# Patient Record
Sex: MALE | Race: WHITE | NOT HISPANIC OR LATINO
[De-identification: names, ages, dates, MRNs, and addresses within clinical notes are randomized per-mention and may not be internally consistent; named-entity substitution may affect disease eponyms.]

---

## 2017-04-18 ENCOUNTER — APPOINTMENT (OUTPATIENT)
Dept: VASCULAR SURGERY | Facility: CLINIC | Age: 77
End: 2017-04-18

## 2017-04-18 VITALS — SYSTOLIC BLOOD PRESSURE: 155 MMHG | DIASTOLIC BLOOD PRESSURE: 75 MMHG | OXYGEN SATURATION: 96 % | HEART RATE: 51 BPM

## 2017-07-14 ENCOUNTER — APPOINTMENT (OUTPATIENT)
Dept: VASCULAR SURGERY | Facility: CLINIC | Age: 77
End: 2017-07-14

## 2018-07-20 ENCOUNTER — OUTPATIENT (OUTPATIENT)
Dept: OUTPATIENT SERVICES | Facility: HOSPITAL | Age: 78
LOS: 1 days | End: 2018-07-20
Payer: MEDICARE

## 2018-07-20 DIAGNOSIS — Z86.79 PERSONAL HISTORY OF OTHER DISEASES OF THE CIRCULATORY SYSTEM: Chronic | ICD-10-CM

## 2018-07-20 DIAGNOSIS — Z98.89 OTHER SPECIFIED POSTPROCEDURAL STATES: Chronic | ICD-10-CM

## 2018-07-20 DIAGNOSIS — Z90.89 ACQUIRED ABSENCE OF OTHER ORGANS: Chronic | ICD-10-CM

## 2018-07-20 PROCEDURE — 73630 X-RAY EXAM OF FOOT: CPT | Mod: 26,RT

## 2018-07-20 PROCEDURE — 73630 X-RAY EXAM OF FOOT: CPT

## 2024-04-22 ENCOUNTER — TRANSCRIPTION ENCOUNTER (OUTPATIENT)
Age: 84
End: 2024-04-22

## 2024-04-26 ENCOUNTER — RESULT REVIEW (OUTPATIENT)
Age: 84
End: 2024-04-26

## 2024-04-26 ENCOUNTER — OUTPATIENT (OUTPATIENT)
Dept: OUTPATIENT SERVICES | Facility: HOSPITAL | Age: 84
LOS: 1 days | End: 2024-04-26
Payer: MEDICARE

## 2024-04-26 ENCOUNTER — APPOINTMENT (OUTPATIENT)
Dept: ORTHOPEDIC SURGERY | Facility: CLINIC | Age: 84
End: 2024-04-26
Payer: MEDICARE

## 2024-04-26 VITALS
SYSTOLIC BLOOD PRESSURE: 142 MMHG | OXYGEN SATURATION: 100 % | HEIGHT: 68 IN | BODY MASS INDEX: 21.22 KG/M2 | DIASTOLIC BLOOD PRESSURE: 83 MMHG | HEART RATE: 60 BPM | WEIGHT: 140 LBS

## 2024-04-26 DIAGNOSIS — Z87.39 PERSONAL HISTORY OF OTHER DISEASES OF THE MUSCULOSKELETAL SYSTEM AND CONNECTIVE TISSUE: ICD-10-CM

## 2024-04-26 DIAGNOSIS — Z98.89 OTHER SPECIFIED POSTPROCEDURAL STATES: Chronic | ICD-10-CM

## 2024-04-26 DIAGNOSIS — Z85.46 PERSONAL HISTORY OF MALIGNANT NEOPLASM OF PROSTATE: ICD-10-CM

## 2024-04-26 DIAGNOSIS — Z86.39 PERSONAL HISTORY OF OTHER ENDOCRINE, NUTRITIONAL AND METABOLIC DISEASE: ICD-10-CM

## 2024-04-26 DIAGNOSIS — Z90.89 ACQUIRED ABSENCE OF OTHER ORGANS: Chronic | ICD-10-CM

## 2024-04-26 DIAGNOSIS — M16.0 BILATERAL PRIMARY OSTEOARTHRITIS OF HIP: ICD-10-CM

## 2024-04-26 DIAGNOSIS — Z86.79 PERSONAL HISTORY OF OTHER DISEASES OF THE CIRCULATORY SYSTEM: ICD-10-CM

## 2024-04-26 DIAGNOSIS — Z60.2 PROBLEMS RELATED TO LIVING ALONE: ICD-10-CM

## 2024-04-26 DIAGNOSIS — Z56.0 UNEMPLOYMENT, UNSPECIFIED: ICD-10-CM

## 2024-04-26 DIAGNOSIS — Z78.9 OTHER SPECIFIED HEALTH STATUS: ICD-10-CM

## 2024-04-26 DIAGNOSIS — Z86.79 PERSONAL HISTORY OF OTHER DISEASES OF THE CIRCULATORY SYSTEM: Chronic | ICD-10-CM

## 2024-04-26 PROCEDURE — 73502 X-RAY EXAM HIP UNI 2-3 VIEWS: CPT

## 2024-04-26 PROCEDURE — 99204 OFFICE O/P NEW MOD 45 MIN: CPT

## 2024-04-26 PROCEDURE — 73502 X-RAY EXAM HIP UNI 2-3 VIEWS: CPT | Mod: 26,LT

## 2024-04-26 RX ORDER — DABIGATRAN ETEXILATE 150 MG/1
CAPSULE ORAL
Refills: 0 | Status: ACTIVE | COMMUNITY

## 2024-04-26 RX ORDER — MELOXICAM 15 MG/1
TABLET ORAL
Refills: 0 | Status: ACTIVE | COMMUNITY

## 2024-04-26 RX ORDER — METFORMIN HYDROCHLORIDE 625 MG/1
TABLET ORAL
Refills: 0 | Status: ACTIVE | COMMUNITY

## 2024-04-26 RX ORDER — RELUGOLIX 120 MG/1
TABLET, FILM COATED ORAL
Refills: 0 | Status: ACTIVE | COMMUNITY

## 2024-04-26 RX ORDER — AMLODIPINE BESYLATE 5 MG/1
TABLET ORAL
Refills: 0 | Status: ACTIVE | COMMUNITY

## 2024-04-26 RX ORDER — ASPIRIN 81 MG
81 TABLET,CHEWABLE ORAL
Refills: 0 | Status: ACTIVE | COMMUNITY

## 2024-04-26 SDOH — SOCIAL STABILITY - SOCIAL INSECURITY: PROBLEMS RELATED TO LIVING ALONE: Z60.2

## 2024-04-26 SDOH — ECONOMIC STABILITY - INCOME SECURITY: UNEMPLOYMENT, UNSPECIFIED: Z56.0

## 2024-04-26 NOTE — REVIEW OF SYSTEMS
[Arthralgia] : arthralgia [Joint Pain] : joint pain [Joint Stiffness] : joint stiffness [Fever] : no fever [Chills] : no chills

## 2024-04-26 NOTE — HISTORY OF PRESENT ILLNESS
[de-identified] : 83-year-old male for evaluation of left hip pain. Patient endorses a history of arthritis, and has been having pain in the left hip for over 15 years, denies any injury. Patient mainly endorses lateral hip pain, with occasional buttock pain. He feels the pain has progressed over time. He has pain with walking and standing and transitions, occasional night pain, but is able to sleep in the left side. He also endorses some clicking. He is unable to do shoes and socks without assistance. for the last few years, patient has been in a wheelchair when going outside of the home, but also has a walker, and uses a four-legged cane and his other arm on furniture for support around the home. He has had multiple hip injections in the past, which have become less effective overtime, the last was one year ago.  He also feels that his inability to ambulate have significantly decreased in the last year. He also describes history of significant arthritis in the left knee, and difficulty straightening this knee. Past medical history of a CVA in 2014 with some visual deficits, diabetes, hypertension, hyperlipidemia, prostate cancer, spinal stenosis. Past surgical history of hernia repair. He sees a PCP, cardiologist, urologist, and denies any drug allergies. He does live alone in the family home, has neighbors for support.

## 2024-04-26 NOTE — PHYSICAL EXAM
[Antalgic] : antalgic [Stooped] : stooped [Cane] : ambulates with cane [Wheelchair] : uses a wheelchair [Normal] : Alert and in no acute distress [de-identified] : Patient is alert and oriented x 3.  He had a great deal of difficulty even taking a few steps in the office on gait exam but was able to rise up with the help of arm support from the seated position to stand.    Left hip: flexion to 100, internal rotation 20, external rotation 30, tender palpation over the greater trochanter, painful at extremes of motion, which was reproduced to lateral hip pain, Abduction strength 4/5 Left knee: 10 valgus,  range of motion, Crepitus throughout arc of motion, Anterior and lateral joint line pain [de-identified] : Radiographs today of the left hip to include 4 views. There is severe arthritic changes noted with Bonan bone sclerosis, joint space narrowing, osteophytic changes, and sub. The right hip is also demonstrating significant arthritis, on the left side there is protrusion noted. There is also, significant arthritic changes and sclerosis of the lumbar spine noted.

## 2024-04-26 NOTE — DISCUSSION/SUMMARY
[de-identified] : I was present with the Fellow during the key portions of the history and exam. I discussed the case with the Fellow and agree with the findings and plan as documented in the [facility] note, unless otherwise noted below.    83-year-old male with left hip pain, secondary to trochanteric bursitis and osteoarthritis. Difficulty to ambulate likely secondary to spinal stenosis, possible contribution of hip arthritis, trochanteric bursitis, and knee arthritis with knee flexion contracture.  Given the clinical symptoms, physical exam and imaging findings, we discussed the possibility of hip replacement surgery.  We reviewed the elective quality of life nature of the procedure.  We also discussed the risks, benefits and expected and potential outcomes at length.  The details of those risks are below.  Although I think the hips are certainly contributing to his pain his overall ambulation is affected by many issues.  Is not clear to me that the risk-benefit ratio would favor arthroplasty at least at this time.  We had a long discussion with the patient that he has many causes which may be leading to his ambulating, which seems to have been progressing over a long period of time. The patient has not had evaluation on his spine, and we recommended someone for referral for him to get checked out. We also discussed the nature of this elective procedure and that it does carry risk. In the interim, we will start with physical therapy to help with gait training, ambulation, and balance. The patient said that this would be a great place to start as he is trying to be able to walk short distances across the room. All questions or answered to his satisfaction.

## 2024-05-03 ENCOUNTER — APPOINTMENT (OUTPATIENT)
Dept: ORTHOPEDIC SURGERY | Facility: CLINIC | Age: 84
End: 2024-05-03
Payer: MEDICARE

## 2024-05-03 VITALS
SYSTOLIC BLOOD PRESSURE: 173 MMHG | HEART RATE: 68 BPM | HEIGHT: 68 IN | TEMPERATURE: 97 F | BODY MASS INDEX: 21.22 KG/M2 | WEIGHT: 140 LBS | DIASTOLIC BLOOD PRESSURE: 85 MMHG

## 2024-05-03 DIAGNOSIS — M16.10 UNILATERAL PRIMARY OSTEOARTHRITIS, UNSPECIFIED HIP: ICD-10-CM

## 2024-05-03 DIAGNOSIS — M47.816 SPONDYLOSIS W/OUT MYELOPATHY OR RADICULOPATHY, LUMBAR REGION: ICD-10-CM

## 2024-05-03 DIAGNOSIS — M41.9 SCOLIOSIS, UNSPECIFIED: ICD-10-CM

## 2024-05-03 PROCEDURE — 72110 X-RAY EXAM L-2 SPINE 4/>VWS: CPT

## 2024-05-03 PROCEDURE — 99215 OFFICE O/P EST HI 40 MIN: CPT

## 2024-05-03 PROCEDURE — 99205 OFFICE O/P NEW HI 60 MIN: CPT

## 2024-05-03 NOTE — PHYSICAL EXAM
[UE/LE] : Sensory: Intact in bilateral upper & lower extremities [Normal DTR Reflexes] : DTR reflexes normal [Normal Proprioception] : sensation intact for proprioception [Normal] : No costovertebral angle tenderness and no spinal tenderness [de-identified] : Range of motion left hip aggravates posterior pelvic pain Tenderness left greater trochanter [de-identified] : 4 view lumbar spine x-ray Ortho PACS 5/3/2024: End-stage osteoarthritis bilateral hips.  Degenerative lumbar scoliosis with coronal shift of the thoracolumbar junction to the right.  Asymmetric disc base collapse at L3-4 and L4-5.  No evidence of instability.

## 2024-05-03 NOTE — DISCUSSION/SUMMARY
[de-identified] : Leonardo discussion with the patient regarding my impression of his symptoms and the available imaging.  I do feel that the character of his pain exam today is consistent with a symptomatic left osteoarthritic hip however the posterior nature of the pain is somewhat atypical.  Given the lumbar degenerative change and scoliosis as well as history of injections I recommended an MRI of the lumbar spine to evaluate for any obvious stenosis or significant degenerative change that may be contributing to his pain in this area.  I also feel that he likely has some symptomatic greater trochanteric bursitis and we discussed the potential utility of an injection at this area for symptomatic relief.  In the interim I have encouraged him to continue with the exercise protocol.  Will review the imaging as soon as it is available  I have spent greater than 60 minutes preparing to see the patient, collecting relevant history, performing a thorough history and physical examination, counseling the patient regarding my findings ordering the appropriate therapies and tests, communicating with other relevant healthcare professionals, documenting my encounter and coordinating care.

## 2024-05-03 NOTE — HISTORY OF PRESENT ILLNESS
[de-identified] : 83-year-old male presents as new patient for evaluation of left posterior hip pain.  He states is been present for approximately 10 to 15 years and has gradually limited his ability to ambulate.  The pain is described as sharp and aching radiates from the posterior buttock into the left greater trochanter occasionally to the groin.  Feels the need to lean to the right side and use a cane for ambulation typically's wheelchair when leaving the house.  Denies any radiating pain numbness tingling or weakness fatigue distally in the legs when walking.  This pain is mechanical and reliably improves when sitting down or lying.  He describes a remote history of injections into the back which were helpful however most recently were not several years ago.  He has been evaluated by Dr. Echeverria for the possibility of a left total hip replacement in the setting of bilateral severe osteoarthritis.  Several days ago he initiated an exercise program which she feels has been helping somewhat already

## 2024-05-03 NOTE — ASSESSMENT
[FreeTextEntry1] : 83-year-old male with chronic left-sided posterior pelvic and buttock pain likely secondary to osteoarthritis versus lumbar radiculopathy or facet mediated referred symptoms, with likely component of greater trochanteric bursitis

## 2024-05-07 ENCOUNTER — APPOINTMENT (OUTPATIENT)
Dept: MRI IMAGING | Facility: CLINIC | Age: 84
End: 2024-05-07
Payer: MEDICARE

## 2024-05-07 PROCEDURE — 72148 MRI LUMBAR SPINE W/O DYE: CPT

## 2024-07-12 ENCOUNTER — APPOINTMENT (OUTPATIENT)
Dept: CARDIOLOGY | Facility: CLINIC | Age: 84
End: 2024-07-12
Payer: MEDICARE

## 2024-07-12 ENCOUNTER — NON-APPOINTMENT (OUTPATIENT)
Age: 84
End: 2024-07-12

## 2024-07-12 VITALS — SYSTOLIC BLOOD PRESSURE: 115 MMHG | RESPIRATION RATE: 17 BRPM | DIASTOLIC BLOOD PRESSURE: 79 MMHG | HEIGHT: 68 IN

## 2024-07-12 DIAGNOSIS — I10 ESSENTIAL (PRIMARY) HYPERTENSION: ICD-10-CM

## 2024-07-12 DIAGNOSIS — I48.91 UNSPECIFIED ATRIAL FIBRILLATION: ICD-10-CM

## 2024-07-12 DIAGNOSIS — E78.5 HYPERLIPIDEMIA, UNSPECIFIED: ICD-10-CM

## 2024-07-12 DIAGNOSIS — Z86.73 PERSONAL HISTORY OF TRANSIENT ISCHEMIC ATTACK (TIA), AND CEREBRAL INFARCTION W/OUT RESIDUAL DEFICITS: ICD-10-CM

## 2024-07-12 DIAGNOSIS — E11.9 TYPE 2 DIABETES MELLITUS W/OUT COMPLICATIONS: ICD-10-CM

## 2024-07-12 DIAGNOSIS — R01.1 CARDIAC MURMUR, UNSPECIFIED: ICD-10-CM

## 2024-07-12 DIAGNOSIS — D50.9 IRON DEFICIENCY ANEMIA, UNSPECIFIED: ICD-10-CM

## 2024-07-12 DIAGNOSIS — I35.0 NONRHEUMATIC AORTIC (VALVE) STENOSIS: ICD-10-CM

## 2024-07-12 DIAGNOSIS — Z82.49 FAMILY HISTORY OF ISCHEMIC HEART DISEASE AND OTHER DISEASES OF THE CIRCULATORY SYSTEM: ICD-10-CM

## 2024-07-12 PROCEDURE — 93005 ELECTROCARDIOGRAM TRACING: CPT

## 2024-07-12 PROCEDURE — G2211 COMPLEX E/M VISIT ADD ON: CPT

## 2024-07-12 PROCEDURE — 93010 ELECTROCARDIOGRAM REPORT: CPT

## 2024-07-12 PROCEDURE — 93000 ELECTROCARDIOGRAM COMPLETE: CPT

## 2024-07-12 PROCEDURE — 99205 OFFICE O/P NEW HI 60 MIN: CPT

## 2024-07-12 RX ORDER — FUROSEMIDE 20 MG/1
20 TABLET ORAL
Refills: 0 | Status: ACTIVE | COMMUNITY

## 2024-07-12 RX ORDER — ASPIRIN 81 MG/1
81 TABLET ORAL
Refills: 0 | Status: ACTIVE | COMMUNITY

## 2024-07-12 RX ORDER — METOPROLOL SUCCINATE 25 MG/1
25 TABLET, EXTENDED RELEASE ORAL
Refills: 0 | Status: ACTIVE | COMMUNITY

## 2024-07-12 RX ORDER — LOSARTAN POTASSIUM 25 MG/1
25 TABLET, FILM COATED ORAL
Refills: 0 | Status: ACTIVE | COMMUNITY

## 2024-07-16 PROBLEM — I35.0 AORTIC STENOSIS: Status: ACTIVE | Noted: 2024-07-16

## 2024-07-16 RX ORDER — METFORMIN HYDROCHLORIDE 500 MG/1
500 TABLET, COATED ORAL TWICE DAILY
Refills: 0 | Status: ACTIVE | COMMUNITY

## 2024-07-16 RX ORDER — MONTELUKAST SODIUM 10 MG/1
10 TABLET, FILM COATED ORAL
Refills: 0 | Status: ACTIVE | COMMUNITY

## 2024-07-16 RX ORDER — CHLORHEXIDINE GLUCONATE 4 %
325 (65 FE) LIQUID (ML) TOPICAL
Refills: 0 | Status: ACTIVE | COMMUNITY

## 2024-07-16 RX ORDER — IBUPROFEN 200 MG/1
200 TABLET ORAL
Refills: 0 | Status: ACTIVE | COMMUNITY

## 2024-07-22 ENCOUNTER — APPOINTMENT (OUTPATIENT)
Dept: ORTHOPEDIC SURGERY | Facility: CLINIC | Age: 84
End: 2024-07-22

## 2024-08-09 ENCOUNTER — APPOINTMENT (OUTPATIENT)
Dept: ORTHOPEDIC SURGERY | Facility: CLINIC | Age: 84
End: 2024-08-09

## 2024-08-09 PROCEDURE — 99213 OFFICE O/P EST LOW 20 MIN: CPT

## 2024-08-09 NOTE — HISTORY OF PRESENT ILLNESS
[de-identified] : CHERI GOOD is known to me for bilateral hip osteoarthritis, L>R. Since we last saw them, he was dx with a UTI and was hospitalized for this and ultimately d/c'd to a rehab which he's been in for about a month. He did see Dr. Aldridge for his spine and had an MRI which he hasn't had a chance to review with Dr. Aldridge yet. He also saw a [facility] cardiologist while in the rehab and has been scheduled for a TTE on 9/3/24. He notes he's actually been able to put more weight on the left hip/leg after working with PT in the rehab and is able to ambulate with a walker at times. Denies any fevers and chills or other signs of infection.

## 2024-08-09 NOTE — DISCUSSION/SUMMARY
[de-identified] : I was present with the Fellow during the key portions of the history and exam. I discussed the case with the Fellow and agree with the findings and plan as documented in the [facility] note, unless otherwise noted below.  Bilateral hip arthritis more significant on the left.  He did have a spine evaluation as we suggested and had an MRI.  I asked him to review that and we will as well but I do feel that the hip is probably a significant contributor and likely the major contributor to his symptoms.  Unfortunate he is currently in rehab having had a UTI.  He is working on reconditioning and has started using a walker again.  We had a very long discussion with the patient and the care manager who came to visit with him today regarding this.  He is very anxious to have the hip done but I think it is important to have appropriate expectations.  I think the hip if successful could relieve pain but will not address all of his conditioning and ambulation issues and I think he would likely still need a walker long-term.  We discussed this at length.  In addition I do want him to be conditioned for the surgery in order to participate in needed therapy afterwards.  He also needs a cardiology workup with an echocardiogram.  I think the best plan is for him to continue forward with the cardiac evaluation and continue working at the rehab so that he can be discharged home safely to return when that is accomplished so that we can ensure he is conditioned enough to tolerate a surgery if he is medically cleared to do so.  Patient expressed understanding.  All questions answered.

## 2024-08-09 NOTE — PHYSICAL EXAM
[de-identified] : General: AxO x 3   In wheelchair today not able to ambulate without walker and assistance Neuro: 5/5 strength with foot eversion, foot inversion, dorsiflexion, plantar flexion, sensation is intact over the lower extremity in L2-S1 nerve distributions. 2+ dorsalis pedis and posterior tibial pulses.    Skin: no signs of infection  Hip: ROM: 90  [de-identified] : Previous x-rays of the hips are again reviewed.  He has severe degenerative arthritis of both hips with a significant valgus deformity and superior pelvic bone loss on the left.  There is significant degenerative disease of the spine noted as well.

## 2024-08-19 ENCOUNTER — APPOINTMENT (OUTPATIENT)
Dept: ORTHOPEDIC SURGERY | Facility: CLINIC | Age: 84
End: 2024-08-19
Payer: MEDICARE

## 2024-08-19 VITALS — WEIGHT: 140 LBS | BODY MASS INDEX: 21.22 KG/M2 | TEMPERATURE: 97.5 F | HEIGHT: 68 IN

## 2024-08-19 DIAGNOSIS — M16.10 UNILATERAL PRIMARY OSTEOARTHRITIS, UNSPECIFIED HIP: ICD-10-CM

## 2024-08-19 DIAGNOSIS — M47.816 SPONDYLOSIS W/OUT MYELOPATHY OR RADICULOPATHY, LUMBAR REGION: ICD-10-CM

## 2024-08-19 PROCEDURE — 99215 OFFICE O/P EST HI 40 MIN: CPT

## 2024-08-19 NOTE — ASSESSMENT
[FreeTextEntry1] : 83-year-old male with chronic left-sided posterior pelvic and buttock pain likely secondary to hip osteoarthritis and greater trochanteric bursitis

## 2024-08-19 NOTE — DISCUSSION/SUMMARY
[de-identified] : I reviewed the imaging with the patient today as well as my impression that the preponderance of his symptoms are likely related to the severe osteoarthritis in the left hip.  I have not presently recommended any intervention for the lumbar spine nor have I placed any restriction with regards to his activity level or positioning in the setting of a potential surgery.  He will follow-up with me on as-needed basis   I have spent greater than 45 minutes preparing to see the patient, collecting relevant history, performing a thorough history and physical examination, counseling the patient regarding my findings ordering the appropriate therapies and tests, communicating with other relevant healthcare professionals, documenting my encounter and coordinating care.

## 2024-08-19 NOTE — HISTORY OF PRESENT ILLNESS
[de-identified] : Returns today to review lumbar spine MRI.  Has followed up with Dr. Persaud for consideration of left total hip arthroplasty.  Currently at rehabilitation center with plans to return home and is coming weeks.  Denies any considerable change in symptoms since her last visit.  He is able to stand however walking is extremely difficult citing posterior pelvic and lateral hip pain and stiffness.

## 2024-08-19 NOTE — PHYSICAL EXAM
[Wheelchair] : uses a wheelchair [UE/LE] : Sensory: Intact in bilateral upper & lower extremities [Normal DTR Reflexes] : DTR reflexes normal [Normal Proprioception] : sensation intact for proprioception [Normal] : No costovertebral angle tenderness and no spinal tenderness [de-identified] : Range of motion left hip aggravates posterior pelvic pain Tenderness left greater trochanter [de-identified] : MRI lumbar spine care strength 5/7/2024 Degenerative disc disease at multiple levels most notable at L3-4 and L4-5.  Mild foraminal stenosis on the right at L4-5 and L5-S1 as well as on the left at L5-S1.  Minimal facet arthritis at multiple levels.  Overall alignment maintained

## 2024-08-27 ENCOUNTER — APPOINTMENT (OUTPATIENT)
Dept: CARDIOLOGY | Facility: CLINIC | Age: 84
End: 2024-08-27

## 2024-08-30 ENCOUNTER — APPOINTMENT (OUTPATIENT)
Dept: CARDIOLOGY | Facility: CLINIC | Age: 84
End: 2024-08-30
Payer: MEDICARE

## 2024-08-30 VITALS
DIASTOLIC BLOOD PRESSURE: 80 MMHG | BODY MASS INDEX: 21.22 KG/M2 | HEIGHT: 68 IN | OXYGEN SATURATION: 99 % | HEART RATE: 76 BPM | SYSTOLIC BLOOD PRESSURE: 143 MMHG | WEIGHT: 140 LBS

## 2024-08-30 DIAGNOSIS — I35.0 NONRHEUMATIC AORTIC (VALVE) STENOSIS: ICD-10-CM

## 2024-08-30 DIAGNOSIS — I42.9 CARDIOMYOPATHY, UNSPECIFIED: ICD-10-CM

## 2024-08-30 DIAGNOSIS — Z01.810 ENCOUNTER FOR PREPROCEDURAL CARDIOVASCULAR EXAMINATION: ICD-10-CM

## 2024-08-30 DIAGNOSIS — I48.91 UNSPECIFIED ATRIAL FIBRILLATION: ICD-10-CM

## 2024-08-30 PROCEDURE — 99214 OFFICE O/P EST MOD 30 MIN: CPT

## 2024-08-30 PROCEDURE — 93306 TTE W/DOPPLER COMPLETE: CPT | Mod: TC

## 2024-08-30 PROCEDURE — G2211 COMPLEX E/M VISIT ADD ON: CPT

## 2024-08-30 PROCEDURE — 93306 TTE W/DOPPLER COMPLETE: CPT | Mod: 26,59

## 2024-08-30 RX ORDER — DABIGATRAN ETEXILATE MESYLATE 150 MG/1
150 CAPSULE ORAL
Refills: 0 | Status: ACTIVE | COMMUNITY

## 2024-09-02 NOTE — CARDIOLOGY SUMMARY
[de-identified] : 7/16/2024 Atrial fibrillation. T wave inversions consider anterior ischemia [de-identified] : TTE (outside facility, report only) 6/24/24 CV Echo 2D Complete Result Date: 6/24/2024 Indications: Atrial Fibrillation, unspecified (I48.91). CPT Codes: 2D ECHO (TTE) - 74461 STRAIN - 85865 History: PMH: - Arthritis - Atrial fibrillation (HCC) - Diabetes mellitus (HCC) - Hyperlipidemia - Hypertension - Stroke (HCC) 3/15/14 Study data: Transthoracic echocardiogram. Complete 2D, complete spectral Doppler, and color Doppler techniques were used. Location: Bedside. Patient status: Inpatient. Study status: Routine. ---------------------------------------------------------------------------- Procedure: Transthoracic echocardiography was performed. Image quality for TTE was adequate. ---------------------------------------------------------------------------- Conclusions. Summary: 1. Left ventricle: The cavity size is normal. Diffuse hypocontractility of the left ventricle is present with minor regional variation. Akinesis of the anteroseptal myocardium Akinesis of the apicalanterior myocardium Akinesis of the midinferior and inferoseptal myocardium Hypokinesis of the basalinferior and inferoseptal myocardium The left ventricular ejection fraction is moderately-severely reduced. The LVEF was determined by visual estimate. Left ventricular ejection fraction is 30-35%. The global longitudinal strain is 8.5%. 2. Right ventricle: The cavity size is normal. Right ventricular systolic function is normal. 3. Mitral valve: The leaflets are normal thickness. There is mild (1+) mitral valve regurgitation. 4. Aortic valve: The valve is trileaflet. The leaflets are moderately calcified. There is severe aortic stenosis. There is mild to moderate (1-2+) valvular aortic regurgitation. 5. Tricuspid valve: The tricuspid leaflets are not thickened. There is mild (1+) tricuspid valve regurgitation. 6. Pericardium, extracardiac: There is no pericardial effusion. ---------------------------------------------------------------------------- Findings Left ventricle: The cavity size is normal. The left ventricular ejection fraction is moderately-severely reduced. The LVEF was determined by visual estimate. Left ventricular ejection fraction is 30-35%. Mild proximal septal thickening. Regional wall motion assessment: Diffuse hypocontractility of the left ventricle is present with minor regional variation. Left atrium: The cavity size is normal in size. Right ventricle: The cavity size is normal. Right ventricular systolic function is normal. Can not evaluate right ventricular systolic pressure and pulmonary pressure due to technically difficult/limited images. Right atrium: The cavity size is normal in size. Aortic valve: The valve is trileaflet. The leaflets are moderately calcified. There is severe aortic stenosis. There is mild to moderate (1-2+) valvular aortic regurgitation. Mitral valve: The leaflets are normal thickness. There is mild (1+) mitral valve regurgitation. Tricuspid valve: The tricuspid leaflets are not thickened. There is mild (1+) tricuspid valve regurgitation. Pulmonic valve: There is trace pulmonic regurgitation. Aorta: The aortic root size is normal. The ascending aorta size was not well visualized. Systemic veins: The inferior vena cava was not well visualized. Pericardium: There is no pericardial effusion.

## 2024-09-02 NOTE — RESULTS/DATA
[TextEntry] : 7/1/24 tchol 125 hdl 34 trig 86 ldl 75  iron 15 %sat 5  Na138 K 4.9 cr 0.79 normal lfts  a1c 6.6%  WBC 8.06 hgb 8.4 hct 28.1% plt 450 mcv 76.8  tsh 1.57

## 2024-09-02 NOTE — HISTORY OF PRESENT ILLNESS
[FreeTextEntry1] : CARDIOLOGY INITIAL VISIT  Dear  Does not have PCP currently  I had the pleasure of seeing Mr. CHERI GOOD in cardiology clinic today for cardiomegaly reportedly seen on a CXR and aortic stenosis  The patient is a poor historian.    HPI As you are aware, CHERI GOOD is a 83 year male with a history of osteoarthritis, hypertension, CVA in 2014, CAD(?) s/p PCI(?), prostate cancer(?), atrial fibrillation on chronic anticoagulation, PAD s/p intervention? and diabetes. He had a recent admission from approximately June 21 to 28 2024, where he presented to [facility] with altered mental status on 6/21, poor PO intake and foul smelling urine along with fevers with symptoms suspected to be due to a UTI. During his admission he had a TTE which reported an LVEF of 30-35% reported severe aortic stenosis. He was discharged to a skilled nursing facility.   3-4 months ago prior to this first appointment here on 7/12/24, the patient reports he was ambulating with a cane. 1-2 months later he had to move to a walker. As above, he was placed in a SNF after this admission due to worsening functional status. He reports his worsening functional status is due to hip pain and he is being considered for hip replacement. Denies chest pain. Denies LE edema. Denies palpitations. Denies lightheadedness. Denies bleeding episodes. No reports of orthopnea or PND. His primary complaint is pain around his hip

## 2024-09-02 NOTE — CARDIOLOGY SUMMARY
[de-identified] : 7/16/2024 Atrial fibrillation. T wave inversions consider anterior ischemia [de-identified] : TTE (outside facility, report only) 6/24/24 CV Echo 2D Complete Result Date: 6/24/2024 Indications: Atrial Fibrillation, unspecified (I48.91). CPT Codes: 2D ECHO (TTE) - 85451 STRAIN - 13822 History: PMH: - Arthritis - Atrial fibrillation (HCC) - Diabetes mellitus (HCC) - Hyperlipidemia - Hypertension - Stroke (HCC) 3/15/14 Study data: Transthoracic echocardiogram. Complete 2D, complete spectral Doppler, and color Doppler techniques were used. Location: Bedside. Patient status: Inpatient. Study status: Routine. ---------------------------------------------------------------------------- Procedure: Transthoracic echocardiography was performed. Image quality for TTE was adequate. ---------------------------------------------------------------------------- Conclusions. Summary: 1. Left ventricle: The cavity size is normal. Diffuse hypocontractility of the left ventricle is present with minor regional variation. Akinesis of the anteroseptal myocardium Akinesis of the apicalanterior myocardium Akinesis of the midinferior and inferoseptal myocardium Hypokinesis of the basalinferior and inferoseptal myocardium The left ventricular ejection fraction is moderately-severely reduced. The LVEF was determined by visual estimate. Left ventricular ejection fraction is 30-35%. The global longitudinal strain is 8.5%. 2. Right ventricle: The cavity size is normal. Right ventricular systolic function is normal. 3. Mitral valve: The leaflets are normal thickness. There is mild (1+) mitral valve regurgitation. 4. Aortic valve: The valve is trileaflet. The leaflets are moderately calcified. There is severe aortic stenosis. There is mild to moderate (1-2+) valvular aortic regurgitation. 5. Tricuspid valve: The tricuspid leaflets are not thickened. There is mild (1+) tricuspid valve regurgitation. 6. Pericardium, extracardiac: There is no pericardial effusion. ---------------------------------------------------------------------------- Findings Left ventricle: The cavity size is normal. The left ventricular ejection fraction is moderately-severely reduced. The LVEF was determined by visual estimate. Left ventricular ejection fraction is 30-35%. Mild proximal septal thickening. Regional wall motion assessment: Diffuse hypocontractility of the left ventricle is present with minor regional variation. Left atrium: The cavity size is normal in size. Right ventricle: The cavity size is normal. Right ventricular systolic function is normal. Can not evaluate right ventricular systolic pressure and pulmonary pressure due to technically difficult/limited images. Right atrium: The cavity size is normal in size. Aortic valve: The valve is trileaflet. The leaflets are moderately calcified. There is severe aortic stenosis. There is mild to moderate (1-2+) valvular aortic regurgitation. Mitral valve: The leaflets are normal thickness. There is mild (1+) mitral valve regurgitation. Tricuspid valve: The tricuspid leaflets are not thickened. There is mild (1+) tricuspid valve regurgitation. Pulmonic valve: There is trace pulmonic regurgitation. Aorta: The aortic root size is normal. The ascending aorta size was not well visualized. Systemic veins: The inferior vena cava was not well visualized. Pericardium: There is no pericardial effusion.

## 2024-09-02 NOTE — ASSESSMENT
[FreeTextEntry1] : ####  The patient is a poor historian. He reports previous stenting in the past, possibly coronary and reports taking an ASA at home prior to his hospitalization. Apparently, his PCP passed away and he does not have one. He has a cardiologist named Dr. De Santiago and today was able to provide his full name to us so we were able to look it up. We will request records so we can hopefully piece together his cardiovascular history. His TTE today was technically difficult but showed moderate aortic stenosis and grossly mildly reduced LV systolic dysfunction. This may not be new but the records will be required. He is here today with a friend? Per the admission notes available to me, the patient has a healthcare proxy named Kaleb Ryan ([phone])  As his functional status is poor and given the TTE findings, will obtain vasodilator nuclear stress test to aid in risk stratification for his orthopedic surgery

## 2024-09-02 NOTE — ASSESSMENT
[FreeTextEntry1] : ####  The patient is a poor historian. He reports previous stenting in the past, possibly coronary and reports taking an ASA at home prior to his hospitalization. Apparently, his PCP passed away and he does not have one. He has a cardiologist named Dr. De Santiago and today was able to provide his full name to us so we were able to look it up. We will request records so we can hopefully piece together his cardiovascular history. His TTE today was technically difficult but showed moderate aortic stenosis and grossly mildly reduced LV systolic dysfunction. This may not be new but the records will be required. He is here today with a friend? Per the admission notes available to me, the patient has a healthcare proxy named Kaleb Ryan ([phone])  As his functional status is poor and given the TTE findings, will obtain vasodilator nuclear stress test to aid in risk stratification for his orthopedic surgery

## 2024-09-02 NOTE — HISTORY OF PRESENT ILLNESS
[FreeTextEntry1] : CARDIOLOGY INITIAL VISIT  Dear  Does not have PCP currently  I had the pleasure of seeing Mr. CHERI GOOD in cardiology clinic today for cardiomegaly reportedly seen on a CXR and aortic stenosis  The patient is a poor historian.    HPI As you are aware, CHERI GOOD is a 83 year male with a history of osteoarthritis, hypertension, CVA in 2014, CAD(?) s/p PCI(?), prostate cancer(?), atrial fibrillation on chronic anticoagulation, PAD s/p intervention? and diabetes. He had a recent admission from approximately June 21 to 28 2024, where he presented to [facility] with altered mental status on 6/21, poor PO intake and foul smelling urine along with fevers with symptoms suspected to be due to a UTI. During his admission he had a TTE which reported an LVEF of 30-35% reported severe aortic stenosis. He was discharged to a skilled nursing facility.   3-4 months ago prior to this first appointment here on 7/12/24, the patient reports he was ambulating with a cane. 1-2 months later he had to move to a walker. As above, he was placed in a SNF after this admission due to worsening functional status. He reports his worsening functional status is due to hip pain and he is being considered for hip replacement. Denies chest pain. Denies LE edema. Denies palpitations. Denies lightheadedness. Denies bleeding episodes. No reports of orthopnea or PND. His primary complaint is pain around his hip

## 2024-09-02 NOTE — PHYSICAL EXAM
[TextEntry] : Gen: NAD, breathing comfortably, sitting in wheelchair HEENT: MMM, anicteric sclera Neck: JVP <10 cm. possible radiation of murmur to carotids Cardiac: RRR, 3/6 systolic murmur heard loudest at the RUSB Lungs: CTAB, adequate inspiratory effort. No wheezes or rhonchi Abd: soft, NT/ND. No abd bruit Ext: warm and well perfused. No significant LE edema

## 2024-09-02 NOTE — END OF VISIT
[Time Spent: ___ minutes] : I have spent [unfilled] minutes of time on the encounter which excludes teaching and separately reported services. [TextEntry] : I spent 25 minutes in face-to-face contact with the patient and 35 non face time minutes in support of this visit, including the following: Preparing to see the patient (review of tests/outside records) Obtaining and/or reviewing separately obtained history Counseling and educating the patient/family/caregiver Ordering medications; tests; or procedures, Documenting clinical information in the medical record/chart Independently interpreting results (not separately reported) and communicating results to the patient/family/caregiver Care coordination (not separately reported)

## 2024-09-18 ENCOUNTER — APPOINTMENT (OUTPATIENT)
Dept: OTOLARYNGOLOGY | Facility: CLINIC | Age: 84
End: 2024-09-18
Payer: MEDICARE

## 2024-09-18 VITALS — DIASTOLIC BLOOD PRESSURE: 72 MMHG | SYSTOLIC BLOOD PRESSURE: 108 MMHG | HEART RATE: 64 BPM

## 2024-09-18 DIAGNOSIS — Z56.0 UNEMPLOYMENT, UNSPECIFIED: ICD-10-CM

## 2024-09-18 DIAGNOSIS — H61.23 IMPACTED CERUMEN, BILATERAL: ICD-10-CM

## 2024-09-18 DIAGNOSIS — Z85.46 PERSONAL HISTORY OF MALIGNANT NEOPLASM OF PROSTATE: ICD-10-CM

## 2024-09-18 DIAGNOSIS — Z87.39 PERSONAL HISTORY OF OTHER DISEASES OF THE MUSCULOSKELETAL SYSTEM AND CONNECTIVE TISSUE: ICD-10-CM

## 2024-09-18 DIAGNOSIS — Z86.39 PERSONAL HISTORY OF OTHER ENDOCRINE, NUTRITIONAL AND METABOLIC DISEASE: ICD-10-CM

## 2024-09-18 DIAGNOSIS — Z60.2 PROBLEMS RELATED TO LIVING ALONE: ICD-10-CM

## 2024-09-18 DIAGNOSIS — Z78.9 OTHER SPECIFIED HEALTH STATUS: ICD-10-CM

## 2024-09-18 DIAGNOSIS — Z86.79 PERSONAL HISTORY OF OTHER DISEASES OF THE CIRCULATORY SYSTEM: ICD-10-CM

## 2024-09-18 DIAGNOSIS — H90.3 SENSORINEURAL HEARING LOSS, BILATERAL: ICD-10-CM

## 2024-09-18 DIAGNOSIS — E11.9 TYPE 2 DIABETES MELLITUS W/OUT COMPLICATIONS: ICD-10-CM

## 2024-09-18 PROCEDURE — 92567 TYMPANOMETRY: CPT

## 2024-09-18 PROCEDURE — 69210 REMOVE IMPACTED EAR WAX UNI: CPT

## 2024-09-18 PROCEDURE — 99203 OFFICE O/P NEW LOW 30 MIN: CPT | Mod: 25

## 2024-09-18 PROCEDURE — 92557 COMPREHENSIVE HEARING TEST: CPT

## 2024-09-18 SDOH — SOCIAL STABILITY - SOCIAL INSECURITY: PROBLEMS RELATED TO LIVING ALONE: Z60.2

## 2024-09-18 SDOH — ECONOMIC STABILITY - INCOME SECURITY: UNEMPLOYMENT, UNSPECIFIED: Z56.0

## 2024-09-18 NOTE — ASSESSMENT
[FreeTextEntry1] : SNHL and Cerumen: - Cerumen removed - Audiogram today - b/l SNHL - Cleared for HA b/l - HAE bilateral sensorineural hearing loss-cleared for hearing aids - f/u 6 months

## 2024-09-18 NOTE — PHYSICAL EXAM
[Midline] : trachea located in midline position [Normal] : no rashes [de-identified] : b/l dangelon

## 2024-09-18 NOTE — END OF VISIT
[Time Spent: ___ minutes] : I have spent [unfilled] minutes of time on the encounter which excludes teaching and separately reported services. [FreeTextEntry3] : I personally saw and examined CHERI GOOD in detail.I have made changes in changes in the body of the note where appropriate. I personally reviewed the HPI, PMH, FH, SH, ROS and medications/allergies. I have spoken to RUBY Gonzalez regarding the history and have personally determined the assessment and plan of care and documented this myself.. I performed all procedures and performed the physical exam. I have made changes in the body of the note where appropriate.   Attesting Faculty: See Attending Signature Below

## 2024-09-18 NOTE — HISTORY OF PRESENT ILLNESS
[de-identified] : 82 y/o M here with an aid.  Pt. c/o decreased hearing.  No pain, no tinnitus or Vertigo.  No d/c.  He notes he would like to get hearing aids.  No associated ear symptoms. No nasal congestion or throat c/o. no other modifying factors no other nasal or throat complaints

## 2024-09-18 NOTE — DATA REVIEWED
[de-identified] : Hearing Test performed to evaluate the extent of hearing loss and  to explain pt's symptoms  was personally reviewed and revealed Tymps-wnl Hearing-bilat SNHL w/fair SDS

## 2024-09-18 NOTE — PROCEDURE
[FreeTextEntry3] : Procedure - Cerumen Removal. Indication - Obstructing visualization of TM After informed verbal consent is obtained, cerumen was removed from the b/l ear canal(s) with a curette and suction.  Normal appearing canal(s) following removal.

## 2024-09-18 NOTE — REASON FOR VISIT
[Initial Evaluation] : an initial evaluation for [Formal Caregiver] : formal caregiver [FreeTextEntry2] : hearing loss

## 2024-10-03 ENCOUNTER — APPOINTMENT (OUTPATIENT)
Dept: GERIATRICS | Facility: CLINIC | Age: 84
End: 2024-10-03

## 2024-10-15 ENCOUNTER — APPOINTMENT (OUTPATIENT)
Dept: CARDIOLOGY | Facility: CLINIC | Age: 84
End: 2024-10-15
Payer: MEDICARE

## 2024-10-15 PROCEDURE — A9500: CPT

## 2024-10-15 PROCEDURE — 78452 HT MUSCLE IMAGE SPECT MULT: CPT

## 2024-10-15 PROCEDURE — 93015 CV STRESS TEST SUPVJ I&R: CPT

## 2024-10-29 ENCOUNTER — APPOINTMENT (OUTPATIENT)
Dept: CARDIOLOGY | Facility: CLINIC | Age: 84
End: 2024-10-29
Payer: MEDICARE

## 2024-10-29 ENCOUNTER — NON-APPOINTMENT (OUTPATIENT)
Age: 84
End: 2024-10-29

## 2024-10-29 VITALS
SYSTOLIC BLOOD PRESSURE: 108 MMHG | DIASTOLIC BLOOD PRESSURE: 69 MMHG | WEIGHT: 140 LBS | HEIGHT: 68 IN | HEART RATE: 61 BPM | BODY MASS INDEX: 21.22 KG/M2 | OXYGEN SATURATION: 99 %

## 2024-10-29 DIAGNOSIS — I35.0 NONRHEUMATIC AORTIC (VALVE) STENOSIS: ICD-10-CM

## 2024-10-29 DIAGNOSIS — I73.9 PERIPHERAL VASCULAR DISEASE, UNSPECIFIED: ICD-10-CM

## 2024-10-29 DIAGNOSIS — Z01.810 ENCOUNTER FOR PREPROCEDURAL CARDIOVASCULAR EXAMINATION: ICD-10-CM

## 2024-10-29 DIAGNOSIS — I42.9 CARDIOMYOPATHY, UNSPECIFIED: ICD-10-CM

## 2024-10-29 DIAGNOSIS — I70.1 ATHEROSCLEROSIS OF RENAL ARTERY: ICD-10-CM

## 2024-10-29 DIAGNOSIS — D50.9 IRON DEFICIENCY ANEMIA, UNSPECIFIED: ICD-10-CM

## 2024-10-29 DIAGNOSIS — I48.21 PERMANENT ATRIAL FIBRILLATION: ICD-10-CM

## 2024-10-29 DIAGNOSIS — I25.10 ATHEROSCLEROTIC HEART DISEASE OF NATIVE CORONARY ARTERY W/OUT ANGINA PECTORIS: ICD-10-CM

## 2024-10-29 PROCEDURE — 99215 OFFICE O/P EST HI 40 MIN: CPT

## 2024-10-29 PROCEDURE — G2211 COMPLEX E/M VISIT ADD ON: CPT

## 2024-10-29 PROCEDURE — 93000 ELECTROCARDIOGRAM COMPLETE: CPT

## 2024-10-30 PROBLEM — I42.9 CARDIOMYOPATHY: Status: ACTIVE | Noted: 2024-08-30

## 2024-10-30 PROBLEM — I48.21 PERMANENT ATRIAL FIBRILLATION: Status: ACTIVE | Noted: 2024-07-12

## 2024-10-30 PROBLEM — I25.10 CORONARY ARTERY DISEASE WITHOUT ANGINA PECTORIS: Status: ACTIVE | Noted: 2024-10-30

## 2024-10-30 PROBLEM — I35.0 MODERATE AORTIC STENOSIS: Status: ACTIVE | Noted: 2024-07-16

## 2024-10-30 PROBLEM — I70.1: Status: ACTIVE | Noted: 2024-10-30

## 2024-10-30 PROBLEM — I73.9 PAD (PERIPHERAL ARTERY DISEASE): Status: ACTIVE | Noted: 2024-10-30

## 2024-11-01 ENCOUNTER — APPOINTMENT (OUTPATIENT)
Dept: ORTHOPEDIC SURGERY | Facility: CLINIC | Age: 84
End: 2024-11-01
Payer: MEDICARE

## 2024-11-01 ENCOUNTER — RESULT REVIEW (OUTPATIENT)
Age: 84
End: 2024-11-01

## 2024-11-01 ENCOUNTER — OUTPATIENT (OUTPATIENT)
Dept: OUTPATIENT SERVICES | Facility: HOSPITAL | Age: 84
LOS: 1 days | End: 2024-11-01
Payer: MEDICARE

## 2024-11-01 VITALS
BODY MASS INDEX: 21.22 KG/M2 | OXYGEN SATURATION: 100 % | HEART RATE: 71 BPM | WEIGHT: 140 LBS | SYSTOLIC BLOOD PRESSURE: 104 MMHG | DIASTOLIC BLOOD PRESSURE: 70 MMHG | HEIGHT: 68 IN

## 2024-11-01 DIAGNOSIS — E83.10 DISORDER OF IRON METABOLISM, UNSPECIFIED: ICD-10-CM

## 2024-11-01 DIAGNOSIS — Z90.89 ACQUIRED ABSENCE OF OTHER ORGANS: Chronic | ICD-10-CM

## 2024-11-01 DIAGNOSIS — E55.9 VITAMIN D DEFICIENCY, UNSPECIFIED: ICD-10-CM

## 2024-11-01 DIAGNOSIS — Z01.818 ENCOUNTER FOR OTHER PREPROCEDURAL EXAMINATION: ICD-10-CM

## 2024-11-01 DIAGNOSIS — Z22.322 CARRIER OR SUSPECTED CARRIER OF METHICILLIN RESISTANT STAPHYLOCOCCUS AUREUS: ICD-10-CM

## 2024-11-01 DIAGNOSIS — Z98.89 OTHER SPECIFIED POSTPROCEDURAL STATES: Chronic | ICD-10-CM

## 2024-11-01 DIAGNOSIS — M16.0 BILATERAL PRIMARY OSTEOARTHRITIS OF HIP: ICD-10-CM

## 2024-11-01 DIAGNOSIS — Z86.79 PERSONAL HISTORY OF OTHER DISEASES OF THE CIRCULATORY SYSTEM: Chronic | ICD-10-CM

## 2024-11-01 LAB
MRSA SPEC QL CULT: NEGATIVE
STAPH AUREUS (SA): POSITIVE

## 2024-11-01 PROCEDURE — 73502 X-RAY EXAM HIP UNI 2-3 VIEWS: CPT | Mod: 26,LT

## 2024-11-01 PROCEDURE — 99214 OFFICE O/P EST MOD 30 MIN: CPT

## 2024-11-05 LAB
25(OH)D3 SERPL-MCNC: 45.4 NG/ML
ALBUMIN SERPL ELPH-MCNC: 3.7 G/DL
ALP BLD-CCNC: 154 U/L
ALT SERPL-CCNC: 28 U/L
ANION GAP SERPL CALC-SCNC: 13 MMOL/L
AST SERPL-CCNC: 24 U/L
BASOPHILS # BLD AUTO: 0.06 K/UL
BASOPHILS NFR BLD AUTO: 0.7 %
BILIRUB SERPL-MCNC: 0.2 MG/DL
BUN SERPL-MCNC: 27 MG/DL
CALCIUM SERPL-MCNC: 10 MG/DL
CHLORIDE SERPL-SCNC: 102 MMOL/L
CO2 SERPL-SCNC: 27 MMOL/L
CREAT SERPL-MCNC: 0.84 MG/DL
EGFR: 85 ML/MIN/1.73M2
EOSINOPHIL # BLD AUTO: 0.5 K/UL
EOSINOPHIL NFR BLD AUTO: 5.8 %
ESTIMATED AVERAGE GLUCOSE: 154 MG/DL
FRUCTOSAMINE SERPL-MCNC: 311 UMOL/L
GLUCOSE SERPL-MCNC: 78 MG/DL
HBA1C MFR BLD HPLC: 7 %
HCT VFR BLD CALC: 39.8 %
HGB BLD-MCNC: 12.3 G/DL
IMM GRANULOCYTES NFR BLD AUTO: 0.7 %
LYMPHOCYTES # BLD AUTO: 1.89 K/UL
LYMPHOCYTES NFR BLD AUTO: 22.1 %
MAN DIFF?: NORMAL
MCHC RBC-ENTMCNC: 28.9 PG
MCHC RBC-ENTMCNC: 30.9 G/DL
MCV RBC AUTO: 93.6 FL
MONOCYTES # BLD AUTO: 1 K/UL
MONOCYTES NFR BLD AUTO: 11.7 %
NEUTROPHILS # BLD AUTO: 5.06 K/UL
NEUTROPHILS NFR BLD AUTO: 59 %
PLATELET # BLD AUTO: 497 K/UL
POTASSIUM SERPL-SCNC: 5.5 MMOL/L
PREALB SERPL NEPH-MCNC: 16 MG/DL
PROT SERPL-MCNC: 6.9 G/DL
RBC # BLD: 4.25 M/UL
RBC # FLD: 15.5 %
SODIUM SERPL-SCNC: 141 MMOL/L
TRANSFERRIN SERPL-MCNC: 238 MG/DL
WBC # FLD AUTO: 8.57 K/UL

## 2024-11-07 ENCOUNTER — NON-APPOINTMENT (OUTPATIENT)
Age: 84
End: 2024-11-07

## 2024-11-20 PROCEDURE — 73502 X-RAY EXAM HIP UNI 2-3 VIEWS: CPT

## 2024-12-13 ENCOUNTER — NON-APPOINTMENT (OUTPATIENT)
Age: 84
End: 2024-12-13

## 2024-12-16 VITALS
OXYGEN SATURATION: 100 % | TEMPERATURE: 97 F | DIASTOLIC BLOOD PRESSURE: 87 MMHG | HEIGHT: 68 IN | RESPIRATION RATE: 16 BRPM | HEART RATE: 71 BPM | WEIGHT: 141.1 LBS | SYSTOLIC BLOOD PRESSURE: 135 MMHG

## 2024-12-16 RX ORDER — POVIDONE IODINE USP, 10% W/W 10 MG/ML
1 SWAB TOPICAL ONCE
Refills: 0 | Status: COMPLETED | OUTPATIENT
Start: 2024-12-17 | End: 2024-12-17

## 2024-12-16 NOTE — H&P ADULT - PROBLEM SELECTOR PLAN 1
Admit to Orthopaedic Service.  Presents today for elective left total hip replacement  Pt medically stable and cleared for procedure today by Dr. Ferraro and Dr. Urena and Dr. Novak

## 2024-12-16 NOTE — ASU PATIENT PROFILE, ADULT - FALL HARM RISK - RISK INTERVENTIONS

## 2024-12-16 NOTE — H&P ADULT - NSHPPHYSICALEXAM_GEN_ALL_CORE
MSK: Decreased left hip ROM secondary to pain     Remainder of PE per medical clearance note Gen: Alert and oriented, NAD  MSK: Decreased left hip ROM secondary to pain   No evidence of deformity or open wound  2+ DP pulses palpable bilaterally, skin wwp, cap refill brisk   SILT to bilateral distal lower extremities  EHL/FHL/TA/GS 5/5 bilaterally    Remainder of PE per medical clearance note

## 2024-12-16 NOTE — H&P ADULT - HISTORY OF PRESENT ILLNESS
84yoM with left hip pain x    CAD s/p stent, + hx of CVA on pradaxa and baby aspirin, Last dose pradaxa:   Patient HAS/HAS NOT been using opioid pain medications on a regular basis for more than 90 days prior to the date of surgery.    Presents today for elective left total hip replacement with Dr. Echeverria  84yoM with left hip pain x    CAD s/p stent, + hx of CVA, afib on pradaxa and baby aspirin, Last dose pradaxa:   Patient HAS/HAS NOT been using opioid pain medications on a regular basis for more than 90 days prior to the date of surgery.    Presents today for elective left total hip replacement with Dr. Echeverria  84yoM with left hip pain x 6 months without inciting accident or injury. Patient endorses pain with movement. Denies prior surgeries to the hip or injection. He takes Tylenol at home for pain with some relief of symptoms. Pain persists despite conservative measures.     CAD s/p stent, + hx of CVA, afib on pradaxa and baby aspirin, Last dose pradaxa:   Patient HAS/HAS NOT been using opioid pain medications on a regular basis for more than 90 days prior to the date of surgery.    Presents today for elective left total hip replacement with Dr. Echeverria  84yoM with left hip pain x 6 months without inciting accident or injury. Patient endorses pain with movement. Denies prior surgeries to the hip or injection. He takes Tylenol at home for pain with some relief of symptoms. Pain persists despite conservative measures. Ambulates via wheelchair, occasionally uses a walker.    CAD s/p stent, + hx of CVA, afib on pradaxa and baby aspirin, Last dose pradaxa:   Patient HAS NOT been using opioid pain medications on a regular basis for more than 90 days prior to the date of surgery.    Presents today for elective left total hip replacement with Dr. Echeverria

## 2024-12-16 NOTE — ASU PATIENT PROFILE, ADULT - NSICDXPASTSURGICALHX_GEN_ALL_CORE_FT
PAST SURGICAL HISTORY:  H/O eye surgery b/l cataracts    History of femoral angiogram with stent placement    History of tonsillectomy

## 2024-12-16 NOTE — H&P ADULT - PROBLEM SELECTOR PLAN 7
h/o with admission for hematochezia in 10/23/24. Endoscopy unremarkable, seen outpatient and cleared by GI for surgery.

## 2024-12-16 NOTE — ASU PATIENT PROFILE, ADULT - NSICDXPASTMEDICALHX_GEN_ALL_CORE_FT
PAST MEDICAL HISTORY:  Afib     CVA (cerebral vascular accident) 2014    DM (diabetes mellitus)     Hepatitis     HTN (hypertension)     Intermittent claudication     Osteoarthritis     Renal artery stenosis

## 2024-12-16 NOTE — H&P ADULT - PROBLEM SELECTOR PLAN 2
h/o, on pradaxa, resume post op h/o, on pradaxa, resume post op with lower dvt ppx dose per Cardiology. Per cards clearance, resume full dose 150mg bid as soon as safe and no longer than 3-5 days.

## 2024-12-16 NOTE — H&P ADULT - NSHPLABSRESULTS_GEN_ALL_CORE
Preop CBC, BMP, PT/INR, PTT within normal range and reviewed per medical clearance  H/H: 10.6/33.6  Platelets 612***  PTT 69.3, repeat DOS  Cr: 0.85  Preop EKG with afib and reviewed per medical clearance  TTE 6/24/24: report only LVEF 30-35%  Stress test 10/17/24 normal myocardial perfusion with no evidence of infraction   3M: DOS  T + S: DOS  MRSA negative  MSSA positive Preop CBC, BMP, PT/INR, PTT within normal range and reviewed per medical clearance  H/H: 10.6/33.6  Platelets 612***  PTT 69.3, repeat DOS  A1c 6.9  Cr: 0.85  Preop EKG with afib and reviewed per medical clearance  TTE 6/24/24: report only LVEF 30-35%  Stress test 10/17/24 normal myocardial perfusion with no evidence of infraction   3M: DOS  T + S: DOS  MRSA negative  MSSA positive

## 2024-12-17 ENCOUNTER — RESULT REVIEW (OUTPATIENT)
Age: 84
End: 2024-12-17

## 2024-12-17 ENCOUNTER — TRANSCRIPTION ENCOUNTER (OUTPATIENT)
Age: 84
End: 2024-12-17

## 2024-12-17 ENCOUNTER — INPATIENT (INPATIENT)
Facility: HOSPITAL | Age: 84
LOS: 21 days | Discharge: HOME HEALTH | DRG: 470 | End: 2025-01-08
Attending: SPECIALIST | Admitting: SPECIALIST
Payer: MEDICARE

## 2024-12-17 ENCOUNTER — APPOINTMENT (OUTPATIENT)
Dept: ORTHOPEDIC SURGERY | Facility: HOSPITAL | Age: 84
End: 2024-12-17

## 2024-12-17 DIAGNOSIS — K62.7 RADIATION PROCTITIS: ICD-10-CM

## 2024-12-17 DIAGNOSIS — Z95.5 PRESENCE OF CORONARY ANGIOPLASTY IMPLANT AND GRAFT: ICD-10-CM

## 2024-12-17 DIAGNOSIS — Z92.3 PERSONAL HISTORY OF IRRADIATION: ICD-10-CM

## 2024-12-17 DIAGNOSIS — R05.8 OTHER SPECIFIED COUGH: ICD-10-CM

## 2024-12-17 DIAGNOSIS — I11.0 HYPERTENSIVE HEART DISEASE WITH HEART FAILURE: ICD-10-CM

## 2024-12-17 DIAGNOSIS — I25.10 ATHEROSCLEROTIC HEART DISEASE OF NATIVE CORONARY ARTERY WITHOUT ANGINA PECTORIS: ICD-10-CM

## 2024-12-17 DIAGNOSIS — K75.9 INFLAMMATORY LIVER DISEASE, UNSPECIFIED: ICD-10-CM

## 2024-12-17 DIAGNOSIS — Z86.79 PERSONAL HISTORY OF OTHER DISEASES OF THE CIRCULATORY SYSTEM: Chronic | ICD-10-CM

## 2024-12-17 DIAGNOSIS — I63.9 CEREBRAL INFARCTION, UNSPECIFIED: ICD-10-CM

## 2024-12-17 DIAGNOSIS — I48.91 UNSPECIFIED ATRIAL FIBRILLATION: ICD-10-CM

## 2024-12-17 DIAGNOSIS — Z86.73 PERSONAL HISTORY OF TRANSIENT ISCHEMIC ATTACK (TIA), AND CEREBRAL INFARCTION WITHOUT RESIDUAL DEFICITS: ICD-10-CM

## 2024-12-17 DIAGNOSIS — Z98.89 OTHER SPECIFIED POSTPROCEDURAL STATES: Chronic | ICD-10-CM

## 2024-12-17 DIAGNOSIS — I70.1 ATHEROSCLEROSIS OF RENAL ARTERY: ICD-10-CM

## 2024-12-17 DIAGNOSIS — N39.0 URINARY TRACT INFECTION, SITE NOT SPECIFIED: ICD-10-CM

## 2024-12-17 DIAGNOSIS — M16.12 UNILATERAL PRIMARY OSTEOARTHRITIS, LEFT HIP: ICD-10-CM

## 2024-12-17 DIAGNOSIS — E11.9 TYPE 2 DIABETES MELLITUS WITHOUT COMPLICATIONS: ICD-10-CM

## 2024-12-17 DIAGNOSIS — I50.22 CHRONIC SYSTOLIC (CONGESTIVE) HEART FAILURE: ICD-10-CM

## 2024-12-17 DIAGNOSIS — Z79.01 LONG TERM (CURRENT) USE OF ANTICOAGULANTS: ICD-10-CM

## 2024-12-17 DIAGNOSIS — Z91.09 OTHER ALLERGY STATUS, OTHER THAN TO DRUGS AND BIOLOGICAL SUBSTANCES: ICD-10-CM

## 2024-12-17 DIAGNOSIS — Z85.46 PERSONAL HISTORY OF MALIGNANT NEOPLASM OF PROSTATE: ICD-10-CM

## 2024-12-17 DIAGNOSIS — Z90.89 ACQUIRED ABSENCE OF OTHER ORGANS: Chronic | ICD-10-CM

## 2024-12-17 DIAGNOSIS — N40.1 BENIGN PROSTATIC HYPERPLASIA WITH LOWER URINARY TRACT SYMPTOMS: ICD-10-CM

## 2024-12-17 DIAGNOSIS — Z79.84 LONG TERM (CURRENT) USE OF ORAL HYPOGLYCEMIC DRUGS: ICD-10-CM

## 2024-12-17 DIAGNOSIS — I35.0 NONRHEUMATIC AORTIC (VALVE) STENOSIS: ICD-10-CM

## 2024-12-17 DIAGNOSIS — Z90.79 ACQUIRED ABSENCE OF OTHER GENITAL ORGAN(S): ICD-10-CM

## 2024-12-17 DIAGNOSIS — I10 ESSENTIAL (PRIMARY) HYPERTENSION: ICD-10-CM

## 2024-12-17 DIAGNOSIS — K59.00 CONSTIPATION, UNSPECIFIED: ICD-10-CM

## 2024-12-17 DIAGNOSIS — Z79.82 LONG TERM (CURRENT) USE OF ASPIRIN: ICD-10-CM

## 2024-12-17 DIAGNOSIS — R33.8 OTHER RETENTION OF URINE: ICD-10-CM

## 2024-12-17 LAB
ALBUMIN SERPL ELPH-MCNC: 3.3 G/DL — SIGNIFICANT CHANGE UP (ref 3.3–5)
ALP SERPL-CCNC: 144 U/L — HIGH (ref 40–120)
ALT FLD-CCNC: 17 U/L — SIGNIFICANT CHANGE UP (ref 10–45)
ANION GAP SERPL CALC-SCNC: 11 MMOL/L — SIGNIFICANT CHANGE UP (ref 5–17)
APTT BLD: 39 SEC — HIGH (ref 24.5–35.6)
AST SERPL-CCNC: 17 U/L — SIGNIFICANT CHANGE UP (ref 10–40)
BILIRUB SERPL-MCNC: 0.4 MG/DL — SIGNIFICANT CHANGE UP (ref 0.2–1.2)
BUN SERPL-MCNC: 32 MG/DL — HIGH (ref 7–23)
CALCIUM SERPL-MCNC: 9.2 MG/DL — SIGNIFICANT CHANGE UP (ref 8.4–10.5)
CHLORIDE SERPL-SCNC: 101 MMOL/L — SIGNIFICANT CHANGE UP (ref 96–108)
CO2 SERPL-SCNC: 26 MMOL/L — SIGNIFICANT CHANGE UP (ref 22–31)
CREAT SERPL-MCNC: 0.73 MG/DL — SIGNIFICANT CHANGE UP (ref 0.5–1.3)
EGFR: 89 ML/MIN/1.73M2 — SIGNIFICANT CHANGE UP
GLUCOSE SERPL-MCNC: 132 MG/DL — HIGH (ref 70–99)
HCT VFR BLD CALC: 35.3 % — LOW (ref 39–50)
HGB BLD-MCNC: 10.8 G/DL — LOW (ref 13–17)
INR BLD: 1.28 — HIGH (ref 0.85–1.16)
MCHC RBC-ENTMCNC: 28.5 PG — SIGNIFICANT CHANGE UP (ref 27–34)
MCHC RBC-ENTMCNC: 30.6 G/DL — LOW (ref 32–36)
MCV RBC AUTO: 93.1 FL — SIGNIFICANT CHANGE UP (ref 80–100)
NRBC # BLD: 0 /100 WBCS — SIGNIFICANT CHANGE UP (ref 0–0)
PLATELET # BLD AUTO: 596 K/UL — HIGH (ref 150–400)
POTASSIUM SERPL-MCNC: 4.1 MMOL/L — SIGNIFICANT CHANGE UP (ref 3.5–5.3)
POTASSIUM SERPL-SCNC: 4.1 MMOL/L — SIGNIFICANT CHANGE UP (ref 3.5–5.3)
PROT SERPL-MCNC: 8.2 G/DL — SIGNIFICANT CHANGE UP (ref 6–8.3)
PROTHROM AB SERPL-ACNC: 14.7 SEC — HIGH (ref 9.9–13.4)
RBC # BLD: 3.79 M/UL — LOW (ref 4.2–5.8)
RBC # FLD: 14.6 % — HIGH (ref 10.3–14.5)
SODIUM SERPL-SCNC: 138 MMOL/L — SIGNIFICANT CHANGE UP (ref 135–145)
WBC # BLD: 10.75 K/UL — HIGH (ref 3.8–10.5)
WBC # FLD AUTO: 10.75 K/UL — HIGH (ref 3.8–10.5)

## 2024-12-17 PROCEDURE — 27130 TOTAL HIP ARTHROPLASTY: CPT | Mod: LT

## 2024-12-17 PROCEDURE — 72170 X-RAY EXAM OF PELVIS: CPT | Mod: 26

## 2024-12-17 DEVICE — SCREW CANC SPHER 6.5X25MM: Type: IMPLANTABLE DEVICE | Status: FUNCTIONAL

## 2024-12-17 DEVICE — SCREW CANC SPHER 6.5X30MM: Type: IMPLANTABLE DEVICE | Status: FUNCTIONAL

## 2024-12-17 DEVICE — IMPLANTABLE DEVICE: Type: IMPLANTABLE DEVICE | Status: FUNCTIONAL

## 2024-12-17 DEVICE — INSERT ACET OR3O DM XLPE 28/42: Type: IMPLANTABLE DEVICE | Status: FUNCTIONAL

## 2024-12-17 DEVICE — FEM HD TAPER OXINIUM 28MM: Type: IMPLANTABLE DEVICE | Status: FUNCTIONAL

## 2024-12-17 DEVICE — CABLE C CLAMP ACCORD 2.0MM: Type: IMPLANTABLE DEVICE | Status: FUNCTIONAL

## 2024-12-17 DEVICE — LINER ACET OR3O DUAL MOBILITY 42/54: Type: IMPLANTABLE DEVICE | Status: FUNCTIONAL

## 2024-12-17 RX ORDER — FAMOTIDINE 20 MG/1
20 TABLET, FILM COATED ORAL DAILY
Refills: 0 | Status: DISCONTINUED | OUTPATIENT
Start: 2024-12-17 | End: 2025-01-08

## 2024-12-17 RX ORDER — HYDROMORPHONE HCL 4 MG
0.5 TABLET ORAL ONCE
Refills: 0 | Status: DISCONTINUED | OUTPATIENT
Start: 2024-12-17 | End: 2025-01-08

## 2024-12-17 RX ORDER — CEFAZOLIN SODIUM 1 G
2000 VIAL (EA) INJECTION EVERY 8 HOURS
Refills: 0 | Status: COMPLETED | OUTPATIENT
Start: 2024-12-17 | End: 2024-12-18

## 2024-12-17 RX ORDER — DEXTROSE MONOHYDRATE 25 G/50ML
12.5 INJECTION, SOLUTION INTRAVENOUS ONCE
Refills: 0 | Status: DISCONTINUED | OUTPATIENT
Start: 2024-12-17 | End: 2025-01-08

## 2024-12-17 RX ORDER — DEXTROSE MONOHYDRATE 25 G/50ML
15 INJECTION, SOLUTION INTRAVENOUS ONCE
Refills: 0 | Status: DISCONTINUED | OUTPATIENT
Start: 2024-12-17 | End: 2025-01-08

## 2024-12-17 RX ORDER — TRAMADOL HYDROCHLORIDE 50 MG/1
50 TABLET ORAL EVERY 6 HOURS
Refills: 0 | Status: DISCONTINUED | OUTPATIENT
Start: 2024-12-17 | End: 2024-12-23

## 2024-12-17 RX ORDER — SENNOSIDES 8.6 MG/1
2 TABLET, FILM COATED ORAL AT BEDTIME
Refills: 0 | Status: DISCONTINUED | OUTPATIENT
Start: 2024-12-17 | End: 2025-01-08

## 2024-12-17 RX ORDER — B COMPLEX, C NO.20/FOLIC ACID 1 MG
1 CAPSULE ORAL DAILY
Refills: 0 | Status: DISCONTINUED | OUTPATIENT
Start: 2024-12-17 | End: 2025-01-08

## 2024-12-17 RX ORDER — ACETAMINOPHEN 80 MG/.8ML
1000 SOLUTION/ DROPS ORAL EVERY 8 HOURS
Refills: 0 | Status: DISCONTINUED | OUTPATIENT
Start: 2024-12-17 | End: 2025-01-08

## 2024-12-17 RX ORDER — DEXTROSE MONOHYDRATE 25 G/50ML
25 INJECTION, SOLUTION INTRAVENOUS ONCE
Refills: 0 | Status: DISCONTINUED | OUTPATIENT
Start: 2024-12-17 | End: 2025-01-08

## 2024-12-17 RX ORDER — GLUCAGON INJECTION, SOLUTION 0.5 MG/.1ML
1 INJECTION, SOLUTION SUBCUTANEOUS ONCE
Refills: 0 | Status: DISCONTINUED | OUTPATIENT
Start: 2024-12-17 | End: 2025-01-08

## 2024-12-17 RX ORDER — CHLORHEXIDINE GLUCONATE 1.2 MG/ML
1 RINSE ORAL ONCE
Refills: 0 | Status: COMPLETED | OUTPATIENT
Start: 2024-12-17 | End: 2024-12-17

## 2024-12-17 RX ORDER — ASPIRIN 81 MG
81 TABLET, DELAYED RELEASE (ENTERIC COATED) ORAL DAILY
Refills: 0 | Status: DISCONTINUED | OUTPATIENT
Start: 2024-12-17 | End: 2025-01-08

## 2024-12-17 RX ORDER — SODIUM CHLORIDE 9 MG/ML
1000 INJECTION, SOLUTION INTRAVENOUS
Refills: 0 | Status: DISCONTINUED | OUTPATIENT
Start: 2024-12-17 | End: 2024-12-21

## 2024-12-17 RX ORDER — APREPITANT 40 MG/1
40 CAPSULE ORAL ONCE
Refills: 0 | Status: COMPLETED | OUTPATIENT
Start: 2024-12-17 | End: 2024-12-17

## 2024-12-17 RX ORDER — AMIODARONE HYDROCHLORIDE 200 MG/1
100 TABLET ORAL DAILY
Refills: 0 | Status: DISCONTINUED | OUTPATIENT
Start: 2024-12-17 | End: 2025-01-08

## 2024-12-17 RX ORDER — SODIUM CHLORIDE 9 MG/ML
1000 INJECTION, SOLUTION INTRAVENOUS
Refills: 0 | Status: DISCONTINUED | OUTPATIENT
Start: 2024-12-17 | End: 2025-01-08

## 2024-12-17 RX ORDER — DABIGATRAN ETEXILATE 110 MG/1
150 CAPSULE ORAL EVERY 12 HOURS
Refills: 0 | Status: DISCONTINUED | OUTPATIENT
Start: 2024-12-18 | End: 2025-01-08

## 2024-12-17 RX ORDER — METOPROLOL TARTRATE 50 MG
25 TABLET ORAL DAILY
Refills: 0 | Status: DISCONTINUED | OUTPATIENT
Start: 2024-12-17 | End: 2025-01-08

## 2024-12-17 RX ORDER — POLYETHYLENE GLYCOL 3350 17 G/DOSE
17 POWDER (GRAM) ORAL AT BEDTIME
Refills: 0 | Status: DISCONTINUED | OUTPATIENT
Start: 2024-12-17 | End: 2024-12-23

## 2024-12-17 RX ORDER — ONDANSETRON 4 MG/1
4 TABLET ORAL EVERY 6 HOURS
Refills: 0 | Status: DISCONTINUED | OUTPATIENT
Start: 2024-12-17 | End: 2025-01-08

## 2024-12-17 RX ORDER — INSULIN LISPRO 100/ML
VIAL (ML) SUBCUTANEOUS
Refills: 0 | Status: DISCONTINUED | OUTPATIENT
Start: 2024-12-17 | End: 2025-01-08

## 2024-12-17 RX ORDER — ACETAMINOPHEN 80 MG/.8ML
1000 SOLUTION/ DROPS ORAL ONCE
Refills: 0 | Status: COMPLETED | OUTPATIENT
Start: 2024-12-17 | End: 2024-12-17

## 2024-12-17 RX ORDER — LISINOPRIL 30 MG/1
20 TABLET ORAL DAILY
Refills: 0 | Status: DISCONTINUED | OUTPATIENT
Start: 2024-12-18 | End: 2025-01-08

## 2024-12-17 RX ORDER — MAGNESIUM HYDROXIDE 400 MG/5ML
30 SUSPENSION, ORAL (FINAL DOSE FORM) ORAL DAILY
Refills: 0 | Status: DISCONTINUED | OUTPATIENT
Start: 2024-12-17 | End: 2025-01-08

## 2024-12-17 RX ADMIN — Medication 100 MILLIGRAM(S): at 19:23

## 2024-12-17 RX ADMIN — ACETAMINOPHEN 1000 MILLIGRAM(S): 80 SOLUTION/ DROPS ORAL at 10:47

## 2024-12-17 RX ADMIN — POVIDONE IODINE USP, 10% W/W 1 APPLICATION(S): 10 SWAB TOPICAL at 10:48

## 2024-12-17 RX ADMIN — Medication 25 MILLIGRAM(S): at 21:10

## 2024-12-17 RX ADMIN — APREPITANT 40 MILLIGRAM(S): 40 CAPSULE ORAL at 10:47

## 2024-12-17 RX ADMIN — Medication 5 MILLIGRAM(S): at 21:11

## 2024-12-17 RX ADMIN — CHLORHEXIDINE GLUCONATE 1 APPLICATION(S): 1.2 RINSE ORAL at 10:48

## 2024-12-17 RX ADMIN — AMIODARONE HYDROCHLORIDE 100 MILLIGRAM(S): 200 TABLET ORAL at 21:10

## 2024-12-17 NOTE — PHYSICAL THERAPY INITIAL EVALUATION ADULT - ADDITIONAL COMMENTS
Pt poor historian states he lives alone in one level home no TIFFANIE. pt has 24/7 HHA owns RW and rollator. Pt was able to amb short distances with RW and assistance from A PTA. Pt has W/C for long distances.

## 2024-12-17 NOTE — PHYSICAL THERAPY INITIAL EVALUATION ADULT - GENERAL OBSERVATIONS, REHAB EVAL
Pt received semi supine in bed +hep lock +tele +dressing on L hip CDI +SCD. Pt required mod A for bed mobility max A for STS with RW unable to take steps. p;t was left as received in NAD and RN aware.

## 2024-12-17 NOTE — PHYSICAL THERAPY INITIAL EVALUATION ADULT - PERTINENT HX OF CURRENT PROBLEM, REHAB EVAL
84yoM with left hip pain x 6 months without inciting accident or injury. Patient endorses pain with movement. Denies prior surgeries to the hip or injection. He takes Tylenol at home for pain with some relief of symptoms. Pain persists despite conservative measures. Ambulates via wheelchair, occasionally uses a walker.    CAD s/p stent, + hx of CVA, afib on pradaxa and baby aspirin, Last dose pradaxa:   Patient HAS NOT been using opioid pain medications on a regular basis for more than 90 days prior to the date of surgery.    Presents today for elective left total hip replacement with Dr. Echeverria

## 2024-12-18 PROCEDURE — 99223 1ST HOSP IP/OBS HIGH 75: CPT

## 2024-12-18 RX ORDER — LIDOCAINE HYDROCHLORIDE 10 MG/ML
10 INJECTION INFILTRATION; PERINEURAL ONCE
Refills: 0 | Status: COMPLETED | OUTPATIENT
Start: 2024-12-18 | End: 2024-12-18

## 2024-12-18 RX ADMIN — Medication 5 MILLIGRAM(S): at 05:33

## 2024-12-18 RX ADMIN — Medication 2: at 22:31

## 2024-12-18 RX ADMIN — Medication 17 GRAM(S): at 21:32

## 2024-12-18 RX ADMIN — Medication 25 MILLIGRAM(S): at 05:32

## 2024-12-18 RX ADMIN — DABIGATRAN ETEXILATE 150 MILLIGRAM(S): 110 CAPSULE ORAL at 17:51

## 2024-12-18 RX ADMIN — ACETAMINOPHEN 1000 MILLIGRAM(S): 80 SOLUTION/ DROPS ORAL at 05:32

## 2024-12-18 RX ADMIN — Medication 1 TABLET(S): at 12:05

## 2024-12-18 RX ADMIN — ACETAMINOPHEN 1000 MILLIGRAM(S): 80 SOLUTION/ DROPS ORAL at 21:32

## 2024-12-18 RX ADMIN — LIDOCAINE HYDROCHLORIDE 10 MILLILITER(S): 10 INJECTION INFILTRATION; PERINEURAL at 06:50

## 2024-12-18 RX ADMIN — SENNOSIDES 2 TABLET(S): 8.6 TABLET, FILM COATED ORAL at 21:32

## 2024-12-18 RX ADMIN — FAMOTIDINE 20 MILLIGRAM(S): 20 TABLET, FILM COATED ORAL at 12:05

## 2024-12-18 RX ADMIN — AMIODARONE HYDROCHLORIDE 100 MILLIGRAM(S): 200 TABLET ORAL at 05:33

## 2024-12-18 RX ADMIN — TRAMADOL HYDROCHLORIDE 50 MILLIGRAM(S): 50 TABLET ORAL at 17:51

## 2024-12-18 RX ADMIN — Medication 81 MILLIGRAM(S): at 12:05

## 2024-12-18 RX ADMIN — DABIGATRAN ETEXILATE 150 MILLIGRAM(S): 110 CAPSULE ORAL at 06:30

## 2024-12-18 RX ADMIN — Medication 2: at 09:19

## 2024-12-18 RX ADMIN — LISINOPRIL 20 MILLIGRAM(S): 30 TABLET ORAL at 05:33

## 2024-12-18 RX ADMIN — Medication 100 MILLIGRAM(S): at 05:52

## 2024-12-18 RX ADMIN — ACETAMINOPHEN 1000 MILLIGRAM(S): 80 SOLUTION/ DROPS ORAL at 15:07

## 2024-12-18 NOTE — CONSULT NOTE ADULT - SUBJECTIVE AND OBJECTIVE BOX
CHERI MARK  84y/Male    Patient is a 84y old  Male who presents with a chief complaint of left hip pain (18 Dec 2024 08:47).    Mr. Cheri Mark is an 84/M with CAD, Atrial fibrillation on Pradaxa, CVA (?), DM type 2, HTN and osteoarthritis who presented with chronic left hip pain x 6 months that failed conservative management and admitted for an elective YO by Dr. Echeverria.    Post operatively he denies significant left hip pain. He denies headache, blurring of vision, sore throat, dizziness, vomiting, chest pain, dyspnea, abdominal pain, diarrhea, dysuria, itching, depressed mood.    REVIEW OF SYSTEMS:  The rest of the 12 systems reviewed and found negative except as mentioned above    PAST MEDICAL & SURGICAL HISTORY:  per HPI    PERSONAL SOCIAL HISTORY:  Denies smoking history, recreational drug use, etoh use (16 Dec 2024 12:49)    FAMILY HISTORY:  Father had MI in his 60s    HOME MEDICATIONS:  per EHR  Aspirin Enteric Coated 81 mg oral delayed release tablet: Last Dose Taken:  , 1 tab(s) orally once a day MDD:1 tab  amiodarone 100 mg oral tablet: Last Dose Taken:  ,  orally once a day  metoprolol: Last Dose Taken:  , 20 milligram(s) orally once a day  amLODIPine 5 mg oral tablet: Last Dose Taken:  , 1 tab(s) orally once a day  lisinopril 20 mg oral tablet: Last Dose Taken:  , 1 tab(s) orally once a day  metFORMIN 850 mg oral tablet: Last Dose Taken:  , 1 tab(s) orally once a day (in the morning) Do not restart until Sat 7/30  Pradaxa 150 mg oral capsule: Last Dose Taken: 12-Dec-2024 , 1 cap(s) orally 2 times a day    MEDICATIONS  (STANDING):  acetaminophen     Tablet .. 1000 milliGRAM(s) Oral every 8 hours  aMIOdarone    Tablet 100 milliGRAM(s) Oral daily  amLODIPine   Tablet 5 milliGRAM(s) Oral daily  aspirin enteric coated 81 milliGRAM(s) Oral daily  dabigatran 150 milliGRAM(s) Oral every 12 hours  dextrose 5%. 1000 milliLiter(s) (50 mL/Hr) IV Continuous <Continuous>  dextrose 5%. 1000 milliLiter(s) (100 mL/Hr) IV Continuous <Continuous>  dextrose 50% Injectable 25 Gram(s) IV Push once  dextrose 50% Injectable 12.5 Gram(s) IV Push once  dextrose 50% Injectable 25 Gram(s) IV Push once  famotidine    Tablet 20 milliGRAM(s) Oral daily  glucagon  Injectable 1 milliGRAM(s) IntraMuscular once  HYDROmorphone  Injectable 0.5 milliGRAM(s) IV Push once  insulin lispro (ADMELOG) corrective regimen sliding scale   SubCutaneous Before meals and at bedtime  lactated ringers. 1000 milliLiter(s) (75 mL/Hr) IV Continuous <Continuous>  lisinopril 20 milliGRAM(s) Oral daily  metoprolol succinate ER 25 milliGRAM(s) Oral daily  multivitamin 1 Tablet(s) Oral daily  polyethylene glycol 3350 17 Gram(s) Oral at bedtime  senna 2 Tablet(s) Oral at bedtime    MEDICATIONS  (PRN):  dextrose Oral Gel 15 Gram(s) Oral once PRN Blood Glucose LESS THAN 70 milliGRAM(s)/deciliter  magnesium hydroxide Suspension 30 milliLiter(s) Oral daily PRN Constipation  ondansetron Injectable 4 milliGRAM(s) IV Push every 6 hours PRN Nausea and/or Vomiting  traMADol 50 milliGRAM(s) Oral every 6 hours PRN Mild Pain (1 - 3)    T(C): 36.5 (12-18-24 @ 12:00), Max: 36.6 (12-18-24 @ 08:45)  HR: 61 (12-18-24 @ 12:00) (61 - 85)  BP: 113/56 (12-18-24 @ 12:00) (111/51 - 177/89)  RR: 18 (12-18-24 @ 12:00) (10 - 19)  SpO2: 95% (12-18-24 @ 12:00) (95% - 100%)  Wt(kg): --Vital Signs Last 24 Hrs  T(C): 36.5 (18 Dec 2024 12:00), Max: 36.6 (18 Dec 2024 08:45)  T(F): 97.7 (18 Dec 2024 12:00), Max: 97.8 (18 Dec 2024 08:45)  HR: 61 (18 Dec 2024 12:00) (61 - 85)  BP: 113/56 (18 Dec 2024 12:00) (111/51 - 177/89)  BP(mean): 112 (17 Dec 2024 20:58) (87 - 121)  RR: 18 (18 Dec 2024 12:00) (10 - 19)  SpO2: 95% (18 Dec 2024 12:00) (95% - 100%)    Parameters below as of 18 Dec 2024 12:00  Patient On (Oxygen Delivery Method): room air    Oxygen Saturation Index= Unable to calculate   [Based on FiO2 = Unknown, SpO2 = 95(12/18/2024 12:00), MAP = Unknown]  Daily       PHYSICAL EXAM:  GENERAL: NAD  HEAD:  Atraumatic, Normocephalic  EYES: EOMI, conjunctiva and sclera clear  ENMT: Moist mucous membranes  NECK: Supple, No JVD  NERVOUS SYSTEM:  Alert & Oriented X3, Good concentration  CHEST/LUNG: Clear to auscultation bilaterally  HEART: Irregularly irregular rhythm  ABDOMEN: Soft, Nontender, Nondistended; Bowel sounds present  EXTREMITIES:  left hip dressing c/d/i; 2+ Peripheral Pulses  PSYCH: Normal mood and affect    CAPILLARY BLOOD GLUCOSE  POCT Blood Glucose.: 112 mg/dL (18 Dec 2024 12:46)  POCT Blood Glucose.: 166 mg/dL (18 Dec 2024 08:27)  POCT Blood Glucose.: 148 mg/dL (17 Dec 2024 21:25)    Consultant(s) Notes Reviewed:  [x ] YES  [ ] NO  Care Discussed with Consultants/Other Providers [ x] YES  [ ] NO    LABS:  CBC   12-18-24 @ 10:00  Hematcorit 30.2  Hemoglobin 9.0  Mean Cell Hemoglobin 27.4  Platelet Count-Automated 517  RBC Count 3.29  Red Cell Distrib Width 14.3  Wbc Count 11.39    BMP  12-18-24 @ 10:00  Anion Gap. Serum 12  Blood Urea Nitrogen,Serm 28  Calcium, Total Serum 8.5  Carbon Dioxide, Serum 22  Chloride, Serum 103  Creatinine, Serum 0.78  eGFR in  --  eGFR in Non Afican American --  Gloucose, serum 155  Potassium, Serum 5.2  Sodium, Serum 137    12-17-24 @ 11:26  Anion Gap. Serum 11  Blood Urea Nitrogen,Serm 32  Calcium, Total Serum 9.2  Carbon Dioxide, Serum 26  Chloride, Serum 101  Creatinine, Serum 0.73  eGFR in  --  eGFR in Non Afican American --  Gloucose, serum 132  Potassium, Serum 4.1  Sodium, Serum 138    CMP  12-18-24 @ 10:00  Deepika Aminotransferase(ALT/SGPT)--  Albumin, Serum --  Alkaline Phosphatase, Serum --  Anion Gap, Serum 12  Aspartate Aminotransferase (AST/SGOT)--  Bilirubin Total, Serum --  Blood Urea Nitrogen, Serum 28  Calcium,Total Serum 8.5  Carbon Dioxide, Serum 22  Chloride, Serum 103  Creatinine, Serum 0.78  eGFR if  --  eGFR if Non African American --  Glucose, Serum 155  Potassium, Serum 5.2  Protein Total, Serum --  Sodium, Serum 137    PT/INR  12-17-24 @ 11:26  INR 1.28  Prothrombin Time Comment --  Prothrobin Time, [id number]

## 2024-12-18 NOTE — CONSULT NOTE ADULT - ASSESSMENT
Mr. Zuhair Mark is an 84/M with CAD, Atrial fibrillation on Pradaxa, CVA (?), DM type 2, HTN and osteoarthritis who presented with chronic left hip pain x 6 months that failed conservative management and admitted for an elective YO by Dr. Echeverria.    Recommendations:    #Left hip osteoarthritis   #Post op state  - Provide adequate analgesia, Incentive spirometry, mobilize with fall precautions, bowel regimen, DVT prophylaxis  - PT/OT    #Acute blood loss anemia  - Monitor Hgb  - Transfuse for Hgb < 7  - Ensure Type and Screen available    #Leucocytosis  - likely reactive  - afebrile and denies symptoms of infection  - monitor temp and WBC trend    #CAD  #Atrial fibrillation  - rate controlled on Metoprolol, Amiodarone  - on Pradaxa  - chest pain free    #HTN  - on Metoprolol, Amlodipine and Lisinopril  - ensure BP parameters in place  - monitor K and Cr while on Lisinopril     #DM type 2  - A1C 7  - on correctional insulin; goal glucose 100-180 mg/dL  - can resume Metformin 48 hours post op if renal function stable and no contraindication from orthopedics team  - DM diet    DVT ppx: Dabigatran    Feel free to reach out for any questions. Recommendations discussed with primary team.

## 2024-12-18 NOTE — PROGRESS NOTE ADULT - SUBJECTIVE AND OBJECTIVE BOX
Ortho Note    Pt seen and examined at bedside during AM rounds. Pt reports left hip feels like a miracle. Pt comfortable without complaints, pain controlled. Tolerating PO diet. Patient states he sees outpatient urologist Dr. Charles Daniels and last saw him in 9/2024. Voiding with juarez. Patient and family requesting LUCRECIA post op.   Denies CP, SOB, N/V, new numbness/tingling     Vital Signs Last 24 Hrs  T(C): 36.5 (12-18-24 @ 12:00), Max: 36.5 (12-18-24 @ 12:00)  T(F): 97.7 (12-18-24 @ 12:00), Max: 97.7 (12-18-24 @ 12:00)  HR: 61 (12-18-24 @ 12:00) (61 - 61)  BP: 113/56 (12-18-24 @ 12:00) (113/56 - 113/56)  BP(mean): --  RR: 18 (12-18-24 @ 12:00) (18 - 18)  SpO2: 95% (12-18-24 @ 12:00) (95% - 95%)  I&O's Summary    17 Dec 2024 07:01  -  18 Dec 2024 07:00  --------------------------------------------------------  IN: 0 mL / OUT: 0 mL / NET: 0 mL    18 Dec 2024 07:01  -  18 Dec 2024 17:25  --------------------------------------------------------  IN: 675 mL / OUT: 500 mL / NET: 175 mL        General: Pt Alert and oriented, NAD  DSG C/D/I- Prineo left hip  Pulses: 2+ DP bilaterally  Sensation: SILT distally bilateral lower extremities  Motor:   EHL/FHL/TA/GS: 5/5 bilaterally                          9.0    11.39 )-----------( 517      ( 18 Dec 2024 10:00 )             30.2     12-18    137  |  103  |  28[H]  ----------------------------<  155[H]  5.2   |  22  |  0.78    Ca    8.5      18 Dec 2024 10:00    TPro  8.2  /  Alb  3.3  /  TBili  0.4  /  DBili  x   /  AST  17  /  ALT  17  /  AlkPhos  144[H]  12-17      A/P: 84yMale POD#1 s/p left total hip replacement with Dr. Echeverria  - Afebrile, labs and vitals reviewed  - Appreciate medicine recs  - Pain Control  - OOB with meals/encourage IS  - Continue with bowel regimen  - DVT ppx: pradaxa 150mg oral q 12hrs ,aspirin 81mg oral daily  - PT, WBS: WBAT no active abduction of left hip, no abduction pillow  - Dispo: pending further PT eval- patient refused PT twice today    Ortho Pager [phone]

## 2024-12-18 NOTE — PROGRESS NOTE ADULT - SUBJECTIVE AND OBJECTIVE BOX
Ortho Post Op Check    Procedure: L ALFA  Surgeon: Dr. Echeverria    Pt comfortable without complaints, pain controlled  Denies CP, SOB, N/V, numbness/tingling     Vital Signs Last 24 Hrs  T(C): 36.3 (12-18-24 @ 00:05), Max: 36.4 (12-17-24 @ 21:48)  T(F): 97.4 (12-18-24 @ 00:05), Max: 97.6 (12-17-24 @ 21:48)  HR: 82 (12-18-24 @ 00:05) (79 - 82)  BP: 145/86 (12-18-24 @ 00:05) (140/84 - 145/86)  BP(mean): --  RR: 18 (12-18-24 @ 00:05) (17 - 18)  SpO2: 100% (12-18-24 @ 00:05) (99% - 100%)  I&O's Summary      General: Pt Alert and oriented, NAD  DSG C/D/I prineo  Pulses: 2+ DP  Sensation: SILT  Motor: EHL/FHL/TA/GS firing                          10.8   10.75 )-----------( 596      ( 17 Dec 2024 11:26 )             35.3     12-17    138  |  101  |  32[H]  ----------------------------<  132[H]  4.1   |  26  |  0.73    Ca    9.2      17 Dec 2024 11:26    TPro  8.2  /  Alb  3.3  /  TBili  0.4  /  DBili  x   /  AST  17  /  ALT  17  /  AlkPhos  144[H]  12-17      Post-op X-Ray:    A/P: 84yMale POD#0 s/p L Alfa  - Stable  - Pain Control  - DVT ppx: Medicine- confirm resuming home anti-coags is sufficient for post op DVT prophylaxis  - Post op abx: Ancef  - PT, WBS:  WBAT with walker at all times, no active abduction of left hip, no abduction pillow  Dispo: Pending PT    Ortho Pager [phone]

## 2024-12-18 NOTE — CONSULT NOTE ADULT - SUBJECTIVE AND OBJECTIVE BOX
HPI:  84yoM with left hip pain x 6 months without inciting accident or injury. Patient endorses pain with movement. Denies prior surgeries to the hip or injection. He takes Tylenol at home for pain with some relief of symptoms. Pain persists despite conservative measures. Ambulates via wheelchair, occasionally uses a walker.    CAD s/p stent, + hx of CVA, afib on pradaxa and baby aspirin, Last dose pradaxa:   Patient HAS NOT been using opioid pain medications on a regular basis for more than 90 days prior to the date of surgery.    Presents today for elective left total hip replacement with Dr. Echeverria  (16 Dec 2024)      UROLOGY ADDENDUM  The patient is a 84y year old Male with a PMHx of BPH s/p TURP, and Prostate Cancer diagnosed 2 years ago s/p RT and ADT, who presented for left hip replacement, and is now POD#1. He was unable to void overnight, with bladder scan showing 600 ccs. Straight caths were attempted, with resistance felt.     PMHx - Renal artery stenosis, Afib, HTN, Hepatitis, DM, Intermittent claudication, CVA, Osteoarthritis  PSHx - tonsillectomy, femoral stent, b/l cataracts  Meds - Pradaxa, Aspirin, Amiodarone, Metoprolol, Amlodipine, Metformin, Lisinopril  Allergies: Statin    MEDICATIONS  (STANDING):  acetaminophen     Tablet .. 1000 milliGRAM(s) Oral every 8 hours  aMIOdarone    Tablet 100 milliGRAM(s) Oral daily  amLODIPine   Tablet 5 milliGRAM(s) Oral daily  aspirin enteric coated 81 milliGRAM(s) Oral daily  dabigatran 150 milliGRAM(s) Oral every 12 hours  dextrose 5%. 1000 milliLiter(s) (50 mL/Hr) IV Continuous <Continuous>  dextrose 5%. 1000 milliLiter(s) (100 mL/Hr) IV Continuous <Continuous>  dextrose 50% Injectable 25 Gram(s) IV Push once  dextrose 50% Injectable 12.5 Gram(s) IV Push once  dextrose 50% Injectable 25 Gram(s) IV Push once  famotidine    Tablet 20 milliGRAM(s) Oral daily  glucagon  Injectable 1 milliGRAM(s) IntraMuscular once  HYDROmorphone  Injectable 0.5 milliGRAM(s) IV Push once  insulin lispro (ADMELOG) corrective regimen sliding scale   SubCutaneous Before meals and at bedtime  lactated ringers. 1000 milliLiter(s) (75 mL/Hr) IV Continuous <Continuous>  lisinopril 20 milliGRAM(s) Oral daily  metoprolol succinate ER 25 milliGRAM(s) Oral daily  multivitamin 1 Tablet(s) Oral daily  polyethylene glycol 3350 17 Gram(s) Oral at bedtime  senna 2 Tablet(s) Oral at bedtime    MEDICATIONS  (PRN):  dextrose Oral Gel 15 Gram(s) Oral once PRN Blood Glucose LESS THAN 70 milliGRAM(s)/deciliter  magnesium hydroxide Suspension 30 milliLiter(s) Oral daily PRN Constipation  ondansetron Injectable 4 milliGRAM(s) IV Push every 6 hours PRN Nausea and/or Vomiting  traMADol 50 milliGRAM(s) Oral every 6 hours PRN Mild Pain (1 - 3)    Vital Signs Last 24 Hrs  T(C): 36.4 (18 Dec 2024 05:30), Max: 36.4 (17 Dec 2024 21:48)  T(F): 97.6 (18 Dec 2024 05:30), Max: 97.6 (17 Dec 2024 21:48)  HR: 85 (18 Dec 2024 05:30) (64 - 85)  BP: 165/74 (18 Dec 2024 05:30) (113/70 - 177/89)  BP(mean): 112 (17 Dec 2024 20:58) (87 - 121)  RR: 19 (18 Dec 2024 05:30) (10 - 19)  SpO2: 97% (18 Dec 2024 05:30) (97% - 100%)    Parameters below as of 18 Dec 2024 05:30  Patient On (Oxygen Delivery Method): room air        PHYSICAL EXAM:   General: Patient is doing well and lying in bed comfortably  Constitutional: alert and oriented   Pulm: Nonlabored breathing, no respiratory distress  CV: Regular rate and rhythm, normal sinus rhythm  Abd:  soft, nontender, nondistended. No rebound, no guarding  : circumcised, normal meatus, 14 Fr Coude in place  Extremities: warm, well perfused, no edema    LABS:                        10.8   10.75 )-----------( 596      ( 17 Dec 2024 11:26 )             35.3     12-17    138  |  101  |  32[H]  ----------------------------<  132[H]  4.1   |  26  |  0.73    Ca    9.2      17 Dec 2024 11:26    TPro  8.2  /  Alb  3.3  /  TBili  0.4  /  DBili  x   /  AST  17  /  ALT  17  /  AlkPhos  144[H]  12-17    PT/INR - ( 17 Dec 2024 11:26 )   PT: 14.7 sec;   INR: 1.28          PTT - ( 17 Dec 2024 11:26 )  PTT:39.0 sec  Urinalysis Basic - ( 17 Dec 2024 11:26 )    Color: x / Appearance: x / SG: x / pH: x  Gluc: 132 mg/dL / Ketone: x  / Bili: x / Urobili: x   Blood: x / Protein: x / Nitrite: x   Leuk Esterase: x / RBC: x / WBC x   Sq Epi: x / Non Sq Epi: x / Bacteria: x        A/P  The patient is a 84y year old Male with a PMHx of Pr Cx, BPH, who is now POD#1 s/p L Hip Replacement, who was in urinary retention with 600 ccs on a bladder scan.    Lee catheter  Ancef 1 gr Q8 for 24 hr  f/u outpatient with Urologist Dr. Singh for TOV  rest of care per primary team

## 2024-12-19 LAB
ANION GAP SERPL CALC-SCNC: 15 MMOL/L — SIGNIFICANT CHANGE UP (ref 5–17)
BUN SERPL-MCNC: 28 MG/DL — HIGH (ref 7–23)
CALCIUM SERPL-MCNC: 8.7 MG/DL — SIGNIFICANT CHANGE UP (ref 8.4–10.5)
CHLORIDE SERPL-SCNC: 102 MMOL/L — SIGNIFICANT CHANGE UP (ref 96–108)
CO2 SERPL-SCNC: 20 MMOL/L — LOW (ref 22–31)
CREAT SERPL-MCNC: 0.93 MG/DL — SIGNIFICANT CHANGE UP (ref 0.5–1.3)
EGFR: 80 ML/MIN/1.73M2 — SIGNIFICANT CHANGE UP
GLUCOSE SERPL-MCNC: 114 MG/DL — HIGH (ref 70–99)
HCT VFR BLD CALC: 32.9 % — LOW (ref 39–50)
HGB BLD-MCNC: 9.8 G/DL — LOW (ref 13–17)
MCHC RBC-ENTMCNC: 28.7 PG — SIGNIFICANT CHANGE UP (ref 27–34)
MCHC RBC-ENTMCNC: 29.8 G/DL — LOW (ref 32–36)
MCV RBC AUTO: 96.2 FL — SIGNIFICANT CHANGE UP (ref 80–100)
NRBC # BLD: 0 /100 WBCS — SIGNIFICANT CHANGE UP (ref 0–0)
PLATELET # BLD AUTO: 529 K/UL — HIGH (ref 150–400)
POTASSIUM SERPL-MCNC: 4.9 MMOL/L — SIGNIFICANT CHANGE UP (ref 3.5–5.3)
POTASSIUM SERPL-SCNC: 4.9 MMOL/L — SIGNIFICANT CHANGE UP (ref 3.5–5.3)
RBC # BLD: 3.42 M/UL — LOW (ref 4.2–5.8)
RBC # FLD: 14.9 % — HIGH (ref 10.3–14.5)
SODIUM SERPL-SCNC: 137 MMOL/L — SIGNIFICANT CHANGE UP (ref 135–145)
WBC # BLD: 12.85 K/UL — HIGH (ref 3.8–10.5)
WBC # FLD AUTO: 12.85 K/UL — HIGH (ref 3.8–10.5)

## 2024-12-19 PROCEDURE — 99233 SBSQ HOSP IP/OBS HIGH 50: CPT

## 2024-12-19 RX ADMIN — Medication 1 TABLET(S): at 13:36

## 2024-12-19 RX ADMIN — ACETAMINOPHEN 1000 MILLIGRAM(S): 80 SOLUTION/ DROPS ORAL at 05:47

## 2024-12-19 RX ADMIN — TRAMADOL HYDROCHLORIDE 50 MILLIGRAM(S): 50 TABLET ORAL at 08:18

## 2024-12-19 RX ADMIN — AMIODARONE HYDROCHLORIDE 100 MILLIGRAM(S): 200 TABLET ORAL at 05:46

## 2024-12-19 RX ADMIN — DABIGATRAN ETEXILATE 150 MILLIGRAM(S): 110 CAPSULE ORAL at 05:47

## 2024-12-19 RX ADMIN — Medication 5 MILLIGRAM(S): at 06:55

## 2024-12-19 RX ADMIN — Medication 17 GRAM(S): at 21:17

## 2024-12-19 RX ADMIN — FAMOTIDINE 20 MILLIGRAM(S): 20 TABLET, FILM COATED ORAL at 13:36

## 2024-12-19 RX ADMIN — DABIGATRAN ETEXILATE 150 MILLIGRAM(S): 110 CAPSULE ORAL at 19:49

## 2024-12-19 RX ADMIN — Medication 2: at 11:45

## 2024-12-19 RX ADMIN — Medication 2: at 17:40

## 2024-12-19 RX ADMIN — TRAMADOL HYDROCHLORIDE 50 MILLIGRAM(S): 50 TABLET ORAL at 07:22

## 2024-12-19 RX ADMIN — LISINOPRIL 20 MILLIGRAM(S): 30 TABLET ORAL at 05:47

## 2024-12-19 RX ADMIN — Medication 25 MILLIGRAM(S): at 05:47

## 2024-12-19 RX ADMIN — Medication 81 MILLIGRAM(S): at 13:36

## 2024-12-19 RX ADMIN — SENNOSIDES 2 TABLET(S): 8.6 TABLET, FILM COATED ORAL at 21:16

## 2024-12-19 RX ADMIN — ACETAMINOPHEN 1000 MILLIGRAM(S): 80 SOLUTION/ DROPS ORAL at 13:36

## 2024-12-19 NOTE — OCCUPATIONAL THERAPY INITIAL EVALUATION ADULT - ORIENTATION, REHAB EVAL
Pt required cues for place, month and situation, able to state name/ and year spontaneously/person/time

## 2024-12-19 NOTE — OCCUPATIONAL THERAPY INITIAL EVALUATION ADULT - GENERAL OBSERVATIONS, REHAB EVAL
Pt received semi-supine in bed +heplock +tele +SCDs +juarez +L hip incision c/d/i +HHA at bedside, in NAD and agreeable to OT. PT Brigido present t/o. Cleared by MIMI Laird to see pt.

## 2024-12-19 NOTE — PROGRESS NOTE ADULT - SUBJECTIVE AND OBJECTIVE BOX
Patient is a 84y old  Male who presents with a chief complaint of left hip pain (18 Dec 2024 17:24).    Patient seen and examined today. He denies significant left hip pain. He denies chest pain, dyspnea, dizziness, bleeding symptoms, vomiting, abdominal pain.     Allergies    statins (Hepatotoxicity)    Last Bowel Movement: 17-Dec-2024 (12-18-24 @ 20:45)    MEDICATIONS  (STANDING):  acetaminophen     Tablet .. 1000 milliGRAM(s) Oral every 8 hours  aMIOdarone    Tablet 100 milliGRAM(s) Oral daily  amLODIPine   Tablet 5 milliGRAM(s) Oral daily  aspirin enteric coated 81 milliGRAM(s) Oral daily  dabigatran 150 milliGRAM(s) Oral every 12 hours  dextrose 5%. 1000 milliLiter(s) (50 mL/Hr) IV Continuous <Continuous>  dextrose 5%. 1000 milliLiter(s) (100 mL/Hr) IV Continuous <Continuous>  dextrose 50% Injectable 25 Gram(s) IV Push once  dextrose 50% Injectable 12.5 Gram(s) IV Push once  dextrose 50% Injectable 25 Gram(s) IV Push once  famotidine    Tablet 20 milliGRAM(s) Oral daily  glucagon  Injectable 1 milliGRAM(s) IntraMuscular once  HYDROmorphone  Injectable 0.5 milliGRAM(s) IV Push once  insulin lispro (ADMELOG) corrective regimen sliding scale   SubCutaneous Before meals and at bedtime  lactated ringers. 1000 milliLiter(s) (75 mL/Hr) IV Continuous <Continuous>  lisinopril 20 milliGRAM(s) Oral daily  metoprolol succinate ER 25 milliGRAM(s) Oral daily  multivitamin 1 Tablet(s) Oral daily  polyethylene glycol 3350 17 Gram(s) Oral at bedtime  senna 2 Tablet(s) Oral at bedtime    MEDICATIONS  (PRN):  dextrose Oral Gel 15 Gram(s) Oral once PRN Blood Glucose LESS THAN 70 milliGRAM(s)/deciliter  magnesium hydroxide Suspension 30 milliLiter(s) Oral daily PRN Constipation  ondansetron Injectable 4 milliGRAM(s) IV Push every 6 hours PRN Nausea and/or Vomiting  traMADol 50 milliGRAM(s) Oral every 6 hours PRN Mild Pain (1 - 3)    Vital Signs Last 24 Hrs  T(C): 36.7 (12-19-24 @ 05:40), Max: 37.1 (12-18-24 @ 17:42)  T(F): 98.1 (12-19-24 @ 05:40), Max: 98.7 (12-18-24 @ 17:42)  HR: 72 (12-19-24 @ 05:40) (61 - 72)  BP: 129/67 (12-19-24 @ 05:40) (104/68 - 132/66)  BP(mean): --  RR: 18 (12-19-24 @ 05:40) (18 - 18)  SpO2: 96% (12-19-24 @ 05:40) (94% - 97%)      I&O's Summary    18 Dec 2024 07:01  -  19 Dec 2024 07:00  --------------------------------------------------------  IN: 675 mL / OUT: 1200 mL / NET: -525 mL      Oxygen Saturation Index= Unable to calculate   [Based on FiO2 = Unknown, SpO2 = 96(12/19/2024 05:40), MAP = Unknown]    PHYSICAL EXAM:  GENERAL: NAD  HEAD:  Atraumatic, Normocephalic  EYES: EOMI, conjunctiva and sclera clear  ENMT: Moist mucous membranes  NECK: Supple, No JVD  NERVOUS SYSTEM:  Alert & Oriented X3, Good concentration  CHEST/LUNG: Clear to auscultation bilaterally  HEART: Irregularly irregular rhythm  ABDOMEN: Soft, Nontender, Nondistended; Bowel sounds present  EXTREMITIES:  left hip dressing c/d/i; 2+ Peripheral Pulses  PSYCH: Normal mood and affect    Consultant(s) Notes Reviewed:  [x ] YES  [ ] NO  Care Discussed with Consultants/Other Providers [ x] YES  [ ] NO    Investigations:                        9.8    12.85 )-----------( 529      ( 19 Dec 2024 08:05 )             32.9     12-19    137  |  102  |  28[H]  ----------------------------<  114[H]  4.9   |  20[L]  |  0.93    Ca    8.7      19 Dec 2024 08:05

## 2024-12-19 NOTE — OCCUPATIONAL THERAPY INITIAL EVALUATION ADULT - DIAGNOSIS, OT EVAL
Pt s/p L YO on 12/17. Pt presents with impaired cognition, decreased strength, ROM, coordination, balance, impacting ability to perform ADL and mobility.

## 2024-12-19 NOTE — OCCUPATIONAL THERAPY INITIAL EVALUATION ADULT - PERTINENT HX OF CURRENT PROBLEM, REHAB EVAL
84/M with CAD, Atrial fibrillation on Pradaxa, CVA (?), DM type 2, HTN and osteoarthritis who presented with chronic left hip pain x 6 months that failed conservative management and admitted for an elective YO by Dr. Echeverria. Pt s/p L YO on 12/17.

## 2024-12-19 NOTE — OCCUPATIONAL THERAPY INITIAL EVALUATION ADULT - BED MOBILITY/TRANSFERS, PREVIOUS LEVEL OF FUNCTION, OT EVAL
----- Message from Jerzy Roca MD sent at 6/25/2024  4:21 PM CDT -----  Please call the patient regarding the following results:    A1c is 11.1% - diabetes is severely uncontrolled (blood glucose levels are averaging over 300). This puts her at high risk of a stroke, heart attack or kidney failure.     To combat this, I am starting a low dose insulin (LANTUS) to be taken at night for her at this time to get her sugars back under control. This is to be taken in addition the the OZEMPIC that she is starting once weekly.     She needs to check her blood sugars at least twice a day at this time (goal fasting or premeal blood glucose is less than 150). I sent a prescription for a glucometer and diabetic supplies.     I will also refer her to our Diabetic Educator to discuss insulin and additional diabetic management.    Schedule lab follow-up: Yes - 3 months   Schedule appointment: yes (after labs - virtual or in-person)  
Spoke with patient and she stated that it was a lot of test results for her to digest at one time. Patient asked that I send her the test results through patient portal which was done.   
needed assist/needs device

## 2024-12-19 NOTE — OCCUPATIONAL THERAPY INITIAL EVALUATION ADULT - IMPAIRED TRANSFERS: SIT/STAND, REHAB EVAL
impaired balance/impaired coordination/decreased flexibility/impaired motor control/pain/impaired postural control/decreased strength

## 2024-12-19 NOTE — PROGRESS NOTE ADULT - ASSESSMENT
Mr. Zuhair Mark is an 84/M with CAD, Atrial fibrillation on Pradaxa, CVA (?), DM type 2, HTN and osteoarthritis who presented with chronic left hip pain x 6 months that failed conservative management and admitted for an elective YO by Dr. Echeverria.    CAPILLARY BLOOD GLUCOSE  POCT Blood Glucose.: 112 mg/dL (19 Dec 2024 07:27)  POCT Blood Glucose.: 178 mg/dL (18 Dec 2024 21:48)  POCT Blood Glucose.: 115 mg/dL (18 Dec 2024 17:21)  POCT Blood Glucose.: 112 mg/dL (18 Dec 2024 12:46)    Recommendations:    #Left hip osteoarthritis   #Post op state  - pain controlled  - Provide adequate analgesia, Incentive spirometry, mobilize with fall precautions, bowel regimen, DVT prophylaxis  - PT/OT    #Acute blood loss anemia  - Monitor Hgb  - Transfuse for Hgb < 7  - Ensure Type and Screen available  - can send iron, TIBC, ferritin, B12, retic    #Urinary retention  - Lee catheter in place  - urology following  - ensure adequate bowel regimen in place    #Leucocytosis  - likely reactive  - afebrile and denies symptoms of infection  - monitor temp and WBC trend    #CAD  #Atrial fibrillation  - rate controlled on Metoprolol, Amiodarone  - on Pradaxa  - chest pain free    #HTN  - on Metoprolol, Amlodipine and Lisinopril  - ensure BP parameters in place  - monitor K and Cr while on Lisinopril     #DM type 2  - A1C 7, controlled  - on correctional insulin; goal glucose 100-180 mg/dL  - can resume Metformin if no contraindication from orthopedics team  - DM diet    DVT ppx: Dabigatran    Feel free to reach out for any questions. Recommendations discussed with primary team.

## 2024-12-19 NOTE — OCCUPATIONAL THERAPY INITIAL EVALUATION ADULT - MODIFIED CLINICAL TEST OF SENSORY INTEGRATION IN BALANCE TEST
Pt performed 4 trials of STS with maxAx2 +RW, pt unable to fully extend knees/hips or correct kyphotic posture despite max verbal and physical cues.

## 2024-12-19 NOTE — OCCUPATIONAL THERAPY INITIAL EVALUATION ADULT - ADDITIONAL COMMENTS
Pt lives in an apt alone with no TIFFANIE, pt has a 24/7 HHA. Pt was able to walk short distances with a RW however further distances pt uses WC.

## 2024-12-19 NOTE — PROGRESS NOTE ADULT - SUBJECTIVE AND OBJECTIVE BOX
Ortho Note    Pt seen during morning rounds while laying comfortable without complaints in bed. Pt states pain is controlled and that the is feeling well today. Pt states that he is tolerating his diet well and passing flatus, but denies a BM. Pt aid at the bedside and concerned that the patient is coughing when he is eating.  Denies CP, SOB, HA, dysphagia, N/V, numbness/tingling     Vital Signs Last 24 Hrs  T(C): 36.4 (12-19-24 @ 13:35), Max: 36.4 (12-19-24 @ 13:35)  T(F): 97.5 (12-19-24 @ 13:35), Max: 97.5 (12-19-24 @ 13:35)  HR: 67 (12-19-24 @ 13:35) (67 - 67)  BP: 130/71 (12-19-24 @ 13:35) (130/71 - 130/71)  BP(mean): --  RR: 18 (12-19-24 @ 13:35) (18 - 18)  SpO2: 98% (12-19-24 @ 13:35) (98% - 98%)  I&O's Summary    18 Dec 2024 07:01  -  19 Dec 2024 07:00  --------------------------------------------------------  IN: 675 mL / OUT: 1200 mL / NET: -525 mL        General: Pt Alert and oriented, NAD  DSG C/D/I - Prineo   Pulses: 2+ DP. Skin warm, dry, well perfused b/l  Sensation: SILT L3-S1 b/l  Motor: EHL/FHL/TA/GS b/l                          9.8    12.85 )-----------( 529      ( 19 Dec 2024 08:05 )             32.9     12-19    137  |  102  |  28[H]  ----------------------------<  114[H]  4.9   |  20[L]  |  0.93    Ca    8.7      19 Dec 2024 08:05        A/P: 85 y/o Male POD#2 s/p L YO with Dr. Echeverria  - Ayesha. Continue to appreciate medicine recommendations  - Pain Control  - Continue to monitor swallowing, if continues, consider speech and swallow consult  - DVT ppx: ASA, SCDs  - PT, WBS: WBAT  - EHOB for meals. IS  - Bowel regimen  - Dispo: [facility]      Ortho Pager [phone] Ortho Note    Pt seen during morning rounds while laying comfortable without complaints in bed. Pt states pain is controlled and that the is feeling well today. Pt states that he is tolerating his diet well and passing flatus, but denies a BM. Pt aid at the bedside and concerned that the patient is coughing when he is eating.  Denies CP, SOB, HA, dysphagia, N/V, numbness/tingling     Vital Signs Last 24 Hrs  T(C): 36.4 (12-19-24 @ 13:35), Max: 36.4 (12-19-24 @ 13:35)  T(F): 97.5 (12-19-24 @ 13:35), Max: 97.5 (12-19-24 @ 13:35)  HR: 67 (12-19-24 @ 13:35) (67 - 67)  BP: 130/71 (12-19-24 @ 13:35) (130/71 - 130/71)  BP(mean): --  RR: 18 (12-19-24 @ 13:35) (18 - 18)  SpO2: 98% (12-19-24 @ 13:35) (98% - 98%)  I&O's Summary    18 Dec 2024 07:01  -  19 Dec 2024 07:00  --------------------------------------------------------  IN: 675 mL / OUT: 1200 mL / NET: -525 mL        General: Pt Alert and oriented, NAD  DSG C/D/I - Prineo   Pulses: 2+ DP. Skin warm, dry, well perfused b/l  Sensation: SILT L3-S1 b/l  Motor: EHL/FHL/TA/GS b/l                          9.8    12.85 )-----------( 529      ( 19 Dec 2024 08:05 )             32.9     12-19    137  |  102  |  28[H]  ----------------------------<  114[H]  4.9   |  20[L]  |  0.93    Ca    8.7      19 Dec 2024 08:05        A/P: 85 y/o Male POD#2 s/p L YO with Dr. Echeverria  - Ayesha. Continue to appreciate medicine recommendations  - Pain Control  - Continue to monitor swallowing, if continues, consider speech and swallow consult  - DVT ppx: ASA, Pradaxa, SCDs  - PT, WBS: WBAT  - EHOB for meals. IS  - Bowel regimen  - Dispo: [facility]      Ortho Pager [phone]

## 2024-12-19 NOTE — PROGRESS NOTE ADULT - SUBJECTIVE AND OBJECTIVE BOX
Ortho Note    Pt seen during morning rounds while laying comfortable without complaints in bed. Pt states pain is controlled and that the is feeling well today. Pt states that he is tolerating his diet well and passing flatus, but denies a BM. Pt aid at the bedside and concerned that the patient is coughing when he is eating.  Denies CP, SOB, HA, dysphagia, N/V, numbness/tingling     Vital Signs Last 24 Hrs  T(C): 36.4 (12-19-24 @ 13:35), Max: 36.4 (12-19-24 @ 13:35)  T(F): 97.5 (12-19-24 @ 13:35), Max: 97.5 (12-19-24 @ 13:35)  HR: 67 (12-19-24 @ 13:35) (67 - 67)  BP: 130/71 (12-19-24 @ 13:35) (130/71 - 130/71)  BP(mean): --  RR: 18 (12-19-24 @ 13:35) (18 - 18)  SpO2: 98% (12-19-24 @ 13:35) (98% - 98%)  I&O's Summary    18 Dec 2024 07:01  -  19 Dec 2024 07:00  --------------------------------------------------------  IN: 675 mL / OUT: 1200 mL / NET: -525 mL        General: Pt Alert and oriented, NAD  DSG C/D/I - Prineo   Pulses: 2+ DP. Skin warm, dry, well perfused b/l  Sensation: SILT L3-S1 b/l  Motor: EHL/FHL/TA/GS b/l                          9.8    12.85 )-----------( 529      ( 19 Dec 2024 08:05 )             32.9     12-19    137  |  102  |  28[H]  ----------------------------<  114[H]  4.9   |  20[L]  |  0.93    Ca    8.7      19 Dec 2024 08:05        A/P: 85 y/o Male POD#2 s/p L YO with Dr. Echeverria  - Ayesha. Continue to appreciate medicine recommendations  - Pain Control  - Continue to monitor swallowing, if continues, consider speech and swallow consult  - DVT ppx: ASA, Pradaxa, SCDs  - PT, WBS: WBAT  - EHOB for meals. IS  - Bowel regimen  - Dispo: [facility]      Ortho Pager [phone]

## 2024-12-20 LAB
ANION GAP SERPL CALC-SCNC: 9 MMOL/L — SIGNIFICANT CHANGE UP (ref 5–17)
BUN SERPL-MCNC: 25 MG/DL — HIGH (ref 7–23)
CALCIUM SERPL-MCNC: 8.5 MG/DL — SIGNIFICANT CHANGE UP (ref 8.4–10.5)
CHLORIDE SERPL-SCNC: 105 MMOL/L — SIGNIFICANT CHANGE UP (ref 96–108)
CO2 SERPL-SCNC: 23 MMOL/L — SIGNIFICANT CHANGE UP (ref 22–31)
CREAT SERPL-MCNC: 0.81 MG/DL — SIGNIFICANT CHANGE UP (ref 0.5–1.3)
EGFR: 86 ML/MIN/1.73M2 — SIGNIFICANT CHANGE UP
GLUCOSE SERPL-MCNC: 105 MG/DL — HIGH (ref 70–99)
HCT VFR BLD CALC: 32.2 % — LOW (ref 39–50)
HGB BLD-MCNC: 9.5 G/DL — LOW (ref 13–17)
MCHC RBC-ENTMCNC: 27.7 PG — SIGNIFICANT CHANGE UP (ref 27–34)
MCHC RBC-ENTMCNC: 29.5 G/DL — LOW (ref 32–36)
MCV RBC AUTO: 93.9 FL — SIGNIFICANT CHANGE UP (ref 80–100)
NRBC # BLD: 0 /100 WBCS — SIGNIFICANT CHANGE UP (ref 0–0)
PLATELET # BLD AUTO: 489 K/UL — HIGH (ref 150–400)
POTASSIUM SERPL-MCNC: 4.4 MMOL/L — SIGNIFICANT CHANGE UP (ref 3.5–5.3)
POTASSIUM SERPL-SCNC: 4.4 MMOL/L — SIGNIFICANT CHANGE UP (ref 3.5–5.3)
RBC # BLD: 3.43 M/UL — LOW (ref 4.2–5.8)
RBC # FLD: 14.8 % — HIGH (ref 10.3–14.5)
SODIUM SERPL-SCNC: 137 MMOL/L — SIGNIFICANT CHANGE UP (ref 135–145)
WBC # BLD: 10.57 K/UL — HIGH (ref 3.8–10.5)
WBC # FLD AUTO: 10.57 K/UL — HIGH (ref 3.8–10.5)

## 2024-12-20 PROCEDURE — 99233 SBSQ HOSP IP/OBS HIGH 50: CPT

## 2024-12-20 RX ADMIN — ACETAMINOPHEN 1000 MILLIGRAM(S): 80 SOLUTION/ DROPS ORAL at 06:48

## 2024-12-20 RX ADMIN — DABIGATRAN ETEXILATE 150 MILLIGRAM(S): 110 CAPSULE ORAL at 06:49

## 2024-12-20 RX ADMIN — Medication 1 TABLET(S): at 12:42

## 2024-12-20 RX ADMIN — Medication 2: at 12:48

## 2024-12-20 RX ADMIN — FAMOTIDINE 20 MILLIGRAM(S): 20 TABLET, FILM COATED ORAL at 12:42

## 2024-12-20 RX ADMIN — Medication 25 MILLIGRAM(S): at 06:49

## 2024-12-20 RX ADMIN — AMIODARONE HYDROCHLORIDE 100 MILLIGRAM(S): 200 TABLET ORAL at 06:49

## 2024-12-20 RX ADMIN — Medication 81 MILLIGRAM(S): at 12:41

## 2024-12-20 RX ADMIN — ACETAMINOPHEN 1000 MILLIGRAM(S): 80 SOLUTION/ DROPS ORAL at 01:51

## 2024-12-20 RX ADMIN — ACETAMINOPHEN 1000 MILLIGRAM(S): 80 SOLUTION/ DROPS ORAL at 13:34

## 2024-12-20 RX ADMIN — LISINOPRIL 20 MILLIGRAM(S): 30 TABLET ORAL at 06:49

## 2024-12-20 RX ADMIN — Medication 5 MILLIGRAM(S): at 06:48

## 2024-12-20 RX ADMIN — DABIGATRAN ETEXILATE 150 MILLIGRAM(S): 110 CAPSULE ORAL at 17:42

## 2024-12-20 RX ADMIN — ACETAMINOPHEN 1000 MILLIGRAM(S): 80 SOLUTION/ DROPS ORAL at 23:32

## 2024-12-20 NOTE — DIETITIAN INITIAL EVALUATION ADULT - PROBLEM SELECTOR PLAN 2
h/o, on pradaxa, resume post op with lower dvt ppx dose per Cardiology. Per cards clearance, resume full dose 150mg bid as soon as safe and no longer than 3-5 days.

## 2024-12-20 NOTE — DIETITIAN INITIAL EVALUATION ADULT - PERTINENT MEDS FT
MEDICATIONS  (STANDING):  acetaminophen     Tablet .. 1000 milliGRAM(s) Oral every 8 hours  aMIOdarone    Tablet 100 milliGRAM(s) Oral daily  amLODIPine   Tablet 5 milliGRAM(s) Oral daily  aspirin enteric coated 81 milliGRAM(s) Oral daily  dabigatran 150 milliGRAM(s) Oral every 12 hours  dextrose 5%. 1000 milliLiter(s) (50 mL/Hr) IV Continuous <Continuous>  dextrose 5%. 1000 milliLiter(s) (100 mL/Hr) IV Continuous <Continuous>  dextrose 50% Injectable 25 Gram(s) IV Push once  dextrose 50% Injectable 12.5 Gram(s) IV Push once  dextrose 50% Injectable 25 Gram(s) IV Push once  famotidine    Tablet 20 milliGRAM(s) Oral daily  glucagon  Injectable 1 milliGRAM(s) IntraMuscular once  HYDROmorphone  Injectable 0.5 milliGRAM(s) IV Push once  insulin lispro (ADMELOG) corrective regimen sliding scale   SubCutaneous Before meals and at bedtime  lactated ringers. 1000 milliLiter(s) (75 mL/Hr) IV Continuous <Continuous>  lisinopril 20 milliGRAM(s) Oral daily  metoprolol succinate ER 25 milliGRAM(s) Oral daily  multivitamin 1 Tablet(s) Oral daily  polyethylene glycol 3350 17 Gram(s) Oral at bedtime  senna 2 Tablet(s) Oral at bedtime    MEDICATIONS  (PRN):  dextrose Oral Gel 15 Gram(s) Oral once PRN Blood Glucose LESS THAN 70 milliGRAM(s)/deciliter  magnesium hydroxide Suspension 30 milliLiter(s) Oral daily PRN Constipation  ondansetron Injectable 4 milliGRAM(s) IV Push every 6 hours PRN Nausea and/or Vomiting  traMADol 50 milliGRAM(s) Oral every 6 hours PRN Mild Pain (1 - 3)

## 2024-12-20 NOTE — PROGRESS NOTE ADULT - SUBJECTIVE AND OBJECTIVE BOX
HPI:  84yoM with left hip pain x 6 months without inciting accident or injury. Patient endorses pain with movement. Denies prior surgeries to the hip or injection. He takes Tylenol at home for pain with some relief of symptoms. Pain persists despite conservative measures. Ambulates via wheelchair, occasionally uses a walker.    CAD s/p stent, + hx of CVA, afib on pradaxa and baby aspirin, Last dose pradaxa:   Patient HAS NOT been using opioid pain medications on a regular basis for more than 90 days prior to the date of surgery.    Presents today for elective left total hip replacement with Dr. Echeverria  (16 Dec 2024 12:49)    Ethics:  Ethics consult initiated and case discussed with social work.    In the event patient lacks capacity, given the HCP form was invalid, the [facility] Family Health Care Decision Act (CDA) should be followed. As such, patient's niece, Jennifer, is the appropriate surrogate decision maker.    Ethics spoke with patient's niece and she is available and willing to assist in decision making. Of note, while patient has a sister who is in higher standing on the [facility] FHCDA, the sister is currently in a rehab facility and unavailable to assist.    Full consult to follow.    Shruti Abarca MS, [facility]  Ethics Attending  [phone]

## 2024-12-20 NOTE — DIETITIAN INITIAL EVALUATION ADULT - NSFNSGIIOFT_GEN_A_CORE
I&O's Detail    19 Dec 2024 07:01  -  20 Dec 2024 07:00  --------------------------------------------------------  IN:  Total IN: 0 mL    OUT:    Indwelling Catheter - Urethral (mL): 1025 mL  Total OUT: 1025 mL    Total NET: -1025 mL      20 Dec 2024 07:01  -  20 Dec 2024 14:52  --------------------------------------------------------  IN:  Total IN: 0 mL    OUT:    Indwelling Catheter - Urethral (mL): 100 mL  Total OUT: 100 mL    Total NET: -100 mL

## 2024-12-20 NOTE — DISCHARGE NOTE PROVIDER - CARE PROVIDERS DIRECT ADDRESSES
[email or web address]

## 2024-12-20 NOTE — DIETITIAN INITIAL EVALUATION ADULT - PERTINENT LABORATORY DATA
12-20    137  |  105  |  25[H]  ----------------------------<  105[H]  4.4   |  23  |  0.81    Ca    8.5      20 Dec 2024 05:30    POCT Blood Glucose.: 161 mg/dL (12-20-24 @ 12:31)  A1C with Estimated Average Glucose Result: 7.0 % (12-18-24 @ 10:00)

## 2024-12-20 NOTE — DISCHARGE NOTE PROVIDER - PROVIDER TOKENS
PROVIDER:[TOKEN:[[id number]],FOLLOWUP:[2 weeks]] PROVIDER:[TOKEN:[[id number]],FOLLOWUP:[2 weeks]],PROVIDER:[TOKEN:[[id number]],FOLLOWUP:[1 week]]

## 2024-12-20 NOTE — DISCHARGE NOTE PROVIDER - CARE PROVIDER_API CALL
Oscar Echeverria  Joint Reconstruction  [address]  [place] [address]  Phone: [phone]  Fax: [phone]  Follow Up Time: 2 weeks   Oscar Echeverria  Joint Reconstruction  [address]  [place] [address]  Phone: [phone]  Fax: [phone]  Follow Up Time: 2 weeks    Geovani Singh  Urology  [address] [address]  Phone: [phone]  Fax: [phone]  Follow Up Time: 1 week

## 2024-12-20 NOTE — DIETITIAN INITIAL EVALUATION ADULT - ETIOLOGY
related to decreased ability to meet increased nutrient needs due to decreased PO intake associated with coughing after eating status post surgery

## 2024-12-20 NOTE — DIETITIAN INITIAL EVALUATION ADULT - OTHER CALCULATIONS
Based on dosing wt 141 pounds as pt within % ideal body weight (92%). Needs adjusted for post-op healing, advanced age.

## 2024-12-20 NOTE — DISCHARGE NOTE PROVIDER - HOSPITAL COURSE
Admit to Orthopaedics  Surgery left total hip replacement on 12/17  Perioperative Antibiotics  DVT prophylaxis  Optimized by Physical Therapy/ Occupational Therapy  Pain Regimen given  Medicine Comanagement Admit to Orthopaedics  Surgery left total hip replacement on 12/17  Perioperative Antibiotics  DVT prophylaxis  Optimized by Physical Therapy/ Occupational Therapy  Pain Regimen given  Medicine Comanagement    Urology: You were seen by urology for urinary retention. a juarez catheter was placed and will remain in place after discharge. You will need to follow up with Dr. Singh outpatient to have juarez removed and to have a trial of void. Please call to make an appointment. Contact info is included. 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 a juarez catheter was placed and will remain in place after discharge. You will need to follow up with Dr. Singh outpatient to have juarez removed and to have a trial of void. Please call to make an appointment. Contact info is included.    12/30: TTE done  12/31: TTE reviewed by medicine. Severe aortic stenosis. Structural heart consulted recommending: _______. Speech and swallow consulted, recommending FEES tomorrow. 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 a juarez catheter was placed and will remain in place after discharge. You will need to follow up with Dr. Singh outpatient to have juarez removed and to have a trial of void. Please call to make an appointment. Contact info is included.    Cardiology / Structural Cardiology. You were seen by cardiology and structural heart for aortic stenosis. Follow up with your cardiologist and structural cardiology upon discharge. Call to schedule an appointment, contact information is included.    12/30: TTE done  12/31: TTE reviewed by medicine. Severe aortic stenosis. Structural heart consulted recommending: _______. Speech and swallow consulted, recommending FEES tomorrow. 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 a juarez catheter was placed and will remain in place after discharge. You will need to follow up with Dr. Singh outpatient to have juarez removed and to have a trial of void. Please call to make an appointment. Contact info is included.    Cardiology / Structural Cardiology. You were seen by cardiology and structural heart for aortic stenosis. Follow up with your cardiologist and structural cardiology upon discharge. Call to schedule an appointment, contact information is included.    12/30: TTE done  12/31: TTE reviewed by medicine. Severe aortic stenosis. Structural heart consulted recommending: _______. Speech and swallow consulted, recommending FEES tomorrow.   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 You will need to follow up with Dr. Singh outpatient as needed, for any follow up urinary issues. Please call to make an appointment. Contact info is included.    Cardiology / Structural Cardiology. You were seen by cardiology and structural heart for aortic stenosis. Follow up with your cardiologist and structural cardiology upon discharge. Call to schedule an appointment, contact information is included.    12/30: TTE done  12/31: TTE reviewed by medicine. Severe aortic stenosis. 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 Structural heart consulted for possible intervention on aortic stenosis. Will likely need rehabilitation prior to any TAVR intervention. Patient should follow up as an outpatient. Please give patient information at discharge to the outpatient office.    Speech and swallow consulted, recommending FEES tomorrow.   Patient seen by speech and swallow recommending the following:  Continue regular solids and thin liquids  allow for swallow between intakes; alternate food with liquid; check mouth frequently for oral residue/pocketing; maintain upright posture during/after eating for 30 mins; oral hygiene; position upright (90 degrees); small sips/bites  set up only required

## 2024-12-20 NOTE — CONSULT NOTE ADULT - SUBJECTIVE AND OBJECTIVE BOX
Consult requested by: Hannah Barkley 		Role: ROSA     			Service: Medicine  Attending: Oscar Echeverria MD  Consultants: Shruti Abarca, MS, [facility]- (Medical Ethicist) and Helga Garcia, MSN, RN, [facility], [facility] (Ethics Fellow)  Contact #s: [phone] (c)  [phone] (o)  CONSULT PURPOSE:  To assist the healthcare team in an ethical dilemma of an 84-year-old male admitted for hip replacement whose capacity is unclear.    CLINICAL SUMMARY: This is an 84-year-old male with a past medical history of coronary artery disease (status post stent), cerebral vascular accident, atrial fibrillation (on Pradaza and baby aspirin), benign prostatic hyperplasia (status post transurethral resection of the prostate), and prostate cancer (status post radiation and androgen deprivation therapy). Patient was admitted for elective left total hip replacement given left hip pain x6 months without incident or injury. Orthopedics following. On 12/18, patient was unable to void overnight, with bladder scan showing 600 ccs. Urology on consult and juarez catheter placed. Currently, patient is post-op day three. Ethics consult initiated to facilitate care planning for a patient whose capacity is unclear.        Prognosis Estimate (survival in days, wks, mos, yrs):	 Guarded  Patient Decision-Making Capacity: Has capacity (see Discussions)	   	  Patient Aware of:  Diagnosis:  Yes    Prognosis:  Yes       Name of medical decision-maker should patient lack capacity: Jennifer (see Discussions)        Relationship: Niece  Role:  Legal Surrogate   Contact #(s): [phone]  OTHER STAKEHOLDERS: Froylan Gomez  Evidence of Patient’s Preference of Life-Sustaining Treatment (Written or Oral): None				                 Resuscitation status:  DNR:  No      DNI:  No        Discussions:   Discussion with ROSA Calixto on 12/19/24 (16:00 – 16:15): Discussed patient’s case. On 12/19, patient appeared to lack capacity for medical decision making. Patient’s private aide presented a health care proxy (HCP) form, but it was completed incorrectly. Ethics advised that as the HCP form was completed incorrectly, it is invalid. In addition, a patient cannot appoint their health aide as a proxy as it is a conflict of interest. Given patient does not currently have capacity to fill out a new HCP form, [facility] Patient has a niece, Jenniefr. Ethics to contact Jennifer to inquire if patient has other family. Ethics consult initiated to determine appropriate surrogate decision maker for patient.  Discussion with Froylan Patricia, Patient’s Caregiver on 12/20/24 (10:00 – 10:20): Ethics introduced self and spoke with Ms. Gomez. Ms. Gomez describes herself as patient’s . Ethics explain the invalidation of the HCP form, which Ms. Gomez understood. She stated that patient has a niece, Jennifer, who she speaks to about decisions for Mr. Mark. Ms. Gomez provided contact number for Jennifer and Ethics informed that we will contact Jennifer and be in touch regarding surrogacy.  Discussion with Jennifer, Patient’s Niece on 12/20/24 (11:00 – 11:15): Ethics introduced self and role. Jennifer discussed that patient has a sister, but she is currently in rehab and so not available to act as surrogate decision maker. Jennifer stated that she is available and willing to act as surrogate decision maker for patient should he lack capacity. She will likely be visiting patient in hospital today (12/20).  Discussion with Zuhair Santiagoy (Patient), Froylan Gomez (Caregiver), Cristina (Patient’s Friend), and YOSEF Garcia, MSN, RN (Ethics Fellow) on 12/20/24 (11:40 – 11:50): Introduced self and role of Ethics. Mr. Mark is alert and oriented x 4. When explored, Mr. Mark expressed understanding of his current situation (“Here for a hip repair) and what his care plan entails (“Hoping to go to [place] for rehab”). Cristina shared that Mr. Mark appears well. Froylan added that Mr. Mark is more alert today than yesterday.     BIOETHICS ANALYSIS:  The central ethical issue that exists in this case is (A) the patient’s autonomy versus (B) the providers’ desire for beneficence and non-maleficence. Autonomy centers on letting patients with capacity decide what is best for their health after being fully informed of the options available, as well as the risks and benefits of those options. Non-maleficence focuses on the clinician’s desire to do no harm. Similarly, beneficence focuses on the clinicians’ desire to do what is best for their patient, requiring that health care providers consider what is best for the patient given the individual patient’s unique circumstances.      Essential to the notion of autonomy is that of capacity, the ability to comprehend, understand, appreciate and reason (C-U-A-R). Crucial to capacity is the ability to communicate an understanding of the various care options and an appreciation of the risks and benefits of those options. In this case, upon discussion with Mr. Mark, he is able to communicate, understand, appreciate, and reason the various discharge options as well as his reasons for being in the hospital. As such, Mr. Mark presently demonstrates capacity to participate in care planning. Of note, capacity is decision specific and can wax and wane over time. In that regard, capacity is an ongoing process, not a single event or assessment, and it involves a willingness on the part of the practitioners to reassess capacity over time.     Mr. Mark has innate moral status – that is his distinct personhood, personal experiences, gayle tradition, and interpretation of suffering, life-story, etc.—which must be respected. While there may be external factors that could influence Mr. Mark’s capacity for autonomous choice (including legal, economic, or personal elements), they do not seem to have a coercive nature that might impair decision making at this time. If these elements were to acquire a coercive nature that might impair decision making, an additional capacity assessment should follow (as capacity is not a single event, but a continuing appraisal of the ability for autonomous choice).     Beneficence extends to respecting the patient’s autonomy while non-maleficence ensures that harm is imposed on the patient from care decisions. Here, the plan for discharge to rehab provides the patient with a safe discharge option and is in alignment with patient’s autonomous decision to go to rehab.  Consult requested by: Hannah Barkley 		Role: ROSA     			Service: Medicine  Attending: Oscar Echeverria MD  Consultants: Shruti Abarca, MS, [facility]- (Medical Ethicist) and Helga Garcia, MSN, RN, [facility], [facility] (Ethics Fellow)  Contact #s: [phone] (c)  [phone] (o)    CONSULT PURPOSE:  To assist the healthcare team in an ethical dilemma of an 84-year-old male admitted for hip replacement whose capacity is unclear.    CLINICAL SUMMARY: This is an 84-year-old male with a past medical history of coronary artery disease (status post stent), cerebral vascular accident, atrial fibrillation (on Pradaza and baby aspirin), benign prostatic hyperplasia (status post transurethral resection of the prostate), and prostate cancer (status post radiation and androgen deprivation therapy). Patient was admitted for elective left total hip replacement given left hip pain x6 months without incident or injury. Orthopedics following. On 12/18, patient was unable to void overnight, with bladder scan showing 600 ccs. Urology on consult and juarez catheter placed. Currently, patient is post-op day three. Ethics consult initiated to facilitate care planning for a patient whose capacity is unclear.        Prognosis Estimate (survival in days, wks, mos, yrs):	 Guarded  Patient Decision-Making Capacity: Has capacity (see Discussions)	   	  Patient Aware of:  Diagnosis:  Yes    Prognosis:  Yes       Name of medical decision-maker should patient lack capacity: Jennifer (see Discussions)        Relationship: Niece  Role:  Legal Surrogate   Contact #(s): [phone]  OTHER STAKEHOLDERS: Froylan Gomez  Evidence of Patient’s Preference of Life-Sustaining Treatment (Written or Oral): None				                 Resuscitation status:  DNR:  No      DNI:  No        Discussions:     Discussion with ROSA Calixto on 12/19/24 (16:00 – 16:15): Discussed patient’s case. On 12/19, patient appeared to lack capacity for medical decision making. Patient’s private aide presented a health care proxy (HCP) form, but it was completed incorrectly. Ethics advised that as the HCP form was completed incorrectly, it is invalid. In addition, a patient cannot appoint their health aide as a proxy as it is a conflict of interest. Given patient does not currently have capacity to fill out a new HCP form, [facility] Patient has a niece, Jennifer. Ethics to contact Jennifer to inquire if patient has other family. Ethics consult initiated to determine appropriate surrogate decision maker for patient.    Discussion with Froylan Patricia, Patient’s Caregiver on 12/20/24 (10:00 – 10:20): Ethics introduced self and spoke with Ms. Gomez. Ms. Gomez describes herself as patient’s . Ethics explain the invalidation of the HCP form, which Ms. Gomez understood. She stated that patient has a niece, Jennifer, who she speaks to about decisions for Mr. Mark. Ms. Gomez provided contact number for Jennifer and Ethics informed that we will contact Jennifer and be in touch regarding surrogacy.    Discussion with Jennifer, Patient’s Niece on 12/20/24 (11:00 – 11:15): Ethics introduced self and role. Jennifer discussed that patient has a sister, but she is currently in rehab and so not available to act as surrogate decision maker. Jennifer stated that she is available and willing to act as surrogate decision maker for patient should he lack capacity. She will likely be visiting patient in hospital today (12/20).    Discussion with Zuhair Santiagoy (Patient), Froylan Gomez (Caregiver), Cristina (Patient’s Friend), and YOSEF Garcia, MSN, RN (Ethics Fellow) on 12/20/24 (11:40 – 11:50): Introduced self and role of Ethics. Mr. Mark is alert and oriented x 4. When explored, Mr. Mark expressed understanding of his current situation (“Here for a hip repair) and what his care plan entails (“Hoping to go to [place] for rehab”). Cristina shared that Mr. Mark appears well. Froylan added that Mr. Mark is more alert today than yesterday.     BIOETHICS ANALYSIS:  The central ethical issue that exists in this case is (A) the patient’s autonomy versus (B) the providers’ desire for beneficence and non-maleficence. Autonomy centers on letting patients with capacity decide what is best for their health after being fully informed of the options available, as well as the risks and benefits of those options. Non-maleficence focuses on the clinician’s desire to do no harm. Similarly, beneficence focuses on the clinicians’ desire to do what is best for their patient, requiring that health care providers consider what is best for the patient given the individual patient’s unique circumstances.      Essential to the notion of autonomy is that of capacity, the ability to comprehend, understand, appreciate and reason (C-U-A-R). Crucial to capacity is the ability to communicate an understanding of the various care options and an appreciation of the risks and benefits of those options. In this case, upon discussion with Mr. Mark, he is able to communicate, understand, appreciate, and reason the various discharge options as well as his reasons for being in the hospital. As such, Mr. Mark presently demonstrates capacity to participate in care planning. Of note, capacity is decision specific and can wax and wane over time. In that regard, capacity is an ongoing process, not a single event or assessment, and it involves a willingness on the part of the practitioners to reassess capacity over time.     Mr. Mark has innate moral status – that is his distinct personhood, personal experiences, gayle tradition, and interpretation of suffering, life-story, etc.—which must be respected. While there may be external factors that could influence Mr. Mark’s capacity for autonomous choice (including legal, economic, or personal elements), they do not seem to have a coercive nature that might impair decision making at this time. If these elements were to acquire a coercive nature that might impair decision making, an additional capacity assessment should follow (as capacity is not a single event, but a continuing appraisal of the ability for autonomous choice).     Beneficence extends to respecting the patient’s autonomy while non-maleficence ensures that harm is imposed on the patient from care decisions. Here, the plan for discharge to rehab provides the patient with a safe discharge option and is in alignment with patient’s autonomous decision to go to rehab.  Consult requested by: Hannah Barkley 		Role: ROSA     			Service: Medicine  Attending: Oscar Echeverria MD  Consultants: Shruti Abarca, MS, [facility]- (Medical Ethicist) and Helga Garcia, MSN, RN, [facility], [facility] (Ethics Fellow)  Contact #s: [phone] (c)  [phone] (o)    CONSULT PURPOSE:  To assist the healthcare team in an ethical dilemma of an 84-year-old male admitted for hip replacement whose capacity is unclear.    CLINICAL SUMMARY: This is an 84-year-old male with a past medical history of coronary artery disease (status post stent), cerebral vascular accident, atrial fibrillation (on Pradaza and baby aspirin), benign prostatic hyperplasia (status post transurethral resection of the prostate), and prostate cancer (status post radiation and androgen deprivation therapy). Patient was admitted for elective left total hip replacement given left hip pain x6 months without incident or injury. Orthopedics following. On 12/18, patient was unable to void overnight, with bladder scan showing 600 ccs. Urology on consult and juarez catheter placed. Currently, patient is post-op day three. Ethics consult initiated to facilitate care planning for a patient whose capacity is unclear.        Prognosis Estimate (survival in days, wks, mos, yrs):	 Guarded  Patient Decision-Making Capacity: Has capacity (see Discussions)	   	  Patient Aware of:  Diagnosis:  Yes    Prognosis:  Yes       Name of medical decision-maker should patient lack capacity: Jennifer (see Discussions)        Relationship: Niece  Role:  Legal Surrogate   Contact #(s): [phone]  OTHER STAKEHOLDERS: Froylan Gomez  Evidence of Patient’s Preference of Life-Sustaining Treatment (Written or Oral): None				                 Resuscitation status:  DNR:  No      DNI:  No        Discussions:     Discussion with ROSA Calixto on 12/19/24 (16:00 – 16:15): Discussed patient’s case. On 12/19, patient appeared to lack capacity for medical decision making. Patient’s private aide presented a health care proxy (HCP) form, but it was completed incorrectly. Ethics advised that as the HCP form was completed incorrectly, it is invalid. In addition, a patient cannot appoint their health aide as a proxy as it is a conflict of interest. Given patient does not currently have capacity to fill out a new HCP form, [facility]. Patient has a niece, Jennifer. Ethics to contact Jennifer to inquire if patient has other family. Ethics consult initiated to determine appropriate surrogate decision maker for patient.    Discussion with Froylan Patricia, Patient’s Caregiver on 12/20/24 (10:00 – 10:20): Ethics introduced self and spoke with Ms. Gomez. Ms. Gomez describes herself as patient’s . Ethics explained the invalidation of the HCP form, which Ms. Gomez understood. She stated that patient has a niece, Jennifer, who she speaks to about decisions for Mr. Mark. Ms. Gomez provided contact number for Jennifer and Ethics informed that we will contact Jennifer and be in touch regarding surrogacy.    Discussion with Jennifer, Patient’s Niece on 12/20/24 (11:00 – 11:15): Ethics introduced self and role. Jennifer discussed that patient has a sister, but she is currently in rehab and so not available to act as surrogate decision maker. Jennifer stated that she is available and willing to act as surrogate decision maker for patient should he lack capacity. She will likely be visiting patient in hospital today (12/20).    Discussion with Zuhair Santiagoy (Patient), Froylan Gomez (Caregiver), Cristina (Patient’s Friend), and YOSEF Garcia, MSN, RN (Ethics Fellow) on 12/20/24 (11:40 – 11:50): Introduced self and role of Ethics. Mr. Mark is alert and oriented x 4. When explored, Mr. Mark expressed understanding of his current situation (“Here for a hip repair) and what his care plan entails (“Hoping to go to [place] for rehab”). Cristina shared that Mr. Mark appears well. Froylan added that Mr. Mark is more alert today than yesterday.     BIOETHICS ANALYSIS:  The central ethical issue that exists in this case is (A) the patient’s autonomy versus (B) the providers’ desire for beneficence and non-maleficence. Autonomy centers on letting patients with capacity decide what is best for their health after being fully informed of the options available, as well as the risks and benefits of those options. Non-maleficence focuses on the clinician’s desire to do no harm. Similarly, beneficence focuses on the clinicians’ desire to do what is best for their patient, requiring that health care providers consider what is best for the patient given the individual patient’s unique circumstances.      Essential to the notion of autonomy is that of capacity, the ability to comprehend, understand, appreciate and reason (C-U-A-R). Crucial to capacity is the ability to communicate an understanding of the various care options and an appreciation of the risks and benefits of those options. In this case, upon discussion with Mr. Mark, he is able to communicate, understand, appreciate, and reason the various discharge options as well as his reasons for being in the hospital. As such, Mr. Mark presently demonstrates capacity to participate in care planning. Of note, capacity is decision specific and can wax and wane over time. In that regard, capacity is an ongoing process, not a single event or assessment, and it involves a willingness on the part of the practitioners to reassess capacity over time.     Mr. Mark has innate moral status – that is his distinct personhood, personal experiences, gayle tradition, and interpretation of suffering, life-story, etc.—which must be respected. While there may be external factors that could influence Mr. Mark’s capacity for autonomous choice (including legal, economic, or personal elements), they do not seem to have a coercive nature that might impair decision making at this time. If these elements were to acquire a coercive nature that might impair decision making, an additional capacity assessment should follow (as capacity is not a single event, but a continuing appraisal of the ability for autonomous choice).     Beneficence extends to respecting the patient’s autonomy while non-maleficence ensures that harm is imposed on the patient from care decisions. Here, the plan for discharge to rehab provides the patient with a safe discharge option and is in alignment with patient’s autonomous decision to go to rehab.

## 2024-12-20 NOTE — PROGRESS NOTE ADULT - SUBJECTIVE AND OBJECTIVE BOX
Ortho Note    Pt seen and examined during AM rounds.   Pt comfortable without complaints, pain controlled with regimen.   He reports having some more energy today.  He is tolerating PO intake.   Denies CP, SOB, N/V, numbness/tingling     Vital Signs Last 24 Hrs  T(C): 36.3 (12-20-24 @ 12:36), Max: 36.6 (12-20-24 @ 09:15)  T(F): 97.3 (12-20-24 @ 12:36), Max: 97.8 (12-20-24 @ 09:15)  HR: 75 (12-20-24 @ 12:36) (71 - 75)  BP: 111/66 (12-20-24 @ 12:36) (111/66 - 125/73)  BP(mean): --  RR: 18 (12-20-24 @ 12:36) (18 - 18)  SpO2: 97% (12-20-24 @ 12:36) (96% - 97%)  I&O's Summary    19 Dec 2024 07:01  -  20 Dec 2024 07:00  --------------------------------------------------------  IN: 0 mL / OUT: 1025 mL / NET: -1025 mL    PE  General: Pt Alert and oriented, NAD  DSG prineo C/D/I  Lee in place   Pulses: +2 DP  Sensation: intact to light touch distal extremities   Motor: EHL/FHL/TA/GS                          9.5    10.57 )-----------( 489      ( 20 Dec 2024 05:30 )             32.2     12-20    137  |  105  |  25[H]  ----------------------------<  105[H]  4.4   |  23  |  0.81    Ca    8.5      20 Dec 2024 05:30        A/P: 84yMale POD#3 s/p Left total hip replacement    - Stable  - Pain Control  - Appreciate medicine recommendations   - DVT ppx: aspirin 81mg QD, pradaxa, scds  - PT, WBS: wbat with walker at all times, no active abduction of left hip, no abduction pillow  - Bowel regimen  DISPO; LUCRECIA    Ortho Pager [phone]

## 2024-12-20 NOTE — DISCHARGE NOTE PROVIDER - NSDCFUSCHEDAPPT_GEN_ALL_CORE_FT
Yuliya Ferraro  [facility]  CARDIOLOGY [address] [facility]   Scheduled Appointment: 01/28/2025     [facility]  ORTHOSURG [address]  Scheduled Appointment: 01/13/2025    Yuliya Ferraro  [facility]  CARDIOLOGY [address] [facility] [address] [facility]   Scheduled Appointment: 01/16/2025    Yuliya Ferraro  [facility]  CARDIOLOGY [address] [facility]   Scheduled Appointment: 01/28/2025

## 2024-12-20 NOTE — PROGRESS NOTE ADULT - SUBJECTIVE AND OBJECTIVE BOX
Ortho Note    Pt seen during morning rounds while laying comfortable without complaints in bed. Pt states pain is controlled and that the is feeling well today.   Denies CP, SOB, HA, dysphagia, N/V, numbness/tingling     Vital Signs Last 24 Hrs  T(C): 36.4 (12-19-24 @ 13:35), Max: 36.4 (12-19-24 @ 13:35)  T(F): 97.5 (12-19-24 @ 13:35), Max: 97.5 (12-19-24 @ 13:35)  HR: 67 (12-19-24 @ 13:35) (67 - 67)  BP: 130/71 (12-19-24 @ 13:35) (130/71 - 130/71)  BP(mean): --  RR: 18 (12-19-24 @ 13:35) (18 - 18)  SpO2: 98% (12-19-24 @ 13:35) (98% - 98%)  I&O's Summary    18 Dec 2024 07:01  -  19 Dec 2024 07:00  --------------------------------------------------------  IN: 675 mL / OUT: 1200 mL / NET: -525 mL        General: Pt Alert and oriented, NAD  DSG C/D/I - Prineo   Pulses: 2+ DP. Skin warm, dry, well perfused b/l  Sensation: SILT L3-S1 b/l  Motor: EHL/FHL/TA/GS b/l                          9.8    12.85 )-----------( 529      ( 19 Dec 2024 08:05 )             32.9     12-19    137  |  102  |  28[H]  ----------------------------<  114[H]  4.9   |  20[L]  |  0.93    Ca    8.7      19 Dec 2024 08:05        A/P: 85 y/o Male POD#3 s/p L YO with Dr. Jacki Hodge. Continue to appreciate medicine recommendations  - Pain Control  - Continue to monitor swallowing, if continues, consider speech and swallow consult  - DVT ppx: ASA, Pradaxa, SCDs  - PT, WBS: WBAT  - EHOB for meals. IS  - Bowel regimen  - Dispo: LUCRECIA      Ortho Pager [phone]

## 2024-12-20 NOTE — PROGRESS NOTE ADULT - ASSESSMENT
Mr. Zuhair Mark is an 84/M with CAD, Atrial fibrillation on Pradaxa, CVA (?), DM type 2, HTN and osteoarthritis who presented with chronic left hip pain x 6 months that failed conservative management and admitted for an elective YO by Dr. Echeverria.    CAPILLARY BLOOD GLUCOSE  POCT Blood Glucose.: 105 mg/dL (20 Dec 2024 07:29)  POCT Blood Glucose.: 136 mg/dL (19 Dec 2024 21:39)  POCT Blood Glucose.: 169 mg/dL (19 Dec 2024 17:15)    Recommendations:    #Left hip osteoarthritis   #Post op state  - pain controlled  - Provide adequate analgesia, Incentive spirometry, mobilize with fall precautions, bowel regimen, DVT prophylaxis  - PT/OT    #Acute blood loss anemia  - Monitor Hgb; no reported bleeding symptoms   - Transfuse for Hgb < 7  - Ensure Type and Screen available  - can send iron, TIBC, ferritin, B12, retic    #Urinary retention  - Lee catheter in place  - urology following  - ensure adequate bowel regimen in place    #Leucocytosis, improving  - likely reactive  - afebrile and denies symptoms of infection  - monitor temp and WBC trend    #CAD  #Atrial fibrillation  - rate controlled on Metoprolol, Amiodarone  - on Pradaxa  - chest pain free    #HTN  - on Metoprolol, Amlodipine and Lisinopril  - ensure BP parameters in place  - monitor K and Cr while on Lisinopril     #DM type 2  - A1C 7, controlled  - on correctional insulin; goal glucose 100-180 mg/dL  - can resume Metformin if no contraindication from orthopedics team  - DM diet    DVT ppx: Dabigatran    Feel free to reach out for any questions. Recommendations discussed with primary team.

## 2024-12-20 NOTE — PROGRESS NOTE ADULT - SUBJECTIVE AND OBJECTIVE BOX
Patient is a 84y old  Male who presents with a chief complaint of left hip pain (20 Dec 2024 11:17).    Patient seen and examined today. He feels well and reports mild left hip pain with movement. He is hoping to work with PT this morning,    Allergies    statins (Hepatotoxicity)    Last Bowel Movement: 17-Dec-2024 (12-20-24 @ 09:15)    MEDICATIONS  (STANDING):  acetaminophen     Tablet .. 1000 milliGRAM(s) Oral every 8 hours  aMIOdarone    Tablet 100 milliGRAM(s) Oral daily  amLODIPine   Tablet 5 milliGRAM(s) Oral daily  aspirin enteric coated 81 milliGRAM(s) Oral daily  dabigatran 150 milliGRAM(s) Oral every 12 hours  dextrose 5%. 1000 milliLiter(s) (50 mL/Hr) IV Continuous <Continuous>  dextrose 5%. 1000 milliLiter(s) (100 mL/Hr) IV Continuous <Continuous>  dextrose 50% Injectable 25 Gram(s) IV Push once  dextrose 50% Injectable 12.5 Gram(s) IV Push once  dextrose 50% Injectable 25 Gram(s) IV Push once  famotidine    Tablet 20 milliGRAM(s) Oral daily  glucagon  Injectable 1 milliGRAM(s) IntraMuscular once  HYDROmorphone  Injectable 0.5 milliGRAM(s) IV Push once  insulin lispro (ADMELOG) corrective regimen sliding scale   SubCutaneous Before meals and at bedtime  lactated ringers. 1000 milliLiter(s) (75 mL/Hr) IV Continuous <Continuous>  lisinopril 20 milliGRAM(s) Oral daily  metoprolol succinate ER 25 milliGRAM(s) Oral daily  multivitamin 1 Tablet(s) Oral daily  polyethylene glycol 3350 17 Gram(s) Oral at bedtime  senna 2 Tablet(s) Oral at bedtime    MEDICATIONS  (PRN):  dextrose Oral Gel 15 Gram(s) Oral once PRN Blood Glucose LESS THAN 70 milliGRAM(s)/deciliter  magnesium hydroxide Suspension 30 milliLiter(s) Oral daily PRN Constipation  ondansetron Injectable 4 milliGRAM(s) IV Push every 6 hours PRN Nausea and/or Vomiting  traMADol 50 milliGRAM(s) Oral every 6 hours PRN Mild Pain (1 - 3)      Vital Signs Last 24 Hrs  T(C): 36.6 (12-20-24 @ 09:15), Max: 36.8 (12-19-24 @ 20:30)  T(F): 97.8 (12-20-24 @ 09:15), Max: 98.3 (12-20-24 @ 00:17)  HR: 71 (12-20-24 @ 09:15) (63 - 90)  BP: 125/73 (12-20-24 @ 09:15) (118/58 - 155/81)  BP(mean): --  RR: 18 (12-20-24 @ 09:15) (18 - 18)  SpO2: 96% (12-20-24 @ 09:15) (95% - 98%)      I&O's Summary    19 Dec 2024 07:01  -  20 Dec 2024 07:00  --------------------------------------------------------  IN: 0 mL / OUT: 1025 mL / NET: -1025 mL      Oxygen Saturation Index= Unable to calculate   [Based on FiO2 = Unknown, SpO2 = 96(12/20/2024 09:15), MAP = Unknown]    PHYSICAL EXAM:  GENERAL: NAD  HEAD:  Atraumatic, Normocephalic  EYES: EOMI, conjunctiva and sclera clear  ENMT: Moist mucous membranes  NECK: Supple, No JVD  NERVOUS SYSTEM:  Alert & Oriented X3, Moves extremities  CHEST/LUNG: Clear to auscultation bilaterally  HEART: Irregularly irregular rhythm  ABDOMEN: Soft, Nontender, Nondistended; Bowel sounds present  EXTREMITIES:  left hip dressing c/d/i; 2+ Peripheral Pulses  PSYCH: Normal mood and affect    Consultant(s) Notes Reviewed:  [x ] YES  [ ] NO  Care Discussed with Consultants/Other Providers [ x] YES  [ ] NO    Investigations:                        9.5    10.57 )-----------( 489      ( 20 Dec 2024 05:30 )             32.2     12-20    137  |  105  |  25[H]  ----------------------------<  105[H]  4.4   |  23  |  0.81    Ca    8.5      20 Dec 2024 05:30

## 2024-12-20 NOTE — DIETITIAN INITIAL EVALUATION ADULT - NS FNS DIET ORDER
Diet, Consistent Carbohydrate/No Snacks:   Supplement Feeding Modality:  Oral  Ensure Max Cans or Servings Per Day:  1       Frequency:  Three Times a day (12-20-24 @ 13:26)

## 2024-12-20 NOTE — DISCHARGE NOTE PROVIDER - NSDCMRMEDTOKEN_GEN_ALL_CORE_FT
amiodarone 100 mg oral tablet:  orally once a day  amLODIPine 5 mg oral tablet: 1 tab(s) orally once a day  Aspirin Enteric Coated 81 mg oral delayed release tablet: 1 tab(s) orally once a day MDD:1 tab  lisinopril 20 mg oral tablet: 1 tab(s) orally once a day  metFORMIN 850 mg oral tablet: 1 tab(s) orally once a day (in the morning) Do not restart until Sat 7/30  metoprolol: 20 milligram(s) orally once a day  Pradaxa 150 mg oral capsule: 1 cap(s) orally 2 times a day   acetaminophen 500 mg oral tablet: 2 tab(s) orally every 8 hours  amiodarone 100 mg oral tablet:  orally once a day  amLODIPine 5 mg oral tablet: 1 tab(s) orally once a day  Aspirin Enteric Coated 81 mg oral delayed release tablet: 1 tab(s) orally once a day MDD:1 tab  lisinopril 20 mg oral tablet: 1 tab(s) orally once a day  metFORMIN 850 mg oral tablet: 1 tab(s) orally once a day (in the morning) Do not restart until Sat 7/30  metoprolol: 20 milligram(s) orally once a day  Pradaxa 150 mg oral capsule: 1 cap(s) orally 2 times a day  traMADol 50 mg oral tablet: 1 tab(s) orally every 6 hours As needed Mild Pain (1 - 3)   acetaminophen 500 mg oral tablet: 2 tab(s) orally every 8 hours  amiodarone 100 mg oral tablet:  orally once a day  amLODIPine 5 mg oral tablet: 1 tab(s) orally once a day  Aspirin Enteric Coated 81 mg oral delayed release tablet: 1 tab(s) orally once a day MDD:1 tab  lisinopril 20 mg oral tablet: 1 tab(s) orally once a day  metFORMIN 850 mg oral tablet: 1 tab(s) orally once a day (in the morning) Do not restart until Sat 7/30  metoprolol: 20 milligram(s) orally once a day  Pradaxa 150 mg oral capsule: 1 cap(s) orally 2 times a day  traMADol 25 mg oral tablet: 1 tab(s) orally every 4 hours as needed for  moderate pain MDD: 6   acetaminophen 500 mg oral tablet: 2 tab(s) orally every 8 hours  amiodarone 100 mg oral tablet:  orally once a day  amLODIPine 5 mg oral tablet: 1 tab(s) orally once a day  Aspirin Enteric Coated 81 mg oral delayed release tablet: 1 tab(s) orally once a day MDD:1 tab  lisinopril 20 mg oral tablet: 1 tab(s) orally once a day  metFORMIN 850 mg oral tablet: 1 tab(s) orally once a day (in the morning) Do not restart until Sat 7/30  metoprolol: 20 milligram(s) orally once a day  Pradaxa 150 mg oral capsule: 1 cap(s) orally 2 times a day  traMADol 50 mg oral tablet: 1 tab(s) orally every 6 hours as needed for  severe pain MDD: 4   acetaminophen 500 mg oral tablet: 2 tab(s) orally every 8 hours  amiodarone 100 mg oral tablet:  orally once a day  amLODIPine 5 mg oral tablet: 1 tab(s) orally once a day  Aspirin Enteric Coated 81 mg oral delayed release tablet: 1 tab(s) orally once a day MDD:1 tab  lisinopril 20 mg oral tablet: 1 tab(s) orally once a day  metFORMIN 850 mg oral tablet: 1 tab(s) orally once a day (in the morning) Do not restart until Sat 7/30  metoprolol: 20 milligram(s) orally once a day  Nebulizer Kit: Nebulizer Kit- MDD: 1  Pradaxa 150 mg oral capsule: 1 cap(s) orally 2 times a day  traMADol 50 mg oral tablet: 1 tab(s) orally every 6 hours as needed for  severe pain MDD: 4   acetaminophen 500 mg oral tablet: 2 tab(s) orally every 8 hours as needed for  mild pain  amiodarone 100 mg oral tablet:  orally once a day  amLODIPine 5 mg oral tablet: 1 tab(s) orally once a day  Aspirin Enteric Coated 81 mg oral delayed release tablet: 1 tab(s) orally once a day MDD:1 tab  lisinopril 20 mg oral tablet: 1 tab(s) orally once a day  metFORMIN 850 mg oral tablet: 1 tab(s) orally once a day (in the morning) Do not restart until Sat 7/30  metoprolol: 20 milligram(s) orally once a day  Nebulizer Kit: Nebulizer Kit- MDD: 1  polyethylene glycol 3350 oral powder for reconstitution: 17 gram(s) orally 2 times a day  Pradaxa 150 mg oral capsule: 1 cap(s) orally 2 times a day  senna leaf extract oral tablet: 2 tab(s) orally once a day (at bedtime)  tamsulosin 0.4 mg oral capsule: 1 cap(s) orally once a day (at bedtime)  traMADol 50 mg oral tablet: 1 tab(s) orally every 6 hours as needed for  severe pain MDD: 4   acetaminophen 500 mg oral tablet: 2 tab(s) orally every 8 hours as needed for  mild pain  amiodarone 100 mg oral tablet:  orally once a day  amLODIPine 5 mg oral tablet: 1 tab(s) orally once a day  Aspirin Enteric Coated 81 mg oral delayed release tablet: 1 tab(s) orally once a day MDD:1 tab  lisinopril 20 mg oral tablet: 1 tab(s) orally once a day  metFORMIN 850 mg oral tablet: 1 tab(s) orally once a day (in the morning)  metoprolol: 20 milligram(s) orally once a day  polyethylene glycol 3350 oral powder for reconstitution: 17 gram(s) orally 2 times a day  Pradaxa 150 mg oral capsule: 1 cap(s) orally 2 times a day  senna leaf extract oral tablet: 2 tab(s) orally once a day (at bedtime)  tamsulosin 0.4 mg oral capsule: 1 cap(s) orally once a day (at bedtime)  tamsulosin 0.4 mg oral capsule: 1 cap(s) orally once a day (at bedtime)  traMADol 50 mg oral tablet: 1 tab(s) orally every 6 hours as needed for  severe pain MDD: 4   acetaminophen 500 mg oral tablet: 2 tab(s) orally every 8 hours as needed for  mild pain  amiodarone 100 mg oral tablet:  orally once a day  amLODIPine 5 mg oral tablet: 1 tab(s) orally once a day  Aspirin Enteric Coated 81 mg oral delayed release tablet: 1 tab(s) orally once a day MDD:1 tab  lisinopril 20 mg oral tablet: 1 tab(s) orally once a day  metFORMIN 850 mg oral tablet: 1 tab(s) orally once a day (in the morning)  metoprolol succinate 25 mg oral tablet, extended release: 1 tab(s) orally once a day  polyethylene glycol 3350 oral powder for reconstitution: 17 gram(s) orally 2 times a day  Pradaxa 150 mg oral capsule: 1 cap(s) orally 2 times a day  senna leaf extract oral tablet: 2 tab(s) orally once a day (at bedtime)  tamsulosin 0.4 mg oral capsule: 1 cap(s) orally once a day (at bedtime)  traMADol 50 mg oral tablet: 1 tab(s) orally every 6 hours as needed for  severe pain MDD: 4

## 2024-12-20 NOTE — DIETITIAN INITIAL EVALUATION ADULT - OTHER INFO
"84yoM with left hip pain x 6 months without inciting accident or injury. Patient endorses pain with movement. Denies prior surgeries to the hip or injection. He takes Tylenol at home for pain with some relief of symptoms. Pain persists despite conservative measures. Ambulates via wheelchair, occasionally uses a walker."     Patient seen at bedside on 9WO for nutrition assessment. Obtained information from both pt and HHA at bedside. Reports good appetite prior to admission, endorses typically consuming ~5 smaller meals/day and states he follows the Mediterranean diet at home consisting of a diet high in lean protein and fresh fruits/ vegetables, likely meeting >/= 75% estimated nutrient needs. Current diet order: consistent carb. Reports decreased PO intake due to persistent cough after PO intake, observed pt consuming breakfast of scrambled eggs (states he could eat without cough but only ~25% consumed), fruit (~75% consumed), and oatmeal (~25% consumed) upon visit this morning, likely meeting </= 50% estimated nutrient needs at this time. No known food allergies. Reports no difficulty chewing/swallowing prior to admission and states the coughing after eating may be related to throat soreness, ? SLP consult. RD requested to add Ensure Max 3x/day to optimize nutritional needs (provides 150 kcal, 30 g protein/ shake), pt receptive. Dosing weight: 141 pounds, BMI 21.4, reports UBW of 140 pounds, stable x months. No pressure injuries documented, dario 18. No edema documented. Denies nausea/vomiting/diarrhea/constipation, last bowel movement documented 12/17. Labs: POCTs (105, 136, 169, 158 mg/dl...), serum glucose 105 mg/dL( WNL), BUN elevated. Meds: admelog, LR, MVI, miralax, senna, zofran PRN. NFPE revealed moderate clavicle/ shoulder wasting - does not meet criteria for protein-calorie malnutrition as per ASPEN guidelines at this time. No cultural, ethnic, Muslim food preferences reported. See nutrition recommendations below.

## 2024-12-20 NOTE — DISCHARGE NOTE PROVIDER - NSDCFUADDINST_GEN_ALL_CORE_FT
Follow Dr. Echeverria's paper discharge instructions     WEIGHT BEARING:  You may bear full weight as tolerated using a walker, crutches or cane as needed. You may use the walking aid which you were discharged with and switch to a cane whenever you feel comfortable doing so. You should use an assistive device until you can walk comfortably without it. Keep in mind that every patient moves at their own speed of recovery so take your time.  DRESSING:  Please do not shower for the first 4 days after surgery. The ace wrap and cotton padded dressing can come off 4 days after surgery. You can do this yourself. You will see a mesh strip over the incision. Leave that mesh strip on until it peels off very easily or until your first post-op visit. Spots of blood may be visible through the dressing and should not alarm you but call the office if the dressing becomes saturated with blood. If you observe drainage from the incision after removing the ace wrap, please call the office. If the incision is dry and there is no drainage, you may shower once you take the ace wrap off. Do not scrub the incision. Pat dry. There is no need to cover the wound with a new dressing if there is no drainage. You should not immerse the wound in water (bath, swimming, whirlpool) for 2-3 weeks after surgery, or until the wound is completely healed with no scabs or crusts. Do not apply any creams or ointments to your incision.  There are no stitches or staples to be removed.    CARE OF SURGICAL SITE  Apply ice packs to the surgical site and elevate the leg above the level of your heart when you’re at rest to reduce pain and swelling. For icing, twenty-minute sessions followed by an hour off is recommended. You should ice as frequently as possible. Ice should NEVER be placed directly on the skin.    MEDICATIONS  Nonsteroidal anti-inflammatory (NSAID) medication is prescribed to reduce pain and inflammation, unless there is a contraindication. You've been prescribed Celebrex 100mg or Celebrex 200mg twice a day OR Meloxicam 7.5mg or 15mg daily. Take as prescribed, even if your pain is mild. Do not combine Meloxicam or Celebrex with over-the-counter NSAIDs (such as Advil or Aleve) or any other prescribed NSAIDs. If you experience stomach pain or discoloration of your stool, stop the medication, and call your doctor.    Acetaminophen (Tylenol) is prescribed to treat mild to moderate pain and to minimize the amount of narcotic medication required to manage severe pain. You’ve been prescribed Acetaminophen 1,000mg every 6-8 hours for the first few weeks, even when pain is mild, as this will reduce how often you feel the need to take narcotics. This medication is very safe at prescribed doses. Do not exceed 4,000mg of Acetaminophen in a 24-hour period.    Narcotic (opiate) pain medication(s) are prescribed to treat severe pain. You've been prescribed Tramadol 50mg to take every 6 hours as needed. Take these medications only if you are experiencing pain and wean off them rapidly if possible as they can be habit-forming. Call the office if your pain is not well controlled. If you have severe pain and are running low on medication, you may request a refill. Refills can only be handled during regular business hours. Remember to contact the office by 4:00 on Friday if you believe you will need medications over the weekend. Side effects of opioids include nausea, respiratory depression, sedation, and constipation. Take a stool softener (Miralax) if you experience constipation and anti-nausea medication (Zofran/Ondansetron) if you experience nausea. Driving, operating heavy machinery and drinking alcohol are prohibited while taking these medications.    Blood thinners are prescribed to reduce the risk of blood clots forming after surgery. You've been prescribed Aspirin (Ecotrin) 81mg twice a day. Please take the full course as prescribed. If you’ve been prescribed another blood thinner (Eliquis or Xarelto) due to your medical history, please take as directed.    Antacids protect your stomach from acid irritation. You’ve been prescribed Pepcid (Famotidine) 20mg once daily to help your stomach tolerate the combination of aspirin with an NSAID. Please take the full course as prescribed.    You should restart all of your prescription medications once home unless specifically instructed otherwise.  Follow Dr. Echeverria's paper discharge instructions     WEIGHT BEARING:  You may bear full weight as tolerated using a walker, crutches or cane as needed. You may use the walking aid which you were discharged with and switch to a cane whenever you feel comfortable doing so. You should use an assistive device until you can walk comfortably without it. Keep in mind that every patient moves at their own speed of recovery so take your time.  DRESSING:  Please do not shower for the first 4 days after surgery. The ace wrap and cotton padded dressing can come off 4 days after surgery. You can do this yourself. You will see a mesh strip over the incision. Leave that mesh strip on until it peels off very easily or until your first post-op visit. Spots of blood may be visible through the dressing and should not alarm you but call the office if the dressing becomes saturated with blood. If you observe drainage from the incision after removing the ace wrap, please call the office. If the incision is dry and there is no drainage, you may shower once you take the ace wrap off. Do not scrub the incision. Pat dry. There is no need to cover the wound with a new dressing if there is no drainage. You should not immerse the wound in water (bath, swimming, whirlpool) for 2-3 weeks after surgery, or until the wound is completely healed with no scabs or crusts. Do not apply any creams or ointments to your incision.  There are no stitches or staples to be removed.    CARE OF SURGICAL SITE  Apply ice packs to the surgical site and elevate the leg above the level of your heart when you’re at rest to reduce pain and swelling. For icing, twenty-minute sessions followed by an hour off is recommended. You should ice as frequently as possible. Ice should NEVER be placed directly on the skin.    MEDICATIONS  Nonsteroidal anti-inflammatory (NSAID) medication is prescribed to reduce pain and inflammation, unless there is a contraindication. You've been prescribed Celebrex 100mg or Celebrex 200mg twice a day OR Meloxicam 7.5mg or 15mg daily. Take as prescribed, even if your pain is mild. Do not combine Meloxicam or Celebrex with over-the-counter NSAIDs (such as Advil or Aleve) or any other prescribed NSAIDs. If you experience stomach pain or discoloration of your stool, stop the medication, and call your doctor.    Acetaminophen (Tylenol) is prescribed to treat mild to moderate pain and to minimize the amount of narcotic medication required to manage severe pain. You’ve been prescribed Acetaminophen 1,000mg every 6-8 hours for the first few weeks, even when pain is mild, as this will reduce how often you feel the need to take narcotics. This medication is very safe at prescribed doses. Do not exceed 4,000mg of Acetaminophen in a 24-hour period.    Narcotic (opiate) pain medication(s) are prescribed to treat severe pain. You've been prescribed Tramadol 50mg to take every 6 hours as needed. Take these medications only if you are experiencing pain and wean off them rapidly if possible as they can be habit-forming. Call the office if your pain is not well controlled. If you have severe pain and are running low on medication, you may request a refill. Refills can only be handled during regular business hours. Remember to contact the office by 4:00 on Friday if you believe you will need medications over the weekend. Side effects of opioids include nausea, respiratory depression, sedation, and constipation. Take a stool softener (Miralax) if you experience constipation and anti-nausea medication (Zofran/Ondansetron) if you experience nausea. Driving, operating heavy machinery and drinking alcohol are prohibited while taking these medications.    Blood thinners are prescribed to reduce the risk of blood clots forming after surgery. You've been prescribed Aspirin (Ecotrin) 81mg twice a day. Please take the full course as prescribed. If you’ve been prescribed another blood thinner (Eliquis or Xarelto) due to your medical history, please take as directed.    Antacids protect your stomach from acid irritation. You’ve been prescribed Pepcid (Famotidine) 20mg once daily to help your stomach tolerate the combination of aspirin with an NSAID. Please take the full course as prescribed.    You should restart all of your prescription medications once home unless specifically instructed otherwise.     Follow up with your cardiologist Dr. Ferraro outpatient at your scheduled appointment on 1/28/25.   Follow up with structural cardiology outpatient. Call #[phone] to schedule an appointment.

## 2024-12-20 NOTE — CONSULT NOTE ADULT - ASSESSMENT
RECOMMENDATION: At present, Mr. Mark has demonstrated that he has the capacity to participate in care planning. Of note, capacity is decision specific and can wax and wane over time. In that regard, capacity is an ongoing process, not a single event or assessment, and it involves a willingness on the part of the practitioners to reassess capacity over time.     Should Mr. Mark lack capacity, the [facility] Decision Act should be followed and Jennifer, the patient’s niece, would be the appropriate surrogate decision maker for patient.      Ethics will continue to follow.    Thank you for this challenging case. Please do not hesitate to call us if there are any questions.   	  					  CASE DISCUSSED WITH MEDICAL ETHICS ATTENDING: JANNET WILLIAMSON MD,  OF MEDICAL ETHICS  MORE THAN 50% OF THE TIME OF THIS CONSULTATION WAS SPENT IN COORDINATION OF CARE OF PATIENT

## 2024-12-20 NOTE — DIETITIAN INITIAL EVALUATION ADULT - ADD RECOMMEND
1. Continue current diet order, however, if cough persists after consuming food would strongly suggest SLP consult to determine least restricted/ most tolerated diet consistency ****  2. Encourage and monitor PO intake, honor preferences as able   >> Consistently meet >75% of estimated needs during admission   >> Continue with Ensure Max 3x/day to optimize nutritional needs (provides 150 kcal, 30 g protein/ shake)    3. Monitor wt trends, GI function, skin integrity  4. Monitor lytes, renal indices, blood glucose, LFTs    5. Pain and bowel regimen per team  6. Continue with MVI daily to meet 100% RDA needs   RD will continue to monitor PO intake, labs, hydration, and wt prn.

## 2024-12-20 NOTE — DIETITIAN INITIAL EVALUATION ADULT - PERSON TAUGHT/METHOD
RD provided verbal nutrition education on PO encouragement and emphasized softer/ smooth foods for now, however, if coughing persists status post PO intake, would STRONGLY recommend SLP consult./verbal instruction/patient instructed

## 2024-12-21 PROCEDURE — 99233 SBSQ HOSP IP/OBS HIGH 50: CPT

## 2024-12-21 RX ORDER — ACETAMINOPHEN 80 MG/.8ML
2 SOLUTION/ DROPS ORAL
Qty: 0 | Refills: 0 | DISCHARGE
Start: 2024-12-21

## 2024-12-21 RX ORDER — SODIUM CHLORIDE 9 MG/ML
1000 INJECTION, SOLUTION INTRAVENOUS
Refills: 0 | Status: DISCONTINUED | OUTPATIENT
Start: 2024-12-21 | End: 2024-12-30

## 2024-12-21 RX ORDER — TRAMADOL HYDROCHLORIDE 50 MG/1
1 TABLET ORAL
Qty: 42 | Refills: 0
Start: 2024-12-21 | End: 2024-12-27

## 2024-12-21 RX ORDER — TRAMADOL HYDROCHLORIDE 50 MG/1
1 TABLET ORAL
Qty: 0 | Refills: 0 | DISCHARGE
Start: 2024-12-21

## 2024-12-21 RX ADMIN — FAMOTIDINE 20 MILLIGRAM(S): 20 TABLET, FILM COATED ORAL at 13:01

## 2024-12-21 RX ADMIN — DABIGATRAN ETEXILATE 150 MILLIGRAM(S): 110 CAPSULE ORAL at 08:05

## 2024-12-21 RX ADMIN — ACETAMINOPHEN 1000 MILLIGRAM(S): 80 SOLUTION/ DROPS ORAL at 08:01

## 2024-12-21 RX ADMIN — SENNOSIDES 2 TABLET(S): 8.6 TABLET, FILM COATED ORAL at 21:36

## 2024-12-21 RX ADMIN — Medication 2: at 22:27

## 2024-12-21 RX ADMIN — Medication 17 GRAM(S): at 21:36

## 2024-12-21 RX ADMIN — ONDANSETRON 4 MILLIGRAM(S): 4 TABLET ORAL at 03:19

## 2024-12-21 RX ADMIN — Medication 1 TABLET(S): at 13:01

## 2024-12-21 RX ADMIN — SENNOSIDES 2 TABLET(S): 8.6 TABLET, FILM COATED ORAL at 03:21

## 2024-12-21 RX ADMIN — ACETAMINOPHEN 1000 MILLIGRAM(S): 80 SOLUTION/ DROPS ORAL at 13:01

## 2024-12-21 RX ADMIN — ACETAMINOPHEN 1000 MILLIGRAM(S): 80 SOLUTION/ DROPS ORAL at 09:00

## 2024-12-21 RX ADMIN — TRAMADOL HYDROCHLORIDE 50 MILLIGRAM(S): 50 TABLET ORAL at 08:25

## 2024-12-21 RX ADMIN — DABIGATRAN ETEXILATE 150 MILLIGRAM(S): 110 CAPSULE ORAL at 18:05

## 2024-12-21 RX ADMIN — AMIODARONE HYDROCHLORIDE 100 MILLIGRAM(S): 200 TABLET ORAL at 08:06

## 2024-12-21 RX ADMIN — ACETAMINOPHEN 1000 MILLIGRAM(S): 80 SOLUTION/ DROPS ORAL at 21:35

## 2024-12-21 RX ADMIN — Medication 2: at 13:00

## 2024-12-21 RX ADMIN — Medication 5 MILLIGRAM(S): at 08:02

## 2024-12-21 RX ADMIN — Medication 81 MILLIGRAM(S): at 13:01

## 2024-12-21 RX ADMIN — Medication 17 GRAM(S): at 03:20

## 2024-12-21 RX ADMIN — LISINOPRIL 20 MILLIGRAM(S): 30 TABLET ORAL at 08:05

## 2024-12-21 RX ADMIN — TRAMADOL HYDROCHLORIDE 50 MILLIGRAM(S): 50 TABLET ORAL at 09:00

## 2024-12-21 RX ADMIN — Medication 25 MILLIGRAM(S): at 08:08

## 2024-12-21 RX ADMIN — Medication 2: at 18:10

## 2024-12-21 NOTE — PROGRESS NOTE ADULT - SUBJECTIVE AND OBJECTIVE BOX
Patient is a 84y old  Male who presents with a chief complaint of left hip pain (20 Dec 2024 15:52).    Patient seen and examined today. He reports mild left hip pain with movement. He has not moved his bowels in a couple of days but passing gas. He denies vomiting, abdominal pain, fever, dyspnea, chest pain.     Allergies    statins (Hepatotoxicity)      MEDICATIONS  (STANDING):  acetaminophen     Tablet .. 1000 milliGRAM(s) Oral every 8 hours  aMIOdarone    Tablet 100 milliGRAM(s) Oral daily  amLODIPine   Tablet 5 milliGRAM(s) Oral daily  aspirin enteric coated 81 milliGRAM(s) Oral daily  dabigatran 150 milliGRAM(s) Oral every 12 hours  dextrose 5%. 1000 milliLiter(s) (50 mL/Hr) IV Continuous <Continuous>  dextrose 5%. 1000 milliLiter(s) (100 mL/Hr) IV Continuous <Continuous>  dextrose 50% Injectable 25 Gram(s) IV Push once  dextrose 50% Injectable 12.5 Gram(s) IV Push once  dextrose 50% Injectable 25 Gram(s) IV Push once  famotidine    Tablet 20 milliGRAM(s) Oral daily  glucagon  Injectable 1 milliGRAM(s) IntraMuscular once  HYDROmorphone  Injectable 0.5 milliGRAM(s) IV Push once  insulin lispro (ADMELOG) corrective regimen sliding scale   SubCutaneous Before meals and at bedtime  lactated ringers. 1000 milliLiter(s) (75 mL/Hr) IV Continuous <Continuous>  lisinopril 20 milliGRAM(s) Oral daily  metoprolol succinate ER 25 milliGRAM(s) Oral daily  multivitamin 1 Tablet(s) Oral daily  polyethylene glycol 3350 17 Gram(s) Oral at bedtime  senna 2 Tablet(s) Oral at bedtime    MEDICATIONS  (PRN):  dextrose Oral Gel 15 Gram(s) Oral once PRN Blood Glucose LESS THAN 70 milliGRAM(s)/deciliter  magnesium hydroxide Suspension 30 milliLiter(s) Oral daily PRN Constipation  ondansetron Injectable 4 milliGRAM(s) IV Push every 6 hours PRN Nausea and/or Vomiting  traMADol 50 milliGRAM(s) Oral every 6 hours PRN Mild Pain (1 - 3)      Vital Signs Last 24 Hrs  T(C): 36.7 (12-21-24 @ 09:15), Max: 36.7 (12-21-24 @ 01:04)  T(F): 98.1 (12-21-24 @ 09:15), Max: 98.1 (12-21-24 @ 09:15)  HR: 68 (12-21-24 @ 09:15) (64 - 82)  BP: 138/84 (12-21-24 @ 09:15) (108/61 - 159/74)  BP(mean): --  RR: 18 (12-21-24 @ 09:15) (15 - 18)  SpO2: 97% (12-21-24 @ 09:15) (95% - 100%)      I&O's Summary    20 Dec 2024 07:01  -  21 Dec 2024 07:00  --------------------------------------------------------  IN: 0 mL / OUT: 100 mL / NET: -100 mL      Oxygen Saturation Index= Unable to calculate   [Based on FiO2 = Unknown, SpO2 = 97(12/21/2024 09:15), MAP = Unknown]    PHYSICAL EXAM:  GENERAL: NAD  HEAD:  Atraumatic, Normocephalic  EYES: EOMI, conjunctiva and sclera clear  ENMT: Moist mucous membranes  NECK: Supple, No JVD  NERVOUS SYSTEM:  Alert & Oriented X3, Moves extremities  CHEST/LUNG: Clear to auscultation bilaterally  HEART: regular rhythm  ABDOMEN: Soft, Nontender, Bowel sounds present  EXTREMITIES:  left hip dressing c/d/i; 2+ Peripheral Pulses  PSYCH: Normal mood and affect    Consultant(s) Notes Reviewed:  [x ] YES  [ ] NO  Care Discussed with Consultants/Other Providers [ x] YES  [ ] NO      Investigations:                        9.5    10.57 )-----------( 489      ( 20 Dec 2024 05:30 )             32.2     12-20    137  |  105  |  25[H]  ----------------------------<  105[H]  4.4   |  23  |  0.81    Ca    8.5      20 Dec 2024 05:30

## 2024-12-21 NOTE — PROGRESS NOTE ADULT - SUBJECTIVE AND OBJECTIVE BOX
Ortho Note    Pt seen during morning rounds while laying comfortable without complaints in bed. Pt states pain is controlled and that the is feeling well today.   Denies CP, SOB, HA, dysphagia, N/V, numbness/tingling     Vital Signs Last 24 Hrs  T(C): 36.4 (12-19-24 @ 13:35), Max: 36.4 (12-19-24 @ 13:35)  T(F): 97.5 (12-19-24 @ 13:35), Max: 97.5 (12-19-24 @ 13:35)  HR: 67 (12-19-24 @ 13:35) (67 - 67)  BP: 130/71 (12-19-24 @ 13:35) (130/71 - 130/71)  BP(mean): --  RR: 18 (12-19-24 @ 13:35) (18 - 18)  SpO2: 98% (12-19-24 @ 13:35) (98% - 98%)  I&O's Summary    18 Dec 2024 07:01  -  19 Dec 2024 07:00  --------------------------------------------------------  IN: 675 mL / OUT: 1200 mL / NET: -525 mL        General: Pt Alert and oriented, NAD  DSG C/D/I - Prineo   Pulses: 2+ DP. Skin warm, dry, well perfused b/l  Sensation: SILT L3-S1 b/l  Motor: EHL/FHL/TA/GS b/l      A/P: 85 y/o Male POD#4 s/p L YO with Dr. Echeverria  - Ayesha. Continue to appreciate medicine recommendations  - Pain Control  - Continue to monitor swallowing, if continues, consider speech and swallow consult  - DVT ppx: ASA, Pradaxa, SCDs  - PT, WBS: WBAT  - EHOB for meals. IS  - Bowel regimen  - Dispo: [facility]      Ortho Pager [phone]

## 2024-12-21 NOTE — PROGRESS NOTE ADULT - ASSESSMENT
Mr. Zuhair Mark is an 84/M with CAD, Atrial fibrillation on Pradaxa, CVA (?), DM type 2, HTN and osteoarthritis who presented with chronic left hip pain x 6 months that failed conservative management and admitted for an elective YO by Dr. Echeverria.    CAPILLARY BLOOD GLUCOSE  POCT Blood Glucose.: 107 mg/dL (21 Dec 2024 07:29)  POCT Blood Glucose.: 113 mg/dL (20 Dec 2024 22:02)  POCT Blood Glucose.: 143 mg/dL (20 Dec 2024 17:09)  POCT Blood Glucose.: 161 mg/dL (20 Dec 2024 12:31)    Recommendations:    #Left hip osteoarthritis   #Post op state  - pain controlled  - Provide adequate analgesia, Incentive spirometry, mobilize with fall precautions, bowel regimen, DVT prophylaxis  - PT/OT    #Constipation  - obtain abdominal Xray if still no BM today before advancing bowel regimen  - mobilize  - bowel regimen    #Acute blood loss anemia  - Monitor Hgb; no reported bleeding symptoms   - Transfuse for Hgb < 7  - Ensure Type and Screen available  - can send iron, TIBC, ferritin, B12, retic    #Urinary retention  - Lee catheter in place  - urology following  - ensure adequate bowel regimen in place    #Leucocytosis, improving  - likely reactive  - afebrile and denies symptoms of infection  - monitor temp and WBC trend    #CAD  #Atrial fibrillation  - rate controlled on Metoprolol, Amiodarone  - on Pradaxa  - chest pain free    #HTN  - on Metoprolol, Amlodipine and Lisinopril  - ensure BP parameters in place  - monitor K and Cr while on Lisinopril     #DM type 2  - A1C 7, controlled  - on correctional insulin; goal glucose 100-180 mg/dL  - can resume Metformin if no contraindication from orthopedics team  - DM diet    DVT ppx: Dabigatran    Feel free to reach out for any questions. Recommendations discussed with primary team.

## 2024-12-22 LAB
ANION GAP SERPL CALC-SCNC: 9 MMOL/L — SIGNIFICANT CHANGE UP (ref 5–17)
BUN SERPL-MCNC: 21 MG/DL — SIGNIFICANT CHANGE UP (ref 7–23)
CALCIUM SERPL-MCNC: 8.3 MG/DL — LOW (ref 8.4–10.5)
CHLORIDE SERPL-SCNC: 104 MMOL/L — SIGNIFICANT CHANGE UP (ref 96–108)
CO2 SERPL-SCNC: 22 MMOL/L — SIGNIFICANT CHANGE UP (ref 22–31)
CREAT SERPL-MCNC: 0.57 MG/DL — SIGNIFICANT CHANGE UP (ref 0.5–1.3)
EGFR: 95 ML/MIN/1.73M2 — SIGNIFICANT CHANGE UP
GLUCOSE SERPL-MCNC: 137 MG/DL — HIGH (ref 70–99)
HCT VFR BLD CALC: 31.9 % — LOW (ref 39–50)
HGB BLD-MCNC: 9.7 G/DL — LOW (ref 13–17)
MCHC RBC-ENTMCNC: 28.1 PG — SIGNIFICANT CHANGE UP (ref 27–34)
MCHC RBC-ENTMCNC: 30.4 G/DL — LOW (ref 32–36)
MCV RBC AUTO: 92.5 FL — SIGNIFICANT CHANGE UP (ref 80–100)
NRBC # BLD: 0 /100 WBCS — SIGNIFICANT CHANGE UP (ref 0–0)
PLATELET # BLD AUTO: 483 K/UL — HIGH (ref 150–400)
POTASSIUM SERPL-MCNC: 4 MMOL/L — SIGNIFICANT CHANGE UP (ref 3.5–5.3)
POTASSIUM SERPL-SCNC: 4 MMOL/L — SIGNIFICANT CHANGE UP (ref 3.5–5.3)
RBC # BLD: 3.45 M/UL — LOW (ref 4.2–5.8)
RBC # FLD: 15.1 % — HIGH (ref 10.3–14.5)
SODIUM SERPL-SCNC: 135 MMOL/L — SIGNIFICANT CHANGE UP (ref 135–145)
WBC # BLD: 10.59 K/UL — HIGH (ref 3.8–10.5)
WBC # FLD AUTO: 10.59 K/UL — HIGH (ref 3.8–10.5)

## 2024-12-22 PROCEDURE — 99233 SBSQ HOSP IP/OBS HIGH 50: CPT

## 2024-12-22 RX ORDER — BENZOCAINE AND MENTHOL 15; 3.6 MG/1; MG/1
1 LOZENGE ORAL EVERY 6 HOURS
Refills: 0 | Status: DISCONTINUED | OUTPATIENT
Start: 2024-12-22 | End: 2025-01-08

## 2024-12-22 RX ADMIN — Medication 81 MILLIGRAM(S): at 11:30

## 2024-12-22 RX ADMIN — SENNOSIDES 2 TABLET(S): 8.6 TABLET, FILM COATED ORAL at 21:14

## 2024-12-22 RX ADMIN — Medication 30 MILLILITER(S): at 11:30

## 2024-12-22 RX ADMIN — DABIGATRAN ETEXILATE 150 MILLIGRAM(S): 110 CAPSULE ORAL at 17:34

## 2024-12-22 RX ADMIN — SODIUM CHLORIDE 100 MILLILITER(S): 9 INJECTION, SOLUTION INTRAVENOUS at 00:41

## 2024-12-22 RX ADMIN — FAMOTIDINE 20 MILLIGRAM(S): 20 TABLET, FILM COATED ORAL at 11:30

## 2024-12-22 RX ADMIN — ACETAMINOPHEN 1000 MILLIGRAM(S): 80 SOLUTION/ DROPS ORAL at 05:32

## 2024-12-22 RX ADMIN — DABIGATRAN ETEXILATE 150 MILLIGRAM(S): 110 CAPSULE ORAL at 05:33

## 2024-12-22 RX ADMIN — ACETAMINOPHEN 1000 MILLIGRAM(S): 80 SOLUTION/ DROPS ORAL at 21:13

## 2024-12-22 RX ADMIN — TRAMADOL HYDROCHLORIDE 50 MILLIGRAM(S): 50 TABLET ORAL at 12:37

## 2024-12-22 RX ADMIN — Medication 5 MILLIGRAM(S): at 05:33

## 2024-12-22 RX ADMIN — Medication 1 TABLET(S): at 11:29

## 2024-12-22 RX ADMIN — Medication 25 MILLIGRAM(S): at 05:32

## 2024-12-22 RX ADMIN — TRAMADOL HYDROCHLORIDE 50 MILLIGRAM(S): 50 TABLET ORAL at 13:37

## 2024-12-22 RX ADMIN — LISINOPRIL 20 MILLIGRAM(S): 30 TABLET ORAL at 05:33

## 2024-12-22 RX ADMIN — Medication 2: at 22:29

## 2024-12-22 RX ADMIN — AMIODARONE HYDROCHLORIDE 100 MILLIGRAM(S): 200 TABLET ORAL at 05:32

## 2024-12-22 RX ADMIN — Medication 4: at 12:37

## 2024-12-22 RX ADMIN — Medication 17 GRAM(S): at 21:14

## 2024-12-22 RX ADMIN — ACETAMINOPHEN 1000 MILLIGRAM(S): 80 SOLUTION/ DROPS ORAL at 12:37

## 2024-12-22 NOTE — PROGRESS NOTE ADULT - SUBJECTIVE AND OBJECTIVE BOX
24HR EVENTS:     SUBJECTIVE: Pt seen and examined on morning rounds. Pain well controlled. Patient denies CP/SOB/N/V. Urinating without issue.      Vital Signs Last 24 Hrs  T(C): 36.3 (22 Dec 2024 08:40), Max: 36.8 (21 Dec 2024 13:01)  T(F): 97.3 (22 Dec 2024 08:40), Max: 98.3 (21 Dec 2024 13:01)  HR: 75 (22 Dec 2024 08:40) (62 - 76)  BP: 138/76 (22 Dec 2024 08:40) (129/72 - 154/89)  BP(mean): --  RR: 18 (22 Dec 2024 08:40) (16 - 18)  SpO2: 95% (22 Dec 2024 08:40) (95% - 100%)    Parameters below as of 22 Dec 2024 08:40  Patient On (Oxygen Delivery Method): room air        Physical Exam:  General: NAD, resting comfortably in bed    Assessment/Plan:  84yM s/p Left YO by Dr. SATISH Echeverria on 12-17  - Weight Bearing Status: Protected Weight Bearing, no active abduction of left hip, no abduction pillow  - Pain control  - DVT PPx: Pradaxa, ASA  - PT rec: HPT  - F/u AM labs  - Dispo: HPT  - f/u outpatient with Urologist Dr. Singh for JORGE A Adame, PGY-1  Orthopedic Surgery

## 2024-12-22 NOTE — PROGRESS NOTE ADULT - SUBJECTIVE AND OBJECTIVE BOX
Patient is a 84y old  Male who presents with a chief complaint of left hip pain (22 Dec 2024 09:01).    Patient seen and examined today. He has mild hip pain with movement. He has not had a bowel movement today but passes gas and denies vomiting, abdominal pain, fever, dyspnea.    Allergies    statins (Hepatotoxicity)    MEDICATIONS  (STANDING):  acetaminophen     Tablet .. 1000 milliGRAM(s) Oral every 8 hours  aMIOdarone    Tablet 100 milliGRAM(s) Oral daily  amLODIPine   Tablet 5 milliGRAM(s) Oral daily  aspirin enteric coated 81 milliGRAM(s) Oral daily  dabigatran 150 milliGRAM(s) Oral every 12 hours  dextrose 5%. 1000 milliLiter(s) (50 mL/Hr) IV Continuous <Continuous>  dextrose 5%. 1000 milliLiter(s) (100 mL/Hr) IV Continuous <Continuous>  dextrose 50% Injectable 25 Gram(s) IV Push once  dextrose 50% Injectable 12.5 Gram(s) IV Push once  dextrose 50% Injectable 25 Gram(s) IV Push once  famotidine    Tablet 20 milliGRAM(s) Oral daily  glucagon  Injectable 1 milliGRAM(s) IntraMuscular once  HYDROmorphone  Injectable 0.5 milliGRAM(s) IV Push once  insulin lispro (ADMELOG) corrective regimen sliding scale   SubCutaneous Before meals and at bedtime  lactated ringers. 1000 milliLiter(s) (100 mL/Hr) IV Continuous <Continuous>  lisinopril 20 milliGRAM(s) Oral daily  metoprolol succinate ER 25 milliGRAM(s) Oral daily  multivitamin 1 Tablet(s) Oral daily  polyethylene glycol 3350 17 Gram(s) Oral at bedtime  senna 2 Tablet(s) Oral at bedtime    MEDICATIONS  (PRN):  dextrose Oral Gel 15 Gram(s) Oral once PRN Blood Glucose LESS THAN 70 milliGRAM(s)/deciliter  magnesium hydroxide Suspension 30 milliLiter(s) Oral daily PRN Constipation  ondansetron Injectable 4 milliGRAM(s) IV Push every 6 hours PRN Nausea and/or Vomiting  traMADol 50 milliGRAM(s) Oral every 6 hours PRN Mild Pain (1 - 3)      Vital Signs Last 24 Hrs  T(C): 36.3 (12-22-24 @ 08:40), Max: 36.8 (12-21-24 @ 13:01)  T(F): 97.3 (12-22-24 @ 08:40), Max: 98.3 (12-21-24 @ 13:01)  HR: 75 (12-22-24 @ 08:40) (62 - 76)  BP: 138/76 (12-22-24 @ 08:40) (129/72 - 154/89)  BP(mean): --  RR: 18 (12-22-24 @ 08:40) (16 - 18)  SpO2: 95% (12-22-24 @ 08:40) (95% - 100%)      I&O's Summary    21 Dec 2024 07:01  -  22 Dec 2024 07:00  --------------------------------------------------------  IN: 700 mL / OUT: 725 mL / NET: -25 mL    22 Dec 2024 07:01  -  22 Dec 2024 12:11  --------------------------------------------------------  IN: 0 mL / OUT: 300 mL / NET: -300 mL      Oxygen Saturation Index= Unable to calculate   [Based on FiO2 = Unknown, SpO2 = 95(12/22/2024 08:40), MAP = Unknown]    PHYSICAL EXAM:  GENERAL: NAD  HEAD:  Atraumatic, Normocephalic  EYES: EOMI, conjunctiva and sclera clear  ENMT: Moist mucous membranes  NECK: Supple, No JVD  NERVOUS SYSTEM:  Alert & Oriented X3, Moves extremities  CHEST/LUNG: Clear to auscultation bilaterally  HEART: regular rhythm  ABDOMEN: Soft, Nontender, Bowel sounds present  EXTREMITIES:  left hip dressing c/d/i; 2+ Peripheral Pulses  PSYCH: Normal mood and affect    Consultant(s) Notes Reviewed:  [x ] YES  [ ] NO  Care Discussed with Consultants/Other Providers [ x] YES  [ ] NO      Investigations:                        9.7    10.59 )-----------( 483      ( 22 Dec 2024 05:30 )             31.9     12-22    135  |  104  |  21  ----------------------------<  137[H]  4.0   |  22  |  0.57    Ca    8.3[L]      22 Dec 2024 05:30

## 2024-12-22 NOTE — PROGRESS NOTE ADULT - ASSESSMENT
Mr. Zuhair Mark is an 84/M with CAD, Atrial fibrillation on Pradaxa, CVA (?), DM type 2, HTN and osteoarthritis who presented with chronic left hip pain x 6 months that failed conservative management and admitted for an elective YO by Dr. Echeverria.    CAPILLARY BLOOD GLUCOSE  POCT Blood Glucose.: 136 mg/dL (22 Dec 2024 07:27)  POCT Blood Glucose.: 160 mg/dL (21 Dec 2024 21:53)  POCT Blood Glucose.: 154 mg/dL (21 Dec 2024 17:51)      Recommendations:    #Left hip osteoarthritis   #Post op state  - pain controlled  - Provide adequate analgesia, Incentive spirometry, mobilize with fall precautions, bowel regimen, DVT prophylaxis  - PT/OT    #Constipation  -  no BM today  - obtain abdominal Xray to r/o obstruction before advancing bowel regimen  - mobilize    #Acute blood loss anemia  - Monitor Hgb; no reported bleeding symptoms   - Transfuse for Hgb < 7  - Ensure Type and Screen available  - can send iron, TIBC, ferritin, B12, retic    #Urinary retention  - Lee catheter in place  - urology following  - ensure adequate bowel regimen in place    #Leucocytosis  - likely reactive  - afebrile and denies symptoms of infection  - monitor temp and WBC trend  - if worsening then consider sending UA    #CAD  #Atrial fibrillation  - rate controlled on Metoprolol, Amiodarone  - on Pradaxa  - chest pain free    #HTN  - on Metoprolol, Amlodipine and Lisinopril  - ensure BP parameters in place  - monitor K and Cr while on Lisinopril     #DM type 2  - A1C 7, controlled  - on correctional insulin; goal glucose 100-180 mg/dL  - can resume Metformin if no contraindication from orthopedics team  - DM diet    DVT ppx: Dabigatran    Feel free to reach out for any questions. Recommendations discussed with primary team.

## 2024-12-23 LAB
ANION GAP SERPL CALC-SCNC: 9 MMOL/L — SIGNIFICANT CHANGE UP (ref 5–17)
BUN SERPL-MCNC: 27 MG/DL — HIGH (ref 7–23)
CALCIUM SERPL-MCNC: 8.3 MG/DL — LOW (ref 8.4–10.5)
CHLORIDE SERPL-SCNC: 107 MMOL/L — SIGNIFICANT CHANGE UP (ref 96–108)
CO2 SERPL-SCNC: 23 MMOL/L — SIGNIFICANT CHANGE UP (ref 22–31)
CREAT SERPL-MCNC: 0.61 MG/DL — SIGNIFICANT CHANGE UP (ref 0.5–1.3)
EGFR: 94 ML/MIN/1.73M2 — SIGNIFICANT CHANGE UP
GLUCOSE SERPL-MCNC: 118 MG/DL — HIGH (ref 70–99)
HCT VFR BLD CALC: 33.4 % — LOW (ref 39–50)
HGB BLD-MCNC: 9.6 G/DL — LOW (ref 13–17)
MCHC RBC-ENTMCNC: 27.2 PG — SIGNIFICANT CHANGE UP (ref 27–34)
MCHC RBC-ENTMCNC: 28.7 G/DL — LOW (ref 32–36)
MCV RBC AUTO: 94.6 FL — SIGNIFICANT CHANGE UP (ref 80–100)
NRBC # BLD: 0 /100 WBCS — SIGNIFICANT CHANGE UP (ref 0–0)
PLATELET # BLD AUTO: 588 K/UL — HIGH (ref 150–400)
POTASSIUM SERPL-MCNC: 4.9 MMOL/L — SIGNIFICANT CHANGE UP (ref 3.5–5.3)
POTASSIUM SERPL-SCNC: 4.9 MMOL/L — SIGNIFICANT CHANGE UP (ref 3.5–5.3)
RBC # BLD: 3.53 M/UL — LOW (ref 4.2–5.8)
RBC # FLD: 15.2 % — HIGH (ref 10.3–14.5)
SODIUM SERPL-SCNC: 139 MMOL/L — SIGNIFICANT CHANGE UP (ref 135–145)
WBC # BLD: 10.61 K/UL — HIGH (ref 3.8–10.5)
WBC # FLD AUTO: 10.61 K/UL — HIGH (ref 3.8–10.5)

## 2024-12-23 PROCEDURE — 74018 RADEX ABDOMEN 1 VIEW: CPT | Mod: 26

## 2024-12-23 PROCEDURE — 99233 SBSQ HOSP IP/OBS HIGH 50: CPT

## 2024-12-23 RX ORDER — POLYETHYLENE GLYCOL 3350 17 G/DOSE
17 POWDER (GRAM) ORAL
Refills: 0 | Status: DISCONTINUED | OUTPATIENT
Start: 2024-12-23 | End: 2025-01-08

## 2024-12-23 RX ORDER — TRAMADOL HYDROCHLORIDE 50 MG/1
50 TABLET ORAL EVERY 6 HOURS
Refills: 0 | Status: DISCONTINUED | OUTPATIENT
Start: 2024-12-23 | End: 2024-12-23

## 2024-12-23 RX ADMIN — LISINOPRIL 20 MILLIGRAM(S): 30 TABLET ORAL at 06:03

## 2024-12-23 RX ADMIN — ACETAMINOPHEN 1000 MILLIGRAM(S): 80 SOLUTION/ DROPS ORAL at 13:21

## 2024-12-23 RX ADMIN — Medication 2: at 07:41

## 2024-12-23 RX ADMIN — Medication 5 MILLIGRAM(S): at 06:03

## 2024-12-23 RX ADMIN — Medication 25 MILLIGRAM(S): at 06:02

## 2024-12-23 RX ADMIN — DABIGATRAN ETEXILATE 150 MILLIGRAM(S): 110 CAPSULE ORAL at 06:02

## 2024-12-23 RX ADMIN — Medication 1 TABLET(S): at 12:05

## 2024-12-23 RX ADMIN — ACETAMINOPHEN 1000 MILLIGRAM(S): 80 SOLUTION/ DROPS ORAL at 06:03

## 2024-12-23 RX ADMIN — SENNOSIDES 2 TABLET(S): 8.6 TABLET, FILM COATED ORAL at 21:05

## 2024-12-23 RX ADMIN — FAMOTIDINE 20 MILLIGRAM(S): 20 TABLET, FILM COATED ORAL at 12:05

## 2024-12-23 RX ADMIN — Medication 17 GRAM(S): at 18:16

## 2024-12-23 RX ADMIN — ACETAMINOPHEN 1000 MILLIGRAM(S): 80 SOLUTION/ DROPS ORAL at 21:05

## 2024-12-23 RX ADMIN — Medication 4: at 12:06

## 2024-12-23 RX ADMIN — Medication 81 MILLIGRAM(S): at 12:05

## 2024-12-23 RX ADMIN — DABIGATRAN ETEXILATE 150 MILLIGRAM(S): 110 CAPSULE ORAL at 17:11

## 2024-12-23 RX ADMIN — AMIODARONE HYDROCHLORIDE 100 MILLIGRAM(S): 200 TABLET ORAL at 06:02

## 2024-12-23 NOTE — PATIENT PROFILE ADULT - FUNCTIONAL SCREEN CURRENT LEVEL: SWALLOWING (IF SCORE 2 OR MORE FOR ANY ITEM, CONSULT REHAB SERVICES), MLM)
0 = swallows foods/liquids without difficulty
decreased alfred/decreased velocity of limb motion/decreased stride length/decreased weight-shifting ability/decreased step length

## 2024-12-23 NOTE — PROGRESS NOTE ADULT - SUBJECTIVE AND OBJECTIVE BOX
Patient is a 84y old  Male who presents with a chief complaint of left hip pain (23 Dec 2024 07:26).    Patient seen and examined today. He denies hip pain at rest but has some pain with movement. He has not had a BM today but reports no vomiting, abdominal pain and is passing gas.     Allergies    statins (Hepatotoxicity)    MEDICATIONS  (STANDING):  acetaminophen     Tablet .. 1000 milliGRAM(s) Oral every 8 hours  aMIOdarone    Tablet 100 milliGRAM(s) Oral daily  amLODIPine   Tablet 5 milliGRAM(s) Oral daily  aspirin enteric coated 81 milliGRAM(s) Oral daily  dabigatran 150 milliGRAM(s) Oral every 12 hours  dextrose 5%. 1000 milliLiter(s) (100 mL/Hr) IV Continuous <Continuous>  dextrose 5%. 1000 milliLiter(s) (50 mL/Hr) IV Continuous <Continuous>  dextrose 50% Injectable 25 Gram(s) IV Push once  dextrose 50% Injectable 12.5 Gram(s) IV Push once  dextrose 50% Injectable 25 Gram(s) IV Push once  famotidine    Tablet 20 milliGRAM(s) Oral daily  glucagon  Injectable 1 milliGRAM(s) IntraMuscular once  HYDROmorphone  Injectable 0.5 milliGRAM(s) IV Push once  insulin lispro (ADMELOG) corrective regimen sliding scale   SubCutaneous Before meals and at bedtime  lactated ringers. 1000 milliLiter(s) (100 mL/Hr) IV Continuous <Continuous>  lisinopril 20 milliGRAM(s) Oral daily  metoprolol succinate ER 25 milliGRAM(s) Oral daily  multivitamin 1 Tablet(s) Oral daily  polyethylene glycol 3350 17 Gram(s) Oral at bedtime  senna 2 Tablet(s) Oral at bedtime    MEDICATIONS  (PRN):  benzocaine/menthol Lozenge 1 Lozenge Oral every 6 hours PRN Sore Throat  dextrose Oral Gel 15 Gram(s) Oral once PRN Blood Glucose LESS THAN 70 milliGRAM(s)/deciliter  magnesium hydroxide Suspension 30 milliLiter(s) Oral daily PRN Constipation  ondansetron Injectable 4 milliGRAM(s) IV Push every 6 hours PRN Nausea and/or Vomiting  traMADol 50 milliGRAM(s) Oral every 6 hours PRN Mild Pain (1 - 3)    Vital Signs Last 24 Hrs  T(C): 36.7 (12-23-24 @ 08:15), Max: 36.7 (12-22-24 @ 17:30)  T(F): 98 (12-23-24 @ 08:15), Max: 98 (12-22-24 @ 17:30)  HR: 70 (12-23-24 @ 08:15) (65 - 84)  BP: 137/87 (12-23-24 @ 08:15) (107/73 - 146/75)  BP(mean): --  RR: 16 (12-23-24 @ 08:15) (16 - 19)  SpO2: 95% (12-23-24 @ 08:15) (93% - 99%)      I&O's Summary    22 Dec 2024 07:01  -  23 Dec 2024 07:00  --------------------------------------------------------  IN: 200 mL / OUT: 700 mL / NET: -500 mL      Oxygen Saturation Index= Unable to calculate   [Based on FiO2 = Unknown, SpO2 = 95(12/23/2024 08:15), MAP = Unknown]    PHYSICAL EXAM:  GENERAL: NAD  HEAD:  Atraumatic, Normocephalic  EYES: EOMI, conjunctiva and sclera clear  ENMT: Moist mucous membranes  NECK: Supple, No JVD  NERVOUS SYSTEM:  Alert & Oriented X3, Moves extremities  CHEST/LUNG: Clear to auscultation bilaterally  HEART: regular rhythm  ABDOMEN: Soft, Nontender, Bowel sounds present  EXTREMITIES:  left hip dressing c/d/i; 2+ Peripheral Pulses  PSYCH: Normal mood and affect    Consultant(s) Notes Reviewed:  [x ] YES  [ ] NO  Care Discussed with Consultants/Other Providers [ x] YES  [ ] NO      Investigations:                        9.6    10.61 )-----------( 588      ( 23 Dec 2024 10:00 )             33.4     12-23    139  |  107  |  27[H]  ----------------------------<  118[H]  4.9   |  23  |  0.61    Ca    8.3[L]      23 Dec 2024 10:00

## 2024-12-23 NOTE — PROGRESS NOTE ADULT - ASSESSMENT
Mr. Zuhair Mark is an 84/M with CAD, Atrial fibrillation on Pradaxa, CVA (?), DM type 2, HTN and osteoarthritis who presented with chronic left hip pain x 6 months that failed conservative management and admitted for an elective YO by Dr. Echeverria.    CAPILLARY BLOOD GLUCOSE  POCT Blood Glucose.: 151 mg/dL (23 Dec 2024 07:37)  POCT Blood Glucose.: 154 mg/dL (22 Dec 2024 22:16)  POCT Blood Glucose.: 119 mg/dL (22 Dec 2024 17:39)  POCT Blood Glucose.: 219 mg/dL (22 Dec 2024 12:34)      Recommendations:    #Left hip osteoarthritis   #Post op state  - pain controlled  - Provide adequate analgesia, Incentive spirometry, mobilize with fall precautions, bowel regimen, DVT prophylaxis  - PT/OT    #Constipation  - obtain abdominal Xray to r/o obstruction before advancing bowel regimen  - mobilize    #Acute blood loss anemia  - Monitor Hgb; no reported bleeding symptoms   - Transfuse for Hgb < 7  - Ensure Type and Screen available  - can send iron, TIBC, ferritin, B12, retic    #Urinary retention  - Lee catheter in place  - urology following  - ensure adequate bowel regimen in place    #Leucocytosis  - likely reactive  - afebrile and denies symptoms of infection  - monitor temp and WBC trend  - if worsening then consider sending UA    #CAD  #Atrial fibrillation  - rate controlled on Metoprolol, Amiodarone  - on Pradaxa  - chest pain free    #HTN  - on Metoprolol, Amlodipine and Lisinopril  - ensure BP parameters in place  - monitor K and Cr while on Lisinopril     #DM type 2  - A1C 7, controlled  - on correctional insulin; goal glucose 100-180 mg/dL  - can resume Metformin if no contraindication from orthopedics team  - DM diet    DVT ppx: Dabigatran    Feel free to reach out for any questions. Recommendations discussed with primary team.

## 2024-12-23 NOTE — PROGRESS NOTE ADULT - SUBJECTIVE AND OBJECTIVE BOX
24HR EVENTS:     SUBJECTIVE: Pt seen and examined on morning rounds. Pain well controlled. Patient denies CP/SOB/N/V. Urinating without issue.      Vital Signs Last 24 Hrs  T(C): 36.3 (22 Dec 2024 08:40), Max: 36.8 (21 Dec 2024 13:01)  T(F): 97.3 (22 Dec 2024 08:40), Max: 98.3 (21 Dec 2024 13:01)  HR: 75 (22 Dec 2024 08:40) (62 - 76)  BP: 138/76 (22 Dec 2024 08:40) (129/72 - 154/89)  BP(mean): --  RR: 18 (22 Dec 2024 08:40) (16 - 18)  SpO2: 95% (22 Dec 2024 08:40) (95% - 100%)    Parameters below as of 22 Dec 2024 08:40  Patient On (Oxygen Delivery Method): room air        Physical Exam:  General: NAD, resting comfortably in bed    Assessment/Plan:  84yM s/p Left YO by Dr. SATISH Echeverria on 12-17  - Weight Bearing Status: Protected Weight Bearing, no active abduction of left hip, no abduction pillow  - Pain control  - DVT PPx: Pradaxa, ASA  - PT rec: HPT  - F/u AM labs  - Dispo: HPT due to lack of Medicare [facility] days remaining   - f/u outpatient with Urologist Dr. Singh for TOV, maintain juarez in house intil outpt FU

## 2024-12-24 LAB
ANION GAP SERPL CALC-SCNC: 7 MMOL/L — SIGNIFICANT CHANGE UP (ref 5–17)
BUN SERPL-MCNC: 29 MG/DL — HIGH (ref 7–23)
CALCIUM SERPL-MCNC: 8.4 MG/DL — SIGNIFICANT CHANGE UP (ref 8.4–10.5)
CHLORIDE SERPL-SCNC: 106 MMOL/L — SIGNIFICANT CHANGE UP (ref 96–108)
CO2 SERPL-SCNC: 22 MMOL/L — SIGNIFICANT CHANGE UP (ref 22–31)
CREAT SERPL-MCNC: 0.57 MG/DL — SIGNIFICANT CHANGE UP (ref 0.5–1.3)
EGFR: 95 ML/MIN/1.73M2 — SIGNIFICANT CHANGE UP
GLUCOSE SERPL-MCNC: 128 MG/DL — HIGH (ref 70–99)
HCT VFR BLD CALC: 31.7 % — LOW (ref 39–50)
HGB BLD-MCNC: 9.8 G/DL — LOW (ref 13–17)
MCHC RBC-ENTMCNC: 28.2 PG — SIGNIFICANT CHANGE UP (ref 27–34)
MCHC RBC-ENTMCNC: 30.9 G/DL — LOW (ref 32–36)
MCV RBC AUTO: 91.4 FL — SIGNIFICANT CHANGE UP (ref 80–100)
NRBC # BLD: 0 /100 WBCS — SIGNIFICANT CHANGE UP (ref 0–0)
PLATELET # BLD AUTO: 522 K/UL — HIGH (ref 150–400)
POTASSIUM SERPL-MCNC: 4.2 MMOL/L — SIGNIFICANT CHANGE UP (ref 3.5–5.3)
POTASSIUM SERPL-SCNC: 4.2 MMOL/L — SIGNIFICANT CHANGE UP (ref 3.5–5.3)
RBC # BLD: 3.47 M/UL — LOW (ref 4.2–5.8)
RBC # FLD: 15.3 % — HIGH (ref 10.3–14.5)
SODIUM SERPL-SCNC: 135 MMOL/L — SIGNIFICANT CHANGE UP (ref 135–145)
WBC # BLD: 9.86 K/UL — SIGNIFICANT CHANGE UP (ref 3.8–10.5)
WBC # FLD AUTO: 9.86 K/UL — SIGNIFICANT CHANGE UP (ref 3.8–10.5)

## 2024-12-24 PROCEDURE — 99233 SBSQ HOSP IP/OBS HIGH 50: CPT

## 2024-12-24 RX ORDER — TRAMADOL HYDROCHLORIDE 50 MG/1
1 TABLET ORAL
Qty: 28 | Refills: 0
Start: 2024-12-24 | End: 2024-12-30

## 2024-12-24 RX ADMIN — FAMOTIDINE 20 MILLIGRAM(S): 20 TABLET, FILM COATED ORAL at 11:13

## 2024-12-24 RX ADMIN — Medication 81 MILLIGRAM(S): at 11:13

## 2024-12-24 RX ADMIN — AMIODARONE HYDROCHLORIDE 100 MILLIGRAM(S): 200 TABLET ORAL at 05:46

## 2024-12-24 RX ADMIN — DABIGATRAN ETEXILATE 150 MILLIGRAM(S): 110 CAPSULE ORAL at 05:46

## 2024-12-24 RX ADMIN — Medication 5 MILLIGRAM(S): at 05:46

## 2024-12-24 RX ADMIN — Medication 17 GRAM(S): at 05:47

## 2024-12-24 RX ADMIN — ACETAMINOPHEN 1000 MILLIGRAM(S): 80 SOLUTION/ DROPS ORAL at 13:14

## 2024-12-24 RX ADMIN — DABIGATRAN ETEXILATE 150 MILLIGRAM(S): 110 CAPSULE ORAL at 17:23

## 2024-12-24 RX ADMIN — SODIUM CHLORIDE 100 MILLILITER(S): 9 INJECTION, SOLUTION INTRAVENOUS at 05:46

## 2024-12-24 RX ADMIN — ACETAMINOPHEN 1000 MILLIGRAM(S): 80 SOLUTION/ DROPS ORAL at 22:09

## 2024-12-24 RX ADMIN — Medication 25 MILLIGRAM(S): at 05:46

## 2024-12-24 RX ADMIN — Medication 17 GRAM(S): at 17:23

## 2024-12-24 RX ADMIN — Medication 1 TABLET(S): at 11:13

## 2024-12-24 RX ADMIN — Medication 2: at 12:28

## 2024-12-24 RX ADMIN — ACETAMINOPHEN 1000 MILLIGRAM(S): 80 SOLUTION/ DROPS ORAL at 05:46

## 2024-12-24 RX ADMIN — LISINOPRIL 20 MILLIGRAM(S): 30 TABLET ORAL at 05:47

## 2024-12-24 NOTE — PROGRESS NOTE ADULT - ASSESSMENT
Mr. Zuhair Mark is an 84/M with CAD, Atrial fibrillation on Pradaxa, CVA (?), DM type 2, HTN and osteoarthritis who presented with chronic left hip pain x 6 months that failed conservative management and admitted for an elective YO by Dr. Echeverria.    CAPILLARY BLOOD GLUCOSE  POCT Blood Glucose.: 169 mg/dL (24 Dec 2024 11:38)  POCT Blood Glucose.: 138 mg/dL (24 Dec 2024 08:20)  POCT Blood Glucose.: 138 mg/dL (23 Dec 2024 21:58)  POCT Blood Glucose.: 148 mg/dL (23 Dec 2024 16:51)    Recommendations:    #Left hip osteoarthritis   #Post op state  - pain controlled on Tylenol 1g q8h and PRN Tramadol  - Provide adequate analgesia, Incentive spirometry, mobilize with fall precautions, bowel regimen, DVT prophylaxis  - PT/OT    #Constipation  - abdomen Xray no obstruction nor free air  - BM reported by the patient   - mobilize  - bowel regimen with Miralax BID and Senna once at night    #Acute blood loss anemia  - Monitor Hgb; no reported bleeding symptoms   - Transfuse for Hgb < 7  - Ensure Type and Screen available  - can send iron, TIBC, ferritin, B12, retic    #Urinary retention  - Lee catheter in place  - urology following  - ensure adequate bowel regimen in place    #Leucocytosis, resolved  - likely reactive  - afebrile and denies symptoms of infection  - monitor temp and WBC trend    #CAD  #Atrial fibrillation  - rate controlled on Metoprolol, Amiodarone  - on Pradaxa  - chest pain free    #HTN  - on Metoprolol, Amlodipine and Lisinopril  - ensure BP parameters in place    #DM type 2  - A1C 7, controlled  - on correctional insulin; goal glucose 100-180 mg/dL  - can resume Metformin  - DM diet    DVT ppx: Dabigatran    Feel free to reach out for any questions. Recommendations discussed with primary team.

## 2024-12-24 NOTE — PROGRESS NOTE ADULT - SUBJECTIVE AND OBJECTIVE BOX
Ortho Note    Pt seen during  morning rounds. Pt pain is well controlled and states he is doing well this morning. Pt endorses passing a large BM this morning. Pt is concerned that he will not be able to do well at home and think that he needs to stay in the hospital longer for therapy. Pt endorses tolerating his diet well.  Denies CP, SOB, N/V, numbness/tingling     Vital Signs Last 24 Hrs  T(C): 36.3 (12-24-24 @ 08:20), Max: 36.3 (12-24-24 @ 08:20)  T(F): 97.4 (12-24-24 @ 08:20), Max: 97.4 (12-24-24 @ 08:20)  HR: 75 (12-24-24 @ 08:20) (75 - 75)  BP: 137/75 (12-24-24 @ 08:20) (137/75 - 137/75)  BP(mean): --  RR: 18 (12-24-24 @ 08:20) (18 - 18)  SpO2: 99% (12-24-24 @ 08:20) (99% - 99%)  I&O's Summary    23 Dec 2024 07:01  -  24 Dec 2024 07:00  --------------------------------------------------------  IN: 1100 mL / OUT: 1250 mL / NET: -150 mL    24 Dec 2024 07:01  -  24 Dec 2024 12:35  --------------------------------------------------------  IN: 500 mL / OUT: 650 mL / NET: -150 mL        General: Pt Alert and oriented, NAD  DSG C/D/I -  Prineo  Pulses: 2+ DP. skin warm, dry, brisk cap refil b/l  Sensation: SILT L3-S1s b/l  Motor: EHL/FHL/TA/GS 5/5 b/l                          9.8    9.86  )-----------( 522      ( 24 Dec 2024 05:30 )             31.7     12-24    135  |  106  |  29[H]  ----------------------------<  128[H]  4.2   |  22  |  0.57    Ca    8.4      24 Dec 2024 05:30        A/P: 83 y/o Male POD#7 s/p left total hip replacement with Dr. Echeverria   - Stable  - Continue to appreciate medicine recommendations  - Lee to stay in place and pt will follow up outpatient for TOV with Urology  - Discussion was had at the bedside with Dr. Echeverria, , patient, patient's aid (on phone), and myself to discuss the best option for patient discharge. It was determined in the conversation that the patient will return to his rehab and pay out of pocket for a couple weeks of therapy or pt will D/c Home with home PT services by the end of the week. Patient understood this and agreed to have a conversation with his aid to make a final decision by Thursday.   - Pain Control  - DVT ppx: ASA, SCDs  - PT, WBS: WBAT with walker at all times, no abduction to left hip, no abduction pillow  - Bowel regimen, IS  - EHOB for meals  - Dispo: HPT vs rehab pending pt final decision by end of week. Dr. Echeverria,  are aware and agreed with plan    Ortho Pager [phone] Ortho Note    Pt seen during  morning rounds. Pt pain is well controlled and states he is doing well this morning. Pt endorses passing a large BM this morning. Pt is concerned that he will not be able to do well at home and think that he needs to stay in the hospital longer for therapy. Pt endorses tolerating his diet well.  Denies CP, SOB, N/V, numbness/tingling     Vital Signs Last 24 Hrs  T(C): 36.3 (12-24-24 @ 08:20), Max: 36.3 (12-24-24 @ 08:20)  T(F): 97.4 (12-24-24 @ 08:20), Max: 97.4 (12-24-24 @ 08:20)  HR: 75 (12-24-24 @ 08:20) (75 - 75)  BP: 137/75 (12-24-24 @ 08:20) (137/75 - 137/75)  BP(mean): --  RR: 18 (12-24-24 @ 08:20) (18 - 18)  SpO2: 99% (12-24-24 @ 08:20) (99% - 99%)  I&O's Summary    23 Dec 2024 07:01  -  24 Dec 2024 07:00  --------------------------------------------------------  IN: 1100 mL / OUT: 1250 mL / NET: -150 mL    24 Dec 2024 07:01  -  24 Dec 2024 12:35  --------------------------------------------------------  IN: 500 mL / OUT: 650 mL / NET: -150 mL        General: Pt Alert and oriented, NAD  DSG C/D/I -  Prineo  Pulses: 2+ DP. skin warm, dry, brisk cap refil b/l  Sensation: SILT L3-S1s b/l  Motor: EHL/FHL/TA/GS 5/5 b/l                          9.8    9.86  )-----------( 522      ( 24 Dec 2024 05:30 )             31.7     12-24    135  |  106  |  29[H]  ----------------------------<  128[H]  4.2   |  22  |  0.57    Ca    8.4      24 Dec 2024 05:30        A/P: 85 y/o Male POD#7 s/p left total hip replacement with Dr. Echeverria   - Stable  - Continue to appreciate medicine recommendations  - Lee to stay in place and pt will follow up outpatient for TOV with Urology  - Discussion was had at the bedside with Dr. Echeverria, , patient, patient's aide (on phone), and myself to discuss the best option for patient discharge. It was determined in the conversation that the patient will return to his rehab and pay out of pocket for a couple weeks of therapy or pt will D/c Home with home PT services by the end of the week. Patient understood this and agreed to have a conversation with his aide to make a final decision by Thursday.   - Pain Control  - DVT ppx: ASA, SCDs  - PT, WBS: WBAT with walker at all times, no abduction to left hip, no abduction pillow  - Bowel regimen, IS  - EHOB for meals  - Dispo: HPT vs rehab pending pt final decision by end of week. Dr. Echeverria,  are aware and agreed with plan    Ortho Pager [phone]

## 2024-12-24 NOTE — CHART NOTE - NSCHARTNOTEFT_GEN_A_CORE
Admitting Diagnosis:   Patient is a 84y old  Male who presents with a chief complaint of left hip pain (24 Dec 2024 12:35)      PAST MEDICAL & SURGICAL HISTORY:  Renal artery stenosis      Afib      HTN (hypertension)      Hepatitis      DM (diabetes mellitus)      Intermittent claudication      CVA (cerebral vascular accident)  2014      Osteoarthritis      History of tonsillectomy      History of femoral angiogram  with stent placement      H/O eye surgery  b/l cataracts      Current Nutrition Order:  Diet, Consistent Carbohydrate/No Snacks:   Supplement Feeding Modality:  Oral  Ensure Max Cans or Servings Per Day:  1       Frequency:  Three Times a day (12-20-24 @ 13:26) [Active]     PO Intake: Good (%) [   ]  Fair (50-75%) [   ] Poor (<25%) [   ] ... unknown as RD unable to speak with pt this morning due to pt sleeping and unarousable, tolerating diet well as per ortho PA note, no further documentation of coughing after meals noted at this time.     GI Issues: constipation documented with last bowel movement documented 12/17 as per flowsheet, however, as per ortho PA note, pt endorsed having a bowel movement this morning (abdominal xray normal as per MD).     Pain: 0/10 as per flowsheet    Skin Integrity: surgical incisions; no pressure injuries documented, daroi 17, no edema documented.         12-23-24 @ 07:01  -  12-24-24 @ 07:00  --------------------------------------------------------  IN: 1100 mL / OUT: 1250 mL / NET: -150 mL    12-24-24 @ 07:01  -  12-24-24 @ 15:10  --------------------------------------------------------  IN: 500 mL / OUT: 650 mL / NET: -150 mL        Labs:   12-24    135  |  106  |  29[H]  ----------------------------<  128[H]  4.2   |  22  |  0.57    Ca    8.4      24 Dec 2024 05:30      CAPILLARY BLOOD GLUCOSE  POCT Blood Glucose.: 169 mg/dL (24 Dec 2024 11:38)  POCT Blood Glucose.: 138 mg/dL (24 Dec 2024 08:20)  POCT Blood Glucose.: 138 mg/dL (23 Dec 2024 21:58)  POCT Blood Glucose.: 148 mg/dL (23 Dec 2024 16:51)      Medications:  MEDICATIONS  (STANDING):  acetaminophen     Tablet .. 1000 milliGRAM(s) Oral every 8 hours  aMIOdarone    Tablet 100 milliGRAM(s) Oral daily  amLODIPine   Tablet 5 milliGRAM(s) Oral daily  aspirin enteric coated 81 milliGRAM(s) Oral daily  dabigatran 150 milliGRAM(s) Oral every 12 hours  dextrose 5%. 1000 milliLiter(s) (50 mL/Hr) IV Continuous <Continuous>  dextrose 5%. 1000 milliLiter(s) (100 mL/Hr) IV Continuous <Continuous>  dextrose 50% Injectable 25 Gram(s) IV Push once  dextrose 50% Injectable 12.5 Gram(s) IV Push once  dextrose 50% Injectable 25 Gram(s) IV Push once  famotidine    Tablet 20 milliGRAM(s) Oral daily  glucagon  Injectable 1 milliGRAM(s) IntraMuscular once  HYDROmorphone  Injectable 0.5 milliGRAM(s) IV Push once  insulin lispro (ADMELOG) corrective regimen sliding scale   SubCutaneous Before meals and at bedtime  lactated ringers. 1000 milliLiter(s) (100 mL/Hr) IV Continuous <Continuous>  lisinopril 20 milliGRAM(s) Oral daily  metoprolol succinate ER 25 milliGRAM(s) Oral daily  multivitamin 1 Tablet(s) Oral daily  polyethylene glycol 3350 17 Gram(s) Oral two times a day  senna 2 Tablet(s) Oral at bedtime    MEDICATIONS  (PRN):  benzocaine/menthol Lozenge 1 Lozenge Oral every 6 hours PRN Sore Throat  dextrose Oral Gel 15 Gram(s) Oral once PRN Blood Glucose LESS THAN 70 milliGRAM(s)/deciliter  magnesium hydroxide Suspension 30 milliLiter(s) Oral daily PRN Constipation  ondansetron Injectable 4 milliGRAM(s) IV Push every 6 hours PRN Nausea and/or Vomiting  traMADol 50 milliGRAM(s) Oral every 6 hours PRN Mild Pain (1 - 3)      Height for BMI (FEET)	5 Feet  Height for BMI (INCHES)	8 Inch(s)  Height for BMI (CENTIMETERS)	172.72 Centimeter(s)  Weight for BMI (lbs)	141 lb  Weight for BMI (kg)	64 kg  Body Mass Index	21.4  Ideal body weight 154 lb / 70 kg (92%)    Weight Change: No new wt obtained since 12/17, recommend weekly wt to track / trend wt changes.     Estimated energy needs:   Estimated Energy Needs Weight (lbs)	141 lb  Estimated Energy Needs Weight (kg)	63.9 kg  Estimated Energy Needs From (duyen/kg)	25  Estimated Energy Needs To (duyen/kg)	30  Estimated Energy Needs Calculated From (duyen/kg)	1597  Estimated Energy Needs Calculated To (duyen/kg)	1917    Estimated Protein Needs Weight (lbs)	141 lb  Estimated Protein Needs Weight (kg)	63.9 kg  Estimated Protein Needs From (g/kg)	1  Estimated Protein Needs To (g/kg)	1.3  Estimated Protein Needs Calculated From (g/kg)	63.9  Estimated Protein Needs Calculated To (g/kg)	83.07    Estimated Fluid Needs Weight (lbs)	141 lb  Estimated Fluid Needs Weight (kg)	63.9 kg  Estimated Fluid Needs From (ml/kg)	30  Estimated Fluid Needs To (ml/kg)	35  Estimated Fluid Needs Calculated From (ml/kg)	1917  Estimated Fluid Needs Calculated To (ml/kg)	2236    Other Calculations:	Based on dosing wt 141 pounds as pt within % ideal body weight (92%). Needs adjusted for post-op healing, advanced age.    Subjective: "84yoM with left hip pain x 6 months without inciting accident or injury. Patient endorses pain with movement. Denies prior surgeries to the hip or injection. He takes Tylenol at home for pain with some relief of symptoms. Pain persists despite conservative measures. Ambulates via wheelchair, occasionally uses a walker."     Patient seen at bedside on 9WO for nutrition follow up, however, RD unable to speak with pt as pt sleeping and unarousable upon visit this morning. Current diet order: consistent carb + Ensure Max 3x/day to optimize nutritional needs (provides 150 kcal, 30 g protein/ shake) - observed snacks brought in from outside the hospital at bedside as well as an Ensure Max ~50% consumed this morning. No further complains of coughing after eating noted in EMR. Constipation noted as per flowsheet with las bowel movement documented 12/17, however, as per ortho PA note, pt endorsed having a bowel movement this morning, ? accuracy but abdominal xray results came back normal as per MD. Labs: POCTs variable x24 hrs with some elevated levels (138, 138, 148, 222 mg/dL...). Meds reviewed as above. RD will continue to follow, see nutrition recommendations below.     Previous Nutrition Diagnosis: Inadequate Energy Intake related to decreased ability to meet increased nutrient needs due to decreased PO intake associated with coughing after eating status post surgery as evidenced by consuming </= 50% estimated nutrient needs at this time.     Active [   ]  Resolved [ x  ]    If resolved, new PES: Increased nutrient needs... protein related to physiological demands as evidenced by status post total hip replacement.     Goal: Pt will consume >/= 75% of estimated nutrient needs     Recommendations:   1. Continue current diet order + Ensure Max 3x/day to optimize nutritional needs (provides 150 kcal, 30 g protein/ shake)   2. Encourage and monitor PO intake, honor preferences as able   >> Consistently meet >75% of estimated needs during admission   3. Monitor wt trends, GI function, skin integrity  4. Monitor lytes, renal indices, blood glucose, LFTs    5. Pain and bowel regimen per team  6. Continue with MVI daily to meet 100% RDA needs   RD will continue to monitor PO intake, labs, hydration, and wt prn.    Education: RD unable to provide nutrition education upon visit this morning as pt sleeping and unarousable, will continue to provide education PRN upon follow up.     Risk Level: High [ x  ] Moderate [   ] Low [   ]

## 2024-12-24 NOTE — PROGRESS NOTE ADULT - SUBJECTIVE AND OBJECTIVE BOX
24HR EVENTS:     SUBJECTIVE: Pt seen and examined on morning rounds. Pain well controlled. Patient denies CP/SOB/N/V. Urinating without issue.    Vital Signs Last 24 Hrs  T(C): 36.9 (24 Dec 2024 05:15), Max: 36.9 (23 Dec 2024 16:54)  T(F): 98.4 (24 Dec 2024 05:15), Max: 98.4 (23 Dec 2024 16:54)  HR: 79 (24 Dec 2024 05:15) (74 - 97)  BP: 153/78 (24 Dec 2024 05:15) (108/57 - 153/78)  BP(mean): --  RR: 20 (24 Dec 2024 05:15) (16 - 20)  SpO2: 97% (24 Dec 2024 05:15) (96% - 97%)    Parameters below as of 24 Dec 2024 05:15  Patient On (Oxygen Delivery Method): room air      Assessment/Plan:  84yM s/p Left YO by Dr. SATISH Echeverria on 12-17  - Weight Bearing Status: Protected Weight Bearing, no active abduction of left hip, no abduction pillow  - Pain control  - DVT PPx: Pradaxa, ASA  - PT rec: HPT  - F/u AM labs  - Dispo: HPT due to lack of Medicare [facility] days remaining   - F/u BM today  - f/u outpatient with Urologist Dr. Singh for TOV, maintain juarez in house intil outpt FU

## 2024-12-24 NOTE — PROGRESS NOTE ADULT - SUBJECTIVE AND OBJECTIVE BOX
Patient is a 84y old  Male who presents with a chief complaint of left hip pain (24 Dec 2024 08:29).    Patient seen and examined today. He denies significant left hip pain. He reports moving his bowels today and denies abdominal pain, vomiting, dyspnea, fever.    Allergies    statins (Hepatotoxicity)      MEDICATIONS  (STANDING):  acetaminophen     Tablet .. 1000 milliGRAM(s) Oral every 8 hours  aMIOdarone    Tablet 100 milliGRAM(s) Oral daily  amLODIPine   Tablet 5 milliGRAM(s) Oral daily  aspirin enteric coated 81 milliGRAM(s) Oral daily  dabigatran 150 milliGRAM(s) Oral every 12 hours  dextrose 5%. 1000 milliLiter(s) (50 mL/Hr) IV Continuous <Continuous>  dextrose 5%. 1000 milliLiter(s) (100 mL/Hr) IV Continuous <Continuous>  dextrose 50% Injectable 25 Gram(s) IV Push once  dextrose 50% Injectable 12.5 Gram(s) IV Push once  dextrose 50% Injectable 25 Gram(s) IV Push once  famotidine    Tablet 20 milliGRAM(s) Oral daily  glucagon  Injectable 1 milliGRAM(s) IntraMuscular once  HYDROmorphone  Injectable 0.5 milliGRAM(s) IV Push once  insulin lispro (ADMELOG) corrective regimen sliding scale   SubCutaneous Before meals and at bedtime  lactated ringers. 1000 milliLiter(s) (100 mL/Hr) IV Continuous <Continuous>  lisinopril 20 milliGRAM(s) Oral daily  metoprolol succinate ER 25 milliGRAM(s) Oral daily  multivitamin 1 Tablet(s) Oral daily  polyethylene glycol 3350 17 Gram(s) Oral two times a day  senna 2 Tablet(s) Oral at bedtime    MEDICATIONS  (PRN):  benzocaine/menthol Lozenge 1 Lozenge Oral every 6 hours PRN Sore Throat  dextrose Oral Gel 15 Gram(s) Oral once PRN Blood Glucose LESS THAN 70 milliGRAM(s)/deciliter  magnesium hydroxide Suspension 30 milliLiter(s) Oral daily PRN Constipation  ondansetron Injectable 4 milliGRAM(s) IV Push every 6 hours PRN Nausea and/or Vomiting  traMADol 50 milliGRAM(s) Oral every 6 hours PRN Mild Pain (1 - 3)      Vital Signs Last 24 Hrs  T(C): 36.3 (12-24-24 @ 08:20), Max: 36.9 (12-23-24 @ 16:54)  T(F): 97.4 (12-24-24 @ 08:20), Max: 98.4 (12-23-24 @ 16:54)  HR: 75 (12-24-24 @ 08:20) (74 - 97)  BP: 137/75 (12-24-24 @ 08:20) (108/57 - 153/78)  BP(mean): --  RR: 18 (12-24-24 @ 08:20) (16 - 20)  SpO2: 99% (12-24-24 @ 08:20) (96% - 99%)      I&O's Summary    23 Dec 2024 07:01  -  24 Dec 2024 07:00  --------------------------------------------------------  IN: 1100 mL / OUT: 1250 mL / NET: -150 mL    24 Dec 2024 07:01  -  24 Dec 2024 12:36  --------------------------------------------------------  IN: 500 mL / OUT: 650 mL / NET: -150 mL      Oxygen Saturation Index= Unable to calculate   [Based on FiO2 = Unknown, SpO2 = 99(12/24/2024 08:20), MAP = Unknown]    PHYSICAL EXAM:  GENERAL: NAD  HEAD:  Atraumatic, Normocephalic  EYES: EOMI, conjunctiva and sclera clear  ENMT: Moist mucous membranes  NECK: Supple, No JVD  NERVOUS SYSTEM:  Alert & Oriented X3, Moves extremities  CHEST/LUNG: Clear to auscultation bilaterally  HEART: regular rhythm  ABDOMEN: Soft, Nontender, Bowel sounds present  EXTREMITIES:  left hip dressing c/d/i; 2+ Peripheral Pulses  PSYCH: Normal mood    Consultant(s) Notes Reviewed:  [x ] YES  [ ] NO  Care Discussed with Consultants/Other Providers [ x] YES  [ ] NO      Investigations:                        9.8    9.86  )-----------( 522      ( 24 Dec 2024 05:30 )             31.7     12-24    135  |  106  |  29[H]  ----------------------------<  128[H]  4.2   |  22  |  0.57    Ca    8.4      24 Dec 2024 05:30

## 2024-12-25 LAB
ANION GAP SERPL CALC-SCNC: 11 MMOL/L — SIGNIFICANT CHANGE UP (ref 5–17)
BUN SERPL-MCNC: 24 MG/DL — HIGH (ref 7–23)
CALCIUM SERPL-MCNC: 8.3 MG/DL — LOW (ref 8.4–10.5)
CHLORIDE SERPL-SCNC: 105 MMOL/L — SIGNIFICANT CHANGE UP (ref 96–108)
CO2 SERPL-SCNC: 22 MMOL/L — SIGNIFICANT CHANGE UP (ref 22–31)
CREAT SERPL-MCNC: 0.55 MG/DL — SIGNIFICANT CHANGE UP (ref 0.5–1.3)
EGFR: 97 ML/MIN/1.73M2 — SIGNIFICANT CHANGE UP
GLUCOSE SERPL-MCNC: 122 MG/DL — HIGH (ref 70–99)
HCT VFR BLD CALC: 31.6 % — LOW (ref 39–50)
HGB BLD-MCNC: 9.6 G/DL — LOW (ref 13–17)
MCHC RBC-ENTMCNC: 27.7 PG — SIGNIFICANT CHANGE UP (ref 27–34)
MCHC RBC-ENTMCNC: 30.4 G/DL — LOW (ref 32–36)
MCV RBC AUTO: 91.1 FL — SIGNIFICANT CHANGE UP (ref 80–100)
NRBC # BLD: 0 /100 WBCS — SIGNIFICANT CHANGE UP (ref 0–0)
PLATELET # BLD AUTO: 525 K/UL — HIGH (ref 150–400)
POTASSIUM SERPL-MCNC: 4.3 MMOL/L — SIGNIFICANT CHANGE UP (ref 3.5–5.3)
POTASSIUM SERPL-SCNC: 4.3 MMOL/L — SIGNIFICANT CHANGE UP (ref 3.5–5.3)
RBC # BLD: 3.47 M/UL — LOW (ref 4.2–5.8)
RBC # FLD: 15.4 % — HIGH (ref 10.3–14.5)
SODIUM SERPL-SCNC: 138 MMOL/L — SIGNIFICANT CHANGE UP (ref 135–145)
WBC # BLD: 9.78 K/UL — SIGNIFICANT CHANGE UP (ref 3.8–10.5)
WBC # FLD AUTO: 9.78 K/UL — SIGNIFICANT CHANGE UP (ref 3.8–10.5)

## 2024-12-25 PROCEDURE — 99233 SBSQ HOSP IP/OBS HIGH 50: CPT

## 2024-12-25 RX ADMIN — DABIGATRAN ETEXILATE 150 MILLIGRAM(S): 110 CAPSULE ORAL at 17:45

## 2024-12-25 RX ADMIN — ACETAMINOPHEN 1000 MILLIGRAM(S): 80 SOLUTION/ DROPS ORAL at 13:03

## 2024-12-25 RX ADMIN — Medication 81 MILLIGRAM(S): at 13:03

## 2024-12-25 RX ADMIN — SODIUM CHLORIDE 100 MILLILITER(S): 9 INJECTION, SOLUTION INTRAVENOUS at 06:43

## 2024-12-25 RX ADMIN — ACETAMINOPHEN 1000 MILLIGRAM(S): 80 SOLUTION/ DROPS ORAL at 06:43

## 2024-12-25 RX ADMIN — Medication 1 TABLET(S): at 13:03

## 2024-12-25 RX ADMIN — Medication 2: at 19:01

## 2024-12-25 RX ADMIN — Medication 25 MILLIGRAM(S): at 06:42

## 2024-12-25 RX ADMIN — ACETAMINOPHEN 1000 MILLIGRAM(S): 80 SOLUTION/ DROPS ORAL at 21:43

## 2024-12-25 RX ADMIN — Medication 5 MILLIGRAM(S): at 06:42

## 2024-12-25 RX ADMIN — FAMOTIDINE 20 MILLIGRAM(S): 20 TABLET, FILM COATED ORAL at 13:03

## 2024-12-25 RX ADMIN — LISINOPRIL 20 MILLIGRAM(S): 30 TABLET ORAL at 06:42

## 2024-12-25 RX ADMIN — AMIODARONE HYDROCHLORIDE 100 MILLIGRAM(S): 200 TABLET ORAL at 06:43

## 2024-12-25 RX ADMIN — SENNOSIDES 2 TABLET(S): 8.6 TABLET, FILM COATED ORAL at 21:43

## 2024-12-25 RX ADMIN — Medication 17 GRAM(S): at 06:42

## 2024-12-25 RX ADMIN — DABIGATRAN ETEXILATE 150 MILLIGRAM(S): 110 CAPSULE ORAL at 06:43

## 2024-12-25 NOTE — PROGRESS NOTE ADULT - SUBJECTIVE AND OBJECTIVE BOX
OVERNIGHT EVENTS: NAEO    SUBJECTIVE / INTERVAL HPI: Patient seen and examined at bedside. Patient denying chest pain, SOB, palpitations, cough. Patient denies fever, chills, HA, Dizziness, N/V, abdominal pain, diarrhea, constipation    Remaining ROS negative       PHYSICAL EXAM:  GENERAL: NAD  HEAD:  Atraumatic, Normocephalic  EYES: EOMI, conjunctiva and sclera clear  ENMT: Moist mucous membranes  NECK: Supple, No JVD  NERVOUS SYSTEM:  Alert & Oriented X3, Moves extremities  CHEST/LUNG: Clear to auscultation bilaterally  HEART: regular rhythm  ABDOMEN: Soft, Nontender, Bowel sounds present  EXTREMITIES:  left hip dressing c/d/i; 2+ Peripheral Pulses  PSYCH: Normal mood    VITAL SIGNS:  Vital Signs Last 24 Hrs  T(C): 36.8 (25 Dec 2024 13:00), Max: 36.8 (24 Dec 2024 17:33)  T(F): 98.2 (25 Dec 2024 13:00), Max: 98.2 (24 Dec 2024 17:33)  HR: 82 (25 Dec 2024 13:00) (66 - 84)  BP: 124/86 (25 Dec 2024 13:00) (118/69 - 163/75)  BP(mean): --  RR: 18 (25 Dec 2024 13:00) (18 - 18)  SpO2: 96% (25 Dec 2024 13:00) (95% - 98%)    Parameters below as of 25 Dec 2024 13:00  Patient On (Oxygen Delivery Method): room air          MEDICATIONS:  MEDICATIONS  (STANDING):  acetaminophen     Tablet .. 1000 milliGRAM(s) Oral every 8 hours  aMIOdarone    Tablet 100 milliGRAM(s) Oral daily  amLODIPine   Tablet 5 milliGRAM(s) Oral daily  aspirin enteric coated 81 milliGRAM(s) Oral daily  dabigatran 150 milliGRAM(s) Oral every 12 hours  dextrose 5%. 1000 milliLiter(s) (100 mL/Hr) IV Continuous <Continuous>  dextrose 5%. 1000 milliLiter(s) (50 mL/Hr) IV Continuous <Continuous>  dextrose 50% Injectable 25 Gram(s) IV Push once  dextrose 50% Injectable 12.5 Gram(s) IV Push once  dextrose 50% Injectable 25 Gram(s) IV Push once  famotidine    Tablet 20 milliGRAM(s) Oral daily  glucagon  Injectable 1 milliGRAM(s) IntraMuscular once  HYDROmorphone  Injectable 0.5 milliGRAM(s) IV Push once  insulin lispro (ADMELOG) corrective regimen sliding scale   SubCutaneous Before meals and at bedtime  lactated ringers. 1000 milliLiter(s) (100 mL/Hr) IV Continuous <Continuous>  lisinopril 20 milliGRAM(s) Oral daily  metoprolol succinate ER 25 milliGRAM(s) Oral daily  multivitamin 1 Tablet(s) Oral daily  polyethylene glycol 3350 17 Gram(s) Oral two times a day  senna 2 Tablet(s) Oral at bedtime    MEDICATIONS  (PRN):  benzocaine/menthol Lozenge 1 Lozenge Oral every 6 hours PRN Sore Throat  dextrose Oral Gel 15 Gram(s) Oral once PRN Blood Glucose LESS THAN 70 milliGRAM(s)/deciliter  magnesium hydroxide Suspension 30 milliLiter(s) Oral daily PRN Constipation  ondansetron Injectable 4 milliGRAM(s) IV Push every 6 hours PRN Nausea and/or Vomiting  traMADol 50 milliGRAM(s) Oral every 6 hours PRN Mild Pain (1 - 3)      ALLERGIES:  Allergies    statins (Hepatotoxicity)    Intolerances        LABS:                        9.6    9.78  )-----------( 525      ( 25 Dec 2024 07:41 )             31.6     12-25    138  |  105  |  24[H]  ----------------------------<  122[H]  4.3   |  22  |  0.55    Ca    8.3[L]      25 Dec 2024 07:41        Urinalysis Basic - ( 25 Dec 2024 07:41 )    Color: x / Appearance: x / SG: x / pH: x  Gluc: 122 mg/dL / Ketone: x  / Bili: x / Urobili: x   Blood: x / Protein: x / Nitrite: x   Leuk Esterase: x / RBC: x / WBC x   Sq Epi: x / Non Sq Epi: x / Bacteria: x      CAPILLARY BLOOD GLUCOSE      POCT Blood Glucose.: 155 mg/dL (25 Dec 2024 11:50)      RADIOLOGY & ADDITIONAL TESTS: Reviewed.

## 2024-12-25 NOTE — PROGRESS NOTE ADULT - SUBJECTIVE AND OBJECTIVE BOX
Ortho Note    Pt comfortable without complaints, pain controlled  Denies CP, SOB, N/V, numbness/tingling     Vital Signs Last 24 Hrs  T(C): 36.2 (12-25-24 @ 06:35), Max: 36.2 (12-25-24 @ 06:35)  T(F): 97.2 (12-25-24 @ 06:35), Max: 97.2 (12-25-24 @ 06:35)  HR: 74 (12-25-24 @ 06:35) (74 - 74)  BP: 141/60 (12-25-24 @ 06:35) (141/60 - 141/60)  BP(mean): --  RR: 18 (12-25-24 @ 06:35) (18 - 18)  SpO2: 96% (12-25-24 @ 06:35) (96% - 96%)  I&O's Summary    24 Dec 2024 07:01  -  25 Dec 2024 07:00  --------------------------------------------------------  IN: 2400 mL / OUT: 1900 mL / NET: 500 mL        General: Pt Alert and oriented, NAD  DSG C/D/I  Pulses:  Sensation:  Motor: Quad/Ham/EHL/FHL/TA/GS                          9.6    9.78  )-----------( 525      ( 25 Dec 2024 07:41 )             31.6     12-25    138  |  105  |  24[H]  ----------------------------<  122[H]  4.3   |  22  |  0.55    Ca    8.3[L]      25 Dec 2024 07:41    Physical Exam:  General: NAD, resting comfortably in bed    84yM s/p Left YO by Dr. SATISH Echeverria on 12-17  - Weight Bearing Status: Protected Weight Bearing, no active abduction of left hip, no abduction pillow  - Pain control  - DVT PPx: Pradaxa, ASA  - F/u AM labs  -Pt progress:2 person assist   Dispo: [facility] -> HPT bc no medicare days  Ortho Pager [phone]

## 2024-12-25 NOTE — PROGRESS NOTE ADULT - ASSESSMENT
Mr. Zuhair Mark is an 84/M with CAD, Atrial fibrillation on Pradaxa, CVA (?), DM type 2, HTN and osteoarthritis who presented with chronic left hip pain x 6 months that failed conservative management and admitted for an elective YO by Dr. Echeverria.          #Left hip osteoarthritis   #Post op state  - pain controlled on Tylenol 1g q8h and PRN Tramadol  - Provide adequate analgesia, Incentive spirometry, mobilize with fall precautions, bowel regimen, DVT prophylaxis  - PT/OT    #Constipation  - abdomen Xray no obstruction nor free air  - BM reported by the patient   - mobilize  - bowel regimen with Miralax BID and Senna once at night    #Acute blood loss anemia  - Monitor Hgb; no reported bleeding symptoms   - Transfuse for Hgb < 7  - Ensure Type and Screen available  - can send iron, TIBC, ferritin, B12, retic    #Urinary retention  - Lee catheter in place  - urology following  - ensure adequate bowel regimen in place    #Leucocytosis, resolved  - likely reactive  - afebrile and denies symptoms of infection  - monitor temp and WBC trend    #CAD  #Atrial fibrillation  - rate controlled on Metoprolol, Amiodarone  - on Pradaxa  - chest pain free    #HTN  - on Metoprolol, Amlodipine and Lisinopril  - ensure BP parameters in place    #DM type 2  - A1C 7, controlled  - on correctional insulin; goal glucose 100-180 mg/dL  - can resume Metformin  - DM diet    DVT ppx: Dabigatran    Feel free to reach out for any questions. Recommendations discussed with primary team.

## 2024-12-26 LAB
ANION GAP SERPL CALC-SCNC: 8 MMOL/L — SIGNIFICANT CHANGE UP (ref 5–17)
BUN SERPL-MCNC: 20 MG/DL — SIGNIFICANT CHANGE UP (ref 7–23)
CALCIUM SERPL-MCNC: 8.1 MG/DL — LOW (ref 8.4–10.5)
CHLORIDE SERPL-SCNC: 105 MMOL/L — SIGNIFICANT CHANGE UP (ref 96–108)
CO2 SERPL-SCNC: 22 MMOL/L — SIGNIFICANT CHANGE UP (ref 22–31)
CREAT SERPL-MCNC: 0.56 MG/DL — SIGNIFICANT CHANGE UP (ref 0.5–1.3)
EGFR: 96 ML/MIN/1.73M2 — SIGNIFICANT CHANGE UP
GLUCOSE SERPL-MCNC: 110 MG/DL — HIGH (ref 70–99)
HCT VFR BLD CALC: 33.2 % — LOW (ref 39–50)
HGB BLD-MCNC: 10 G/DL — LOW (ref 13–17)
MCHC RBC-ENTMCNC: 27.9 PG — SIGNIFICANT CHANGE UP (ref 27–34)
MCHC RBC-ENTMCNC: 30.1 G/DL — LOW (ref 32–36)
MCV RBC AUTO: 92.5 FL — SIGNIFICANT CHANGE UP (ref 80–100)
NRBC # BLD: 0 /100 WBCS — SIGNIFICANT CHANGE UP (ref 0–0)
PLATELET # BLD AUTO: 465 K/UL — HIGH (ref 150–400)
POTASSIUM SERPL-MCNC: 4.1 MMOL/L — SIGNIFICANT CHANGE UP (ref 3.5–5.3)
POTASSIUM SERPL-SCNC: 4.1 MMOL/L — SIGNIFICANT CHANGE UP (ref 3.5–5.3)
RBC # BLD: 3.59 M/UL — LOW (ref 4.2–5.8)
RBC # FLD: 15.4 % — HIGH (ref 10.3–14.5)
SODIUM SERPL-SCNC: 135 MMOL/L — SIGNIFICANT CHANGE UP (ref 135–145)
WBC # BLD: 9.89 K/UL — SIGNIFICANT CHANGE UP (ref 3.8–10.5)
WBC # FLD AUTO: 9.89 K/UL — SIGNIFICANT CHANGE UP (ref 3.8–10.5)

## 2024-12-26 PROCEDURE — 99233 SBSQ HOSP IP/OBS HIGH 50: CPT

## 2024-12-26 RX ORDER — ALBUTEROL SULFATE 90 UG/1
1 INHALANT RESPIRATORY (INHALATION) THREE TIMES A DAY
Refills: 0 | Status: DISCONTINUED | OUTPATIENT
Start: 2024-12-26 | End: 2025-01-08

## 2024-12-26 RX ADMIN — Medication 17 GRAM(S): at 06:12

## 2024-12-26 RX ADMIN — ACETAMINOPHEN 1000 MILLIGRAM(S): 80 SOLUTION/ DROPS ORAL at 21:54

## 2024-12-26 RX ADMIN — DABIGATRAN ETEXILATE 150 MILLIGRAM(S): 110 CAPSULE ORAL at 18:17

## 2024-12-26 RX ADMIN — Medication 1 TABLET(S): at 13:22

## 2024-12-26 RX ADMIN — ACETAMINOPHEN 1000 MILLIGRAM(S): 80 SOLUTION/ DROPS ORAL at 13:21

## 2024-12-26 RX ADMIN — DABIGATRAN ETEXILATE 150 MILLIGRAM(S): 110 CAPSULE ORAL at 06:11

## 2024-12-26 RX ADMIN — Medication 81 MILLIGRAM(S): at 13:21

## 2024-12-26 RX ADMIN — LISINOPRIL 20 MILLIGRAM(S): 30 TABLET ORAL at 06:12

## 2024-12-26 RX ADMIN — FAMOTIDINE 20 MILLIGRAM(S): 20 TABLET, FILM COATED ORAL at 13:22

## 2024-12-26 RX ADMIN — ACETAMINOPHEN 1000 MILLIGRAM(S): 80 SOLUTION/ DROPS ORAL at 06:11

## 2024-12-26 RX ADMIN — SODIUM CHLORIDE 100 MILLILITER(S): 9 INJECTION, SOLUTION INTRAVENOUS at 06:15

## 2024-12-26 RX ADMIN — Medication 5 MILLIGRAM(S): at 06:12

## 2024-12-26 RX ADMIN — Medication 17 GRAM(S): at 18:17

## 2024-12-26 RX ADMIN — Medication 25 MILLIGRAM(S): at 06:11

## 2024-12-26 RX ADMIN — SENNOSIDES 2 TABLET(S): 8.6 TABLET, FILM COATED ORAL at 21:54

## 2024-12-26 RX ADMIN — AMIODARONE HYDROCHLORIDE 100 MILLIGRAM(S): 200 TABLET ORAL at 06:11

## 2024-12-26 NOTE — PROGRESS NOTE ADULT - ASSESSMENT
Mr. Zuhair Mark is an 84/M with CAD, Atrial fibrillation on Pradaxa, CVA (?), DM type 2, HTN and osteoarthritis who presented with chronic left hip pain x 6 months that failed conservative management and admitted for an elective YO by Dr. Echeverria.    #Left hip osteoarthritis   #Post op state  - pain controlled on Tylenol 1g q8h and PRN Tramadol  - Provide adequate analgesia, Incentive spirometry, mobilize with fall precautions, bowel regimen, DVT prophylaxis  - PT/OT    #Constipation  - abdomen Xray no obstruction nor free air  - BM reported by the patient   - mobilize  - bowel regimen with Miralax BID and Senna once at night    #Acute blood loss anemia  - Monitor Hgb; no reported bleeding symptoms   - Transfuse for Hgb < 7  - Ensure Type and Screen available    #Urinary retention  - Lee catheter in place, TOV  - ensure adequate bowel regimen in place    #Leucocytosis, resolved  - likely reactive  - afebrile and denies symptoms of infection  - monitor temp and WBC trend    #CAD  #Atrial fibrillation  - rate controlled on Metoprolol, Amiodarone  - on Pradaxa  - chest pain free    #HTN  - on Metoprolol, Amlodipine and Lisinopril  - ensure BP parameters in place    #DM type 2  - A1C 7, controlled  - on correctional insulin; goal glucose 100-180 mg/dL  - can resume Metformin  - DM diet    DVT ppx: Dabigatran    Feel free to reach out for any questions. Recommendations discussed with primary team.

## 2024-12-26 NOTE — PROGRESS NOTE ADULT - SUBJECTIVE AND OBJECTIVE BOX
OVERNIGHT EVENTS: NAEO    SUBJECTIVE / INTERVAL HPI: Patient seen and examined at bedside. Patient denying chest pain, SOB, palpitations, cough. Patient denies fever, chills, HA, Dizziness, N/V, abdominal pain, diarrhea. No acute complaints.     Remaining ROS negative       PHYSICAL EXAM:    General:NAD.   HEENT: NC/AT; PERRL, anicteric sclera; MMM  Neck: supple  Cardiovascular: +S1/S2, RRR  Respiratory: CTA B/L; no W/R/R  Gastrointestinal: soft, NT/ND; +BSx4  Extremities: WWP; no edema, clubbing or cyanosis  Vascular: 2+ radial, DP/PT pulses B/L  Neurological: AAOx3; no focal deficits  Psychiatric: pleasant mood and affect  Dermatologic: no appreciable wounds or damage to the skin    VITAL SIGNS:  Vital Signs Last 24 Hrs  T(C): 36.8 (26 Dec 2024 10:00), Max: 36.8 (26 Dec 2024 10:00)  T(F): 98.2 (26 Dec 2024 10:00), Max: 98.2 (26 Dec 2024 10:00)  HR: 74 (26 Dec 2024 10:00) (64 - 78)  BP: 140/78 (26 Dec 2024 10:00) (131/79 - 146/82)  BP(mean): --  RR: 18 (26 Dec 2024 10:00) (17 - 18)  SpO2: 97% (26 Dec 2024 10:00) (96% - 98%)    Parameters below as of 26 Dec 2024 10:00  Patient On (Oxygen Delivery Method): room air          MEDICATIONS:  MEDICATIONS  (STANDING):  acetaminophen     Tablet .. 1000 milliGRAM(s) Oral every 8 hours  aMIOdarone    Tablet 100 milliGRAM(s) Oral daily  amLODIPine   Tablet 5 milliGRAM(s) Oral daily  aspirin enteric coated 81 milliGRAM(s) Oral daily  dabigatran 150 milliGRAM(s) Oral every 12 hours  dextrose 5%. 1000 milliLiter(s) (50 mL/Hr) IV Continuous <Continuous>  dextrose 5%. 1000 milliLiter(s) (100 mL/Hr) IV Continuous <Continuous>  dextrose 50% Injectable 25 Gram(s) IV Push once  dextrose 50% Injectable 12.5 Gram(s) IV Push once  dextrose 50% Injectable 25 Gram(s) IV Push once  famotidine    Tablet 20 milliGRAM(s) Oral daily  glucagon  Injectable 1 milliGRAM(s) IntraMuscular once  HYDROmorphone  Injectable 0.5 milliGRAM(s) IV Push once  insulin lispro (ADMELOG) corrective regimen sliding scale   SubCutaneous Before meals and at bedtime  lactated ringers. 1000 milliLiter(s) (100 mL/Hr) IV Continuous <Continuous>  lisinopril 20 milliGRAM(s) Oral daily  metoprolol succinate ER 25 milliGRAM(s) Oral daily  multivitamin 1 Tablet(s) Oral daily  polyethylene glycol 3350 17 Gram(s) Oral two times a day  senna 2 Tablet(s) Oral at bedtime    MEDICATIONS  (PRN):  albuterol    90 MICROgram(s) HFA Inhaler 1 Puff(s) Inhalation three times a day PRN Wheezing  benzocaine/menthol Lozenge 1 Lozenge Oral every 6 hours PRN Sore Throat  dextrose Oral Gel 15 Gram(s) Oral once PRN Blood Glucose LESS THAN 70 milliGRAM(s)/deciliter  magnesium hydroxide Suspension 30 milliLiter(s) Oral daily PRN Constipation  ondansetron Injectable 4 milliGRAM(s) IV Push every 6 hours PRN Nausea and/or Vomiting  traMADol 50 milliGRAM(s) Oral every 6 hours PRN Mild Pain (1 - 3)      ALLERGIES:  Allergies    statins (Hepatotoxicity)    Intolerances        LABS:                        10.0   9.89  )-----------( 465      ( 26 Dec 2024 05:30 )             33.2     12-26    135  |  105  |  20  ----------------------------<  110[H]  4.1   |  22  |  0.56    Ca    8.1[L]      26 Dec 2024 05:30        Urinalysis Basic - ( 26 Dec 2024 05:30 )    Color: x / Appearance: x / SG: x / pH: x  Gluc: 110 mg/dL / Ketone: x  / Bili: x / Urobili: x   Blood: x / Protein: x / Nitrite: x   Leuk Esterase: x / RBC: x / WBC x   Sq Epi: x / Non Sq Epi: x / Bacteria: x      CAPILLARY BLOOD GLUCOSE      POCT Blood Glucose.: 175 mg/dL (26 Dec 2024 11:47)      RADIOLOGY & ADDITIONAL TESTS: Reviewed.

## 2024-12-26 NOTE — PROGRESS NOTE ADULT - SUBJECTIVE AND OBJECTIVE BOX
Ortho Note    Pt comfortable without complaints, pain controlled  Denies CP, SOB, N/V, numbness/tingling     Vital Signs Last 24 Hrs  T(C): --  T(F): --  HR: --  BP: --  BP(mean): --  RR: --  SpO2: --  I&O's Summary    25 Dec 2024 07:01  -  26 Dec 2024 07:00  --------------------------------------------------------  IN: 1200 mL / OUT: 650 mL / NET: 550 mL        General: Pt Alert and oriented, NAD  DSG C/D/I  Pulses:  Sensation:  Motor: Quad/Ham/EHL/FHL/TA/GS                          10.0   9.89  )-----------( 465      ( 26 Dec 2024 05:30 )             33.2     12-26    135  |  105  |  20  ----------------------------<  110[H]  4.1   |  22  |  0.56    Ca    8.1[L]      26 Dec 2024 05:30      84yM s/p Left YO by Dr. SATISH Echeverria on 12-17  - Weight Bearing Status: Protected Weight Bearing, no active abduction of left hip, no abduction pillow  - Pain control  - DVT PPx: Pradaxa, ASA  - F/u AM labs  -Pt progress:2 person assist   Dispo: [facility] -> HPT bc no medicare days  Ortho Pager [phone]

## 2024-12-27 PROCEDURE — 99233 SBSQ HOSP IP/OBS HIGH 50: CPT

## 2024-12-27 RX ADMIN — ACETAMINOPHEN 1000 MILLIGRAM(S): 80 SOLUTION/ DROPS ORAL at 05:09

## 2024-12-27 RX ADMIN — Medication 2: at 13:13

## 2024-12-27 RX ADMIN — Medication 81 MILLIGRAM(S): at 13:15

## 2024-12-27 RX ADMIN — Medication 17 GRAM(S): at 05:09

## 2024-12-27 RX ADMIN — SENNOSIDES 2 TABLET(S): 8.6 TABLET, FILM COATED ORAL at 23:05

## 2024-12-27 RX ADMIN — ACETAMINOPHEN 1000 MILLIGRAM(S): 80 SOLUTION/ DROPS ORAL at 23:05

## 2024-12-27 RX ADMIN — FAMOTIDINE 20 MILLIGRAM(S): 20 TABLET, FILM COATED ORAL at 13:14

## 2024-12-27 RX ADMIN — Medication 2: at 07:43

## 2024-12-27 RX ADMIN — Medication 17 GRAM(S): at 17:47

## 2024-12-27 RX ADMIN — LISINOPRIL 20 MILLIGRAM(S): 30 TABLET ORAL at 05:08

## 2024-12-27 RX ADMIN — Medication 4: at 23:05

## 2024-12-27 RX ADMIN — DABIGATRAN ETEXILATE 150 MILLIGRAM(S): 110 CAPSULE ORAL at 17:47

## 2024-12-27 RX ADMIN — ACETAMINOPHEN 1000 MILLIGRAM(S): 80 SOLUTION/ DROPS ORAL at 13:14

## 2024-12-27 RX ADMIN — AMIODARONE HYDROCHLORIDE 100 MILLIGRAM(S): 200 TABLET ORAL at 05:08

## 2024-12-27 RX ADMIN — Medication 25 MILLIGRAM(S): at 05:09

## 2024-12-27 RX ADMIN — DABIGATRAN ETEXILATE 150 MILLIGRAM(S): 110 CAPSULE ORAL at 05:08

## 2024-12-27 RX ADMIN — Medication 1 TABLET(S): at 13:15

## 2024-12-27 RX ADMIN — Medication 5 MILLIGRAM(S): at 05:09

## 2024-12-27 NOTE — PROGRESS NOTE ADULT - SUBJECTIVE AND OBJECTIVE BOX
Ortho Note    Subjective:  Pt comfortable without complaints, pain controlled with current pain medication regimen  Denies CP, SOB, N/V, numbness/tingling   Reviewed plan of care with patient at bedside        Vital Signs Last 24 Hrs  T(C): 36.4 (12-27-24 @ 14:32), Max: 36.4 (12-27-24 @ 14:32)  T(F): 97.6 (12-27-24 @ 14:32), Max: 97.6 (12-27-24 @ 14:32)  HR: 74 (12-27-24 @ 14:32) (74 - 77)  BP: 124/75 (12-27-24 @ 14:32) (113/60 - 124/75)  BP(mean): --  RR: 20 (12-27-24 @ 14:32) (20 - 20)  SpO2: 91% (12-27-24 @ 14:32) (91% - 95%)  AVSS    Objective:    Physical Exam:  General: Pt Alert and oriented, NAD  LLE DSG C/D/I  Pulses: 2+ DP   Sensation: silt sural/saph/dpn/spn/tib   Motor: Quad/Ham/EHL/FHL/TA/GS 5/5 in strength   juarez      84yM s/p Left YO by Dr. SATISH Echeverria on 12-17  - afebrile, wbcs 9.89  - Weight Bearing Status: Protected Weight Bearing, no active abduction of left hip, no abduction pillow  - Pain control- tylenol 1000mg PO Q8h, tramadol 50mg PO Q6th prn mild pain   - DVT PPx: Pradaxa 150mg PO Q12h, ASA 81mg PO daily   - appreciate medicine recs   -Pt progress:1 person assist   - bowel regimen, IS use, PPI   - continue juarez - consider DC 12-28 when patient can stand and ambulate better with PT   Dispo: out of pocket [facility] vs HPT     Ortho Pager [phone]

## 2024-12-27 NOTE — CHART NOTE - NSCHARTNOTEFT_GEN_A_CORE
Admitting Diagnosis:   Patient is a 84y old  Male who presents with a chief complaint of left hip pain (27 Dec 2024 12:24)      PAST MEDICAL & SURGICAL HISTORY:  Renal artery stenosis      Afib      HTN (hypertension)      Hepatitis      DM (diabetes mellitus)      Intermittent claudication      CVA (cerebral vascular accident)  2014      Osteoarthritis      History of tonsillectomy      History of femoral angiogram  with stent placement      H/O eye surgery  b/l cataracts      Current Nutrition Order:  Diet, Consistent Carbohydrate/No Snacks:   Supplement Feeding Modality:  Oral  Ensure Max Cans or Servings Per Day:  1       Frequency:  Three Times a day (12-20-24 @ 13:26) [Active]     PO Intake: Good (%) [ x  ]  Fair (50-75%) [   ] Poor (<25%) [   ]    GI Issues: WNL as per flowsheet, denies nausea/ vomiting/ constipation/ diarrhea, reports last bowel movement last night     Pain: 0/10 as per flowsheet    Skin Integrity: surgical incisions; stage I pressure injury of sacrum documented, dario 16; no edema documented.         12-26-24 @ 07:01  -  12-27-24 @ 07:00  --------------------------------------------------------  IN: 0 mL / OUT: 800 mL / NET: -800 mL    12-27-24 @ 07:01  -  12-27-24 @ 15:46  --------------------------------------------------------  IN: 0 mL / OUT: 500 mL / NET: -500 mL        Labs:   12-26    135  |  105  |  20  ----------------------------<  110[H]  4.1   |  22  |  0.56    Ca    8.1[L]      26 Dec 2024 05:30      CAPILLARY BLOOD GLUCOSE  POCT Blood Glucose.: 181 mg/dL (27 Dec 2024 12:31)  POCT Blood Glucose.: 164 mg/dL (27 Dec 2024 07:36)  POCT Blood Glucose.: 150 mg/dL (26 Dec 2024 21:34)  POCT Blood Glucose.: 119 mg/dL (26 Dec 2024 17:08)      Medications:  MEDICATIONS  (STANDING):  acetaminophen     Tablet .. 1000 milliGRAM(s) Oral every 8 hours  aMIOdarone    Tablet 100 milliGRAM(s) Oral daily  amLODIPine   Tablet 5 milliGRAM(s) Oral daily  aspirin enteric coated 81 milliGRAM(s) Oral daily  dabigatran 150 milliGRAM(s) Oral every 12 hours  dextrose 5%. 1000 milliLiter(s) (100 mL/Hr) IV Continuous <Continuous>  dextrose 5%. 1000 milliLiter(s) (50 mL/Hr) IV Continuous <Continuous>  dextrose 50% Injectable 25 Gram(s) IV Push once  dextrose 50% Injectable 12.5 Gram(s) IV Push once  dextrose 50% Injectable 25 Gram(s) IV Push once  famotidine    Tablet 20 milliGRAM(s) Oral daily  glucagon  Injectable 1 milliGRAM(s) IntraMuscular once  HYDROmorphone  Injectable 0.5 milliGRAM(s) IV Push once  insulin lispro (ADMELOG) corrective regimen sliding scale   SubCutaneous Before meals and at bedtime  lactated ringers. 1000 milliLiter(s) (100 mL/Hr) IV Continuous <Continuous>  lisinopril 20 milliGRAM(s) Oral daily  metoprolol succinate ER 25 milliGRAM(s) Oral daily  multivitamin 1 Tablet(s) Oral daily  polyethylene glycol 3350 17 Gram(s) Oral two times a day  senna 2 Tablet(s) Oral at bedtime    MEDICATIONS  (PRN):  albuterol    90 MICROgram(s) HFA Inhaler 1 Puff(s) Inhalation three times a day PRN Wheezing  benzocaine/menthol Lozenge 1 Lozenge Oral every 6 hours PRN Sore Throat  dextrose Oral Gel 15 Gram(s) Oral once PRN Blood Glucose LESS THAN 70 milliGRAM(s)/deciliter  magnesium hydroxide Suspension 30 milliLiter(s) Oral daily PRN Constipation  ondansetron Injectable 4 milliGRAM(s) IV Push every 6 hours PRN Nausea and/or Vomiting  traMADol 50 milliGRAM(s) Oral every 6 hours PRN Mild Pain (1 - 3)      Height for BMI (FEET)	5 Feet  Height for BMI (INCHES)	8 Inch(s)  Height for BMI (CENTIMETERS)	172.72 Centimeter(s)  Weight for BMI (lbs)	141 lb  Weight for BMI (kg)	64 kg  Body Mass Index	21.4  Ideal body weight 154 lb / 70 kg (92%)    Weight Change: No new wt obtained since 12/17, recommend weekly wt to track / trend wt changes.     Estimated energy needs:   Estimated Energy Needs Weight (lbs)	141 lb  Estimated Energy Needs Weight (kg)	63.9 kg  Estimated Energy Needs From (duyen/kg)	25  Estimated Energy Needs To (dueyn/kg)	30  Estimated Energy Needs Calculated From (duyen/kg)	1597  Estimated Energy Needs Calculated To (duyen/kg)	1917    Estimated Protein Needs Weight (lbs)	141 lb  Estimated Protein Needs Weight (kg)	63.9 kg  Estimated Protein Needs From (g/kg)	1  Estimated Protein Needs To (g/kg)	1.3  Estimated Protein Needs Calculated From (g/kg)	63.9  Estimated Protein Needs Calculated To (g/kg)	83.07    Estimated Fluid Needs Weight (lbs)	141 lb  Estimated Fluid Needs Weight (kg)	63.9 kg  Estimated Fluid Needs From (ml/kg)	30  Estimated Fluid Needs To (ml/kg)	35  Estimated Fluid Needs Calculated From (ml/kg)	1917  Estimated Fluid Needs Calculated To (ml/kg)	2236    Other Calculations:	Based on dosing wt 141 pounds as pt within % ideal body weight (92%). Needs adjusted for post-op healing, advanced age.    Subjective: "84yoM with left hip pain x 6 months without inciting accident or injury. Patient endorses pain with movement. Denies prior surgeries to the hip or injection. He takes Tylenol at home for pain with some relief of symptoms. Pain persists despite conservative measures. Ambulates via wheelchair, occasionally uses a walker."     Patient seen at bedside on 9WO for nutrition follow up. Current diet order: consistent carb + Ensure Max 3x/day (provides 150 kcal, 30 g protein/ shake). Reports excellent appetite, endorses consuming 100% of breakfast this morning of pancakes, scrambled eggs, and avocado, also endorses continuing to consume 3 meals/day and consuming % of each meal, likely meeting >/= 75% estimated nutrient needs at this time. Reports coughing persists but states he does not believe it is related to food/ liquid intake as he denies difficulty swallowing and states that he coughs randomly throughout the day. Denies nausea/vomiting/diarrhea/constipation, reports last bowel movement last night (on bowel regimen). Labs: POCTs slightly elevated (181, 164, 150, 119, 175 mg/dL...), serum glucose level from yesterday 110 mg/dL (WNL) - on sliding scale insulin. Meds reviewed as above. RD will continue to follow, see nutrition recommendations below.     Previous Nutrition Diagnosis: Increased nutrient needs... protein related to physiological demands as evidenced by status post total hip replacement.     Active [ x  ]  Resolved [   ]    Goal: Pt will consume >/= 75% of estimated nutrient needs     Recommendations:  1. Continue current diet order + Ensure Max 3x/day to optimize nutritional needs (provides 150 kcal, 30 g protein/ shake)   2. Encourage and monitor PO intake, honor preferences as able   >> Consistently meet >75% of estimated needs during admission   3. Monitor wt trends, GI function, skin integrity  4. Monitor lytes, renal indices, blood glucose, LFTs    5. Pain and bowel regimen per team  6. Continue with MVI daily to meet 100% RDA needs   RD will continue to monitor PO intake, labs, hydration, and wt prn.    Education: RD continued to encourage PO intake during meals & reviewed importance, emphasized high-protein foods with each meal, pt receptive.     Risk Level: High [   ] Moderate [  x ] Low [   ]

## 2024-12-27 NOTE — PROGRESS NOTE ADULT - SUBJECTIVE AND OBJECTIVE BOX
OVERNIGHT EVENTS: NAEO    SUBJECTIVE / INTERVAL HPI: Patient seen and examined at bedside. Patient denying chest pain, SOB, palpitations, cough. Patient denies fever, chills, HA, Dizziness, N/V. No acute complaints today.     Remaining ROS negative       PHYSICAL EXAM:    General:NAD.   HEENT: NC/AT; PERRL, anicteric sclera; MMM  Neck: supple  Cardiovascular: +S1/S2, RRR  Respiratory: CTA B/L; no W/R/R  Gastrointestinal: soft, NT/ND; +BSx4  Extremities: WWP; no edema, clubbing or cyanosis  Vascular: 2+ radial, DP/PT pulses B/L  Neurological: AAOx3; no focal deficits  Psychiatric: pleasant mood and affect  Dermatologic: no appreciable wounds or damage to the skin    VITAL SIGNS:  Vital Signs Last 24 Hrs  T(C): 36.1 (27 Dec 2024 08:36), Max: 36.7 (26 Dec 2024 14:39)  T(F): 97 (27 Dec 2024 08:36), Max: 98.1 (26 Dec 2024 14:39)  HR: 77 (27 Dec 2024 11:56) (67 - 95)  BP: 121/63 (27 Dec 2024 11:56) (113/60 - 144/73)  BP(mean): --  RR: 19 (27 Dec 2024 08:36) (16 - 19)  SpO2: 95% (27 Dec 2024 11:56) (94% - 100%)    Parameters below as of 27 Dec 2024 11:56  Patient On (Oxygen Delivery Method): room air          MEDICATIONS:  MEDICATIONS  (STANDING):  acetaminophen     Tablet .. 1000 milliGRAM(s) Oral every 8 hours  aMIOdarone    Tablet 100 milliGRAM(s) Oral daily  amLODIPine   Tablet 5 milliGRAM(s) Oral daily  aspirin enteric coated 81 milliGRAM(s) Oral daily  dabigatran 150 milliGRAM(s) Oral every 12 hours  dextrose 5%. 1000 milliLiter(s) (50 mL/Hr) IV Continuous <Continuous>  dextrose 5%. 1000 milliLiter(s) (100 mL/Hr) IV Continuous <Continuous>  dextrose 50% Injectable 25 Gram(s) IV Push once  dextrose 50% Injectable 12.5 Gram(s) IV Push once  dextrose 50% Injectable 25 Gram(s) IV Push once  famotidine    Tablet 20 milliGRAM(s) Oral daily  glucagon  Injectable 1 milliGRAM(s) IntraMuscular once  HYDROmorphone  Injectable 0.5 milliGRAM(s) IV Push once  insulin lispro (ADMELOG) corrective regimen sliding scale   SubCutaneous Before meals and at bedtime  lactated ringers. 1000 milliLiter(s) (100 mL/Hr) IV Continuous <Continuous>  lisinopril 20 milliGRAM(s) Oral daily  metoprolol succinate ER 25 milliGRAM(s) Oral daily  multivitamin 1 Tablet(s) Oral daily  polyethylene glycol 3350 17 Gram(s) Oral two times a day  senna 2 Tablet(s) Oral at bedtime    MEDICATIONS  (PRN):  albuterol    90 MICROgram(s) HFA Inhaler 1 Puff(s) Inhalation three times a day PRN Wheezing  benzocaine/menthol Lozenge 1 Lozenge Oral every 6 hours PRN Sore Throat  dextrose Oral Gel 15 Gram(s) Oral once PRN Blood Glucose LESS THAN 70 milliGRAM(s)/deciliter  magnesium hydroxide Suspension 30 milliLiter(s) Oral daily PRN Constipation  ondansetron Injectable 4 milliGRAM(s) IV Push every 6 hours PRN Nausea and/or Vomiting  traMADol 50 milliGRAM(s) Oral every 6 hours PRN Mild Pain (1 - 3)      ALLERGIES:  Allergies    statins (Hepatotoxicity)    Intolerances        LABS:                        10.0   9.89  )-----------( 465      ( 26 Dec 2024 05:30 )             33.2     12-26    135  |  105  |  20  ----------------------------<  110[H]  4.1   |  22  |  0.56    Ca    8.1[L]      26 Dec 2024 05:30        Urinalysis Basic - ( 26 Dec 2024 05:30 )    Color: x / Appearance: x / SG: x / pH: x  Gluc: 110 mg/dL / Ketone: x  / Bili: x / Urobili: x   Blood: x / Protein: x / Nitrite: x   Leuk Esterase: x / RBC: x / WBC x   Sq Epi: x / Non Sq Epi: x / Bacteria: x      CAPILLARY BLOOD GLUCOSE      POCT Blood Glucose.: 164 mg/dL (27 Dec 2024 07:36)      RADIOLOGY & ADDITIONAL TESTS: Reviewed.

## 2024-12-27 NOTE — PROGRESS NOTE ADULT - SUBJECTIVE AND OBJECTIVE BOX
Ortho Note    Pt comfortable without complaints, pain controlled  Denies CP, SOB, N/V, numbness/tingling     Vital Signs Last 24 Hrs  T(C): 36.7 (27 Dec 2024 05:00), Max: 36.8 (26 Dec 2024 10:00)  T(F): 98 (27 Dec 2024 05:00), Max: 98.2 (26 Dec 2024 10:00)  HR: 69 (27 Dec 2024 05:00) (67 - 95)  BP: 134/53 (27 Dec 2024 05:00) (125/60 - 140/78)  BP(mean): --  RR: 18 (27 Dec 2024 05:00) (16 - 19)  SpO2: 96% (27 Dec 2024 05:00) (96% - 100%)    Parameters below as of 27 Dec 2024 05:00  Patient On (Oxygen Delivery Method): room air      General: Pt Alert and oriented, NAD  LLE   DSG C/D/I  Pulses: 2+ DP   Sensation: silt sural/saph/dpn/spn/tib   Motor: Quad/Ham/EHL/FHL/TA/GS 5/5 in strength                    84yM s/p Left YO by Dr. SATISH Echeverria on 12-17  - Weight Bearing Status: Protected Weight Bearing, no active abduction of left hip, no abduction pillow  - Pain control  - DVT PPx: Pradaxa, ASA  - F/u AM labs  -Pt progress:2 person assist   Dispo: out of pocket [facility] vs [facility]PT     Ortho Pager [phone]

## 2024-12-27 NOTE — PROGRESS NOTE ADULT - ASSESSMENT
Mr. Zuhair Mark is an 84/M with CAD, Atrial fibrillation on Pradaxa, CVA (?), DM type 2, HTN and osteoarthritis who presented with chronic left hip pain x 6 months that failed conservative management and admitted for an elective YO by Dr. Echeverria.    #Left hip osteoarthritis   #Post op state  - pain controlled on Tylenol 1g q8h and PRN Tramadol  - Provide adequate analgesia, Incentive spirometry, mobilize with fall precautions, bowel regimen, DVT prophylaxis  - PT/OT    #Constipation  - abdomen Xray no obstruction nor free air  - BM reported by the patient   - mobilize  - bowel regimen with Miralax BID and Senna once at night    #Acute blood loss anemia  - Monitor Hgb; no reported bleeding symptoms   - Transfuse for Hgb < 7  - Ensure Type and Screen available    #Urinary retention  - Lee catheter in place, TOV  - ensure adequate bowel regimen in place    #Leucocytosis, resolved  - likely reactive  - afebrile and denies symptoms of infection  - monitor temp and WBC trend    #CAD  #Atrial fibrillation  - rate controlled on Metoprolol, Amiodarone  - on Pradaxa      #HTN  - on Metoprolol, Amlodipine and Lisinopril  - ensure BP parameters in place    #DM type 2  - A1C 7, controlled  - on correctional insulin; goal glucose 100-180 mg/dL  - can resume Metformin  - DM diet    DVT ppx: Dabigatran    Feel free to reach out for any questions. Recommendations discussed with primary team.

## 2024-12-28 LAB
ANION GAP SERPL CALC-SCNC: 8 MMOL/L — SIGNIFICANT CHANGE UP (ref 5–17)
BUN SERPL-MCNC: 20 MG/DL — SIGNIFICANT CHANGE UP (ref 7–23)
CALCIUM SERPL-MCNC: 8.1 MG/DL — LOW (ref 8.4–10.5)
CHLORIDE SERPL-SCNC: 105 MMOL/L — SIGNIFICANT CHANGE UP (ref 96–108)
CO2 SERPL-SCNC: 21 MMOL/L — LOW (ref 22–31)
CREAT SERPL-MCNC: 0.66 MG/DL — SIGNIFICANT CHANGE UP (ref 0.5–1.3)
EGFR: 91 ML/MIN/1.73M2 — SIGNIFICANT CHANGE UP
GLUCOSE SERPL-MCNC: 96 MG/DL — SIGNIFICANT CHANGE UP (ref 70–99)
HCT VFR BLD CALC: 30.4 % — LOW (ref 39–50)
HGB BLD-MCNC: 9.3 G/DL — LOW (ref 13–17)
MCHC RBC-ENTMCNC: 28 PG — SIGNIFICANT CHANGE UP (ref 27–34)
MCHC RBC-ENTMCNC: 30.6 G/DL — LOW (ref 32–36)
MCV RBC AUTO: 91.6 FL — SIGNIFICANT CHANGE UP (ref 80–100)
NRBC # BLD: 0 /100 WBCS — SIGNIFICANT CHANGE UP (ref 0–0)
PLATELET # BLD AUTO: 540 K/UL — HIGH (ref 150–400)
POTASSIUM SERPL-MCNC: 4.2 MMOL/L — SIGNIFICANT CHANGE UP (ref 3.5–5.3)
POTASSIUM SERPL-SCNC: 4.2 MMOL/L — SIGNIFICANT CHANGE UP (ref 3.5–5.3)
RBC # BLD: 3.32 M/UL — LOW (ref 4.2–5.8)
RBC # FLD: 15.7 % — HIGH (ref 10.3–14.5)
SODIUM SERPL-SCNC: 134 MMOL/L — LOW (ref 135–145)
WBC # BLD: 9.94 K/UL — SIGNIFICANT CHANGE UP (ref 3.8–10.5)
WBC # FLD AUTO: 9.94 K/UL — SIGNIFICANT CHANGE UP (ref 3.8–10.5)

## 2024-12-28 PROCEDURE — 99233 SBSQ HOSP IP/OBS HIGH 50: CPT

## 2024-12-28 RX ORDER — ALBUTEROL SULFATE 90 UG/1
1 INHALANT RESPIRATORY (INHALATION) EVERY 8 HOURS
Refills: 0 | Status: COMPLETED | OUTPATIENT
Start: 2024-12-28 | End: 2025-11-26

## 2024-12-28 RX ORDER — IPRATROPIUM BROMIDE AND ALBUTEROL SULFATE .5; 2.5 MG/3ML; MG/3ML
3 SOLUTION RESPIRATORY (INHALATION) EVERY 8 HOURS
Refills: 0 | Status: DISCONTINUED | OUTPATIENT
Start: 2024-12-28 | End: 2025-01-08

## 2024-12-28 RX ADMIN — ACETAMINOPHEN 1000 MILLIGRAM(S): 80 SOLUTION/ DROPS ORAL at 21:03

## 2024-12-28 RX ADMIN — SODIUM CHLORIDE 100 MILLILITER(S): 9 INJECTION, SOLUTION INTRAVENOUS at 13:17

## 2024-12-28 RX ADMIN — ACETAMINOPHEN 1000 MILLIGRAM(S): 80 SOLUTION/ DROPS ORAL at 13:16

## 2024-12-28 RX ADMIN — Medication 2: at 13:16

## 2024-12-28 RX ADMIN — Medication 2: at 22:26

## 2024-12-28 RX ADMIN — FAMOTIDINE 20 MILLIGRAM(S): 20 TABLET, FILM COATED ORAL at 13:17

## 2024-12-28 RX ADMIN — ACETAMINOPHEN 1000 MILLIGRAM(S): 80 SOLUTION/ DROPS ORAL at 05:12

## 2024-12-28 RX ADMIN — Medication 81 MILLIGRAM(S): at 13:17

## 2024-12-28 RX ADMIN — DABIGATRAN ETEXILATE 150 MILLIGRAM(S): 110 CAPSULE ORAL at 05:13

## 2024-12-28 RX ADMIN — DABIGATRAN ETEXILATE 150 MILLIGRAM(S): 110 CAPSULE ORAL at 17:54

## 2024-12-28 RX ADMIN — AMIODARONE HYDROCHLORIDE 100 MILLIGRAM(S): 200 TABLET ORAL at 05:13

## 2024-12-28 RX ADMIN — Medication 5 MILLIGRAM(S): at 05:13

## 2024-12-28 RX ADMIN — Medication 1 TABLET(S): at 13:16

## 2024-12-28 RX ADMIN — Medication 2: at 17:53

## 2024-12-28 RX ADMIN — LISINOPRIL 20 MILLIGRAM(S): 30 TABLET ORAL at 05:12

## 2024-12-28 RX ADMIN — IPRATROPIUM BROMIDE AND ALBUTEROL SULFATE 3 MILLILITER(S): .5; 2.5 SOLUTION RESPIRATORY (INHALATION) at 21:03

## 2024-12-28 RX ADMIN — SENNOSIDES 2 TABLET(S): 8.6 TABLET, FILM COATED ORAL at 21:03

## 2024-12-28 RX ADMIN — IPRATROPIUM BROMIDE AND ALBUTEROL SULFATE 3 MILLILITER(S): .5; 2.5 SOLUTION RESPIRATORY (INHALATION) at 13:17

## 2024-12-28 RX ADMIN — Medication 25 MILLIGRAM(S): at 05:13

## 2024-12-28 NOTE — PROGRESS NOTE ADULT - ASSESSMENT
Mr. Zuhair Mark is an 84/M with CAD, Atrial fibrillation on Pradaxa, CVA (?), DM type 2, HTN and osteoarthritis who presented with chronic left hip pain x 6 months that failed conservative management and admitted for an elective YO by Dr. Echeverria.    #Left hip osteoarthritis   #Post op state  - pain controlled on Tylenol 1g q8h and PRN Tramadol  - Provide adequate analgesia, Incentive spirometry, mobilize with fall precautions, bowel regimen, DVT prophylaxis  - PT/OT    #Constipation  - abdomen Xray no obstruction nor free air  - BM reported by the patient   - mobilize  - bowel regimen with Miralax BID and Senna once at night    #Acute blood loss anemia  - Monitor Hgb; no reported bleeding symptoms   - Transfuse for Hgb < 7  - Ensure Type and Screen available    #Urinary retention  - Lee catheter in place, TOV  - ensure adequate bowel regimen in place    #Leucocytosis, resolved  - likely reactive  - afebrile and denies symptoms of infection  - monitor temp and WBC trend    #CAD  #Atrial fibrillation  - rate controlled on Metoprolol, Amiodarone  - on Pradaxa      #HTN  - on Metoprolol, Amlodipine and Lisinopril  - ensure BP parameters in place    #DM type 2  - A1C 7, controlled  - on correctional insulin; goal glucose 100-180 mg/dL  - can resume Metformin  - DM diet    DVT ppx: Dabigatran    Feel free to reach out for any questions. Recommendations discussed with primary team. Mr. Zuhair Mark is an 84/M with CAD, Atrial fibrillation on Pradaxa, CVA (?), DM type 2, HTN and osteoarthritis who presented with chronic left hip pain x 6 months that failed conservative management and admitted for an elective YO by Dr. Echeverria.    #Left hip osteoarthritis   #Post op state  - pain controlled on Tylenol 1g q8h and PRN Tramadol  - Provide adequate analgesia, Incentive spirometry, mobilize with fall precautions, bowel regimen, DVT prophylaxis  - PT/OT    #Wheezing   Start on duonebs q8 standing for today, PRN from tomorrow   Satting well on RA   encourage incentive spirometer use    #Constipation  - abdomen Xray no obstruction nor free air  - BM reported by the patient   - mobilize  - bowel regimen with Miralax BID and Senna once at night    #Acute blood loss anemia  - Monitor Hgb; no reported bleeding symptoms   - Transfuse for Hgb < 7  - Ensure Type and Screen available    #Urinary retention  - Lee catheter in place, TOV  - ensure adequate bowel regimen in place    #Leucocytosis, resolved  - likely reactive  - afebrile and denies symptoms of infection  - monitor temp and WBC trend    #CAD  #Atrial fibrillation  - rate controlled on Metoprolol, Amiodarone  - on Pradaxa      #HTN  - on Metoprolol, Amlodipine and Lisinopril  - ensure BP parameters in place    #DM type 2  - A1C 7, controlled  - on correctional insulin; goal glucose 100-180 mg/dL  - can resume Metformin  - DM diet    DVT ppx: Dabigatran    Feel free to reach out for any questions. Recommendations discussed with primary team.

## 2024-12-28 NOTE — PROGRESS NOTE ADULT - SUBJECTIVE AND OBJECTIVE BOX
Patient is a 84y old  Male who presents with a chief complaint of left hip pain (28 Dec 2024 07:14)      INTERVAL HPI/OVERNIGHT EVENTS: offers no new complaints; current symptoms resolving    MEDICATIONS  (STANDING):  acetaminophen     Tablet .. 1000 milliGRAM(s) Oral every 8 hours  albuterol    90 MICROgram(s) HFA Inhaler 1 Puff(s) Inhalation every 8 hours  albuterol/ipratropium for Nebulization 3 milliLiter(s) Nebulizer every 8 hours  aMIOdarone    Tablet 100 milliGRAM(s) Oral daily  amLODIPine   Tablet 5 milliGRAM(s) Oral daily  aspirin enteric coated 81 milliGRAM(s) Oral daily  dabigatran 150 milliGRAM(s) Oral every 12 hours  dextrose 5%. 1000 milliLiter(s) (50 mL/Hr) IV Continuous <Continuous>  dextrose 5%. 1000 milliLiter(s) (100 mL/Hr) IV Continuous <Continuous>  dextrose 50% Injectable 25 Gram(s) IV Push once  dextrose 50% Injectable 12.5 Gram(s) IV Push once  dextrose 50% Injectable 25 Gram(s) IV Push once  famotidine    Tablet 20 milliGRAM(s) Oral daily  glucagon  Injectable 1 milliGRAM(s) IntraMuscular once  HYDROmorphone  Injectable 0.5 milliGRAM(s) IV Push once  insulin lispro (ADMELOG) corrective regimen sliding scale   SubCutaneous Before meals and at bedtime  lactated ringers. 1000 milliLiter(s) (100 mL/Hr) IV Continuous <Continuous>  lisinopril 20 milliGRAM(s) Oral daily  metoprolol succinate ER 25 milliGRAM(s) Oral daily  multivitamin 1 Tablet(s) Oral daily  polyethylene glycol 3350 17 Gram(s) Oral two times a day  senna 2 Tablet(s) Oral at bedtime    MEDICATIONS  (PRN):  albuterol    90 MICROgram(s) HFA Inhaler 1 Puff(s) Inhalation three times a day PRN Wheezing  benzocaine/menthol Lozenge 1 Lozenge Oral every 6 hours PRN Sore Throat  dextrose Oral Gel 15 Gram(s) Oral once PRN Blood Glucose LESS THAN 70 milliGRAM(s)/deciliter  magnesium hydroxide Suspension 30 milliLiter(s) Oral daily PRN Constipation  ondansetron Injectable 4 milliGRAM(s) IV Push every 6 hours PRN Nausea and/or Vomiting  traMADol 50 milliGRAM(s) Oral every 6 hours PRN Mild Pain (1 - 3)      __________________________________________________  REVIEW OF SYSTEMS:    CONSTITUTIONAL: No fever,   EYES: no acute visual disturbances  NECK: No pain or stiffness  RESPIRATORY: No cough; No shortness of breath  CARDIOVASCULAR: No chest pain, no palpitations  GASTROINTESTINAL: No pain. No nausea or vomiting; No diarrhea   NEUROLOGICAL: No headache or numbness, no tremors  MUSCULOSKELETAL: No joint pain, no muscle pain  GENITOURINARY: no dysuria, no frequency, no hesitancy  PSYCHIATRY: no depression , no anxiety  ALL OTHER  ROS negative        Vital Signs Last 24 Hrs  T(C): 36.7 (28 Dec 2024 08:44), Max: 37 (28 Dec 2024 00:32)  T(F): 98 (28 Dec 2024 08:44), Max: 98.6 (28 Dec 2024 00:32)  HR: 73 (28 Dec 2024 08:44) (64 - 89)  BP: 128/62 (28 Dec 2024 08:44) (113/60 - 141/91)  BP(mean): --  RR: 18 (28 Dec 2024 08:44) (16 - 20)  SpO2: 100% (28 Dec 2024 08:44) (91% - 100%)    Parameters below as of 28 Dec 2024 08:44  Patient On (Oxygen Delivery Method): room air        ________________________________________________  PHYSICAL EXAM:  GENERAL: NAD  HEENT: Normocephalic;  conjunctivae and sclerae clear; moist mucous membranes;   NECK : supple  CHEST/LUNG: Clear to auscultation bilaterally with good air entry   HEART: S1 S2  regular; no murmurs, gallops or rubs  ABDOMEN: Soft, Nontender, Nondistended; Bowel sounds present  EXTREMITIES: no cyanosis; no edema; no calf tenderness  SKIN: warm and dry; no rash  NERVOUS SYSTEM:  Awake and alert; Oriented  to place, person and time ; no new deficits    _________________________________________________  LABS:                        9.3    9.94  )-----------( 540      ( 28 Dec 2024 05:30 )             30.4     12-28    134[L]  |  105  |  20  ----------------------------<  96  4.2   |  21[L]  |  0.66    Ca    8.1[L]      28 Dec 2024 05:30        Urinalysis Basic - ( 28 Dec 2024 05:30 )    Color: x / Appearance: x / SG: x / pH: x  Gluc: 96 mg/dL / Ketone: x  / Bili: x / Urobili: x   Blood: x / Protein: x / Nitrite: x   Leuk Esterase: x / RBC: x / WBC x   Sq Epi: x / Non Sq Epi: x / Bacteria: x      CAPILLARY BLOOD GLUCOSE      POCT Blood Glucose.: 117 mg/dL (28 Dec 2024 07:36)  POCT Blood Glucose.: 223 mg/dL (27 Dec 2024 22:14)  POCT Blood Glucose.: 141 mg/dL (27 Dec 2024 17:45)  POCT Blood Glucose.: 181 mg/dL (27 Dec 2024 12:31)        RADIOLOGY & ADDITIONAL TESTS:      Plan of care was discussed with patient and /or primary care giver; all questions and concerns were addressed and care was aligned with patient's wishes.     Patient is a 84y old  Male who presents with a chief complaint of left hip pain (28 Dec 2024 07:14)      INTERVAL HPI/OVERNIGHT EVENTS: NAOE. Patient complain of a mild cough. Denies chest pain, sob or any other complaints.     MEDICATIONS  (STANDING):  acetaminophen     Tablet .. 1000 milliGRAM(s) Oral every 8 hours  albuterol    90 MICROgram(s) HFA Inhaler 1 Puff(s) Inhalation every 8 hours  albuterol/ipratropium for Nebulization 3 milliLiter(s) Nebulizer every 8 hours  aMIOdarone    Tablet 100 milliGRAM(s) Oral daily  amLODIPine   Tablet 5 milliGRAM(s) Oral daily  aspirin enteric coated 81 milliGRAM(s) Oral daily  dabigatran 150 milliGRAM(s) Oral every 12 hours  dextrose 5%. 1000 milliLiter(s) (50 mL/Hr) IV Continuous <Continuous>  dextrose 5%. 1000 milliLiter(s) (100 mL/Hr) IV Continuous <Continuous>  dextrose 50% Injectable 25 Gram(s) IV Push once  dextrose 50% Injectable 12.5 Gram(s) IV Push once  dextrose 50% Injectable 25 Gram(s) IV Push once  famotidine    Tablet 20 milliGRAM(s) Oral daily  glucagon  Injectable 1 milliGRAM(s) IntraMuscular once  HYDROmorphone  Injectable 0.5 milliGRAM(s) IV Push once  insulin lispro (ADMELOG) corrective regimen sliding scale   SubCutaneous Before meals and at bedtime  lactated ringers. 1000 milliLiter(s) (100 mL/Hr) IV Continuous <Continuous>  lisinopril 20 milliGRAM(s) Oral daily  metoprolol succinate ER 25 milliGRAM(s) Oral daily  multivitamin 1 Tablet(s) Oral daily  polyethylene glycol 3350 17 Gram(s) Oral two times a day  senna 2 Tablet(s) Oral at bedtime    MEDICATIONS  (PRN):  albuterol    90 MICROgram(s) HFA Inhaler 1 Puff(s) Inhalation three times a day PRN Wheezing  benzocaine/menthol Lozenge 1 Lozenge Oral every 6 hours PRN Sore Throat  dextrose Oral Gel 15 Gram(s) Oral once PRN Blood Glucose LESS THAN 70 milliGRAM(s)/deciliter  magnesium hydroxide Suspension 30 milliLiter(s) Oral daily PRN Constipation  ondansetron Injectable 4 milliGRAM(s) IV Push every 6 hours PRN Nausea and/or Vomiting  traMADol 50 milliGRAM(s) Oral every 6 hours PRN Mild Pain (1 - 3)      Vital Signs Last 24 Hrs  T(C): 36.7 (28 Dec 2024 08:44), Max: 37 (28 Dec 2024 00:32)  T(F): 98 (28 Dec 2024 08:44), Max: 98.6 (28 Dec 2024 00:32)  HR: 73 (28 Dec 2024 08:44) (64 - 89)  BP: 128/62 (28 Dec 2024 08:44) (113/60 - 141/91)  BP(mean): --  RR: 18 (28 Dec 2024 08:44) (16 - 20)  SpO2: 100% (28 Dec 2024 08:44) (91% - 100%)    Parameters below as of 28 Dec 2024 08:44  Patient On (Oxygen Delivery Method): room air        ________________________________________________  PHYSICAL EXAM:  GENERAL: NAD  HEENT: moist mucous membranes;   NECK : supple  CHEST/LUNG: Right sided wheezing   HEART: S1 S2  regular  ABDOMEN: Soft, Nontender, Nondistended  EXTREMITIES: no cyanosis  SKIN: warm and dry; no rash  NERVOUS SYSTEM:  Awake and alert; Oriented  to place, person and time ; no new deficits    _________________________________________________  LABS:                        9.3    9.94  )-----------( 540      ( 28 Dec 2024 05:30 )             30.4     12-28    134[L]  |  105  |  20  ----------------------------<  96  4.2   |  21[L]  |  0.66    Ca    8.1[L]      28 Dec 2024 05:30        Urinalysis Basic - ( 28 Dec 2024 05:30 )    Color: x / Appearance: x / SG: x / pH: x  Gluc: 96 mg/dL / Ketone: x  / Bili: x / Urobili: x   Blood: x / Protein: x / Nitrite: x   Leuk Esterase: x / RBC: x / WBC x   Sq Epi: x / Non Sq Epi: x / Bacteria: x      CAPILLARY BLOOD GLUCOSE      POCT Blood Glucose.: 117 mg/dL (28 Dec 2024 07:36)  POCT Blood Glucose.: 223 mg/dL (27 Dec 2024 22:14)  POCT Blood Glucose.: 141 mg/dL (27 Dec 2024 17:45)  POCT Blood Glucose.: 181 mg/dL (27 Dec 2024 12:31)        RADIOLOGY & ADDITIONAL TESTS:      Plan of care was discussed with patient and /or primary care giver; all questions and concerns were addressed and care was aligned with patient's wishes.

## 2024-12-28 NOTE — PROGRESS NOTE ADULT - SUBJECTIVE AND OBJECTIVE BOX
Ortho Note    Pt comfortable without complaints, pain controlled  Denies CP, SOB, N/V, numbness/tingling     Vital Signs Last 24 Hrs  T(C): 36.7 (27 Dec 2024 05:00), Max: 36.8 (26 Dec 2024 10:00)  T(F): 98 (27 Dec 2024 05:00), Max: 98.2 (26 Dec 2024 10:00)  HR: 69 (27 Dec 2024 05:00) (67 - 95)  BP: 134/53 (27 Dec 2024 05:00) (125/60 - 140/78)  BP(mean): --  RR: 18 (27 Dec 2024 05:00) (16 - 19)  SpO2: 96% (27 Dec 2024 05:00) (96% - 100%)    Parameters below as of 27 Dec 2024 05:00  Patient On (Oxygen Delivery Method): room air      General: Pt Alert and oriented, NAD  LLE   DSG C/D/I  Pulses: 2+ DP   Sensation: silt sural/saph/dpn/spn/tib   Motor: Quad/Ham/EHL/FHL/TA/GS 5/5 in strength                    84yM s/p Left YO by Dr. SATISH Echeverria on 12-17  - Weight Bearing Status: Protected Weight Bearing, no active abduction of left hip, no abduction pillow  - Pain control  - DVT PPx: Pradaxa, ASA  - F/u AM labs  -Pt progress:2 person assist   Dispo: out of pocket [facility] vs HPT , plan for interdisciplinary discussion on dispo status on 12/30     Ortho Pager [phone]

## 2024-12-29 LAB
ANION GAP SERPL CALC-SCNC: 10 MMOL/L — SIGNIFICANT CHANGE UP (ref 5–17)
BUN SERPL-MCNC: 21 MG/DL — SIGNIFICANT CHANGE UP (ref 7–23)
CALCIUM SERPL-MCNC: 8.1 MG/DL — LOW (ref 8.4–10.5)
CHLORIDE SERPL-SCNC: 105 MMOL/L — SIGNIFICANT CHANGE UP (ref 96–108)
CO2 SERPL-SCNC: 19 MMOL/L — LOW (ref 22–31)
CREAT SERPL-MCNC: 0.67 MG/DL — SIGNIFICANT CHANGE UP (ref 0.5–1.3)
EGFR: 91 ML/MIN/1.73M2 — SIGNIFICANT CHANGE UP
GLUCOSE SERPL-MCNC: 129 MG/DL — HIGH (ref 70–99)
HCT VFR BLD CALC: 28 % — LOW (ref 39–50)
HGB BLD-MCNC: 8.7 G/DL — LOW (ref 13–17)
MCHC RBC-ENTMCNC: 27.9 PG — SIGNIFICANT CHANGE UP (ref 27–34)
MCHC RBC-ENTMCNC: 31.1 G/DL — LOW (ref 32–36)
MCV RBC AUTO: 89.7 FL — SIGNIFICANT CHANGE UP (ref 80–100)
NRBC # BLD: 0 /100 WBCS — SIGNIFICANT CHANGE UP (ref 0–0)
PLATELET # BLD AUTO: 474 K/UL — HIGH (ref 150–400)
POTASSIUM SERPL-MCNC: 4.3 MMOL/L — SIGNIFICANT CHANGE UP (ref 3.5–5.3)
POTASSIUM SERPL-SCNC: 4.3 MMOL/L — SIGNIFICANT CHANGE UP (ref 3.5–5.3)
RBC # BLD: 3.12 M/UL — LOW (ref 4.2–5.8)
RBC # FLD: 15.9 % — HIGH (ref 10.3–14.5)
SODIUM SERPL-SCNC: 134 MMOL/L — LOW (ref 135–145)
WBC # BLD: 11.86 K/UL — HIGH (ref 3.8–10.5)
WBC # FLD AUTO: 11.86 K/UL — HIGH (ref 3.8–10.5)

## 2024-12-29 PROCEDURE — 71045 X-RAY EXAM CHEST 1 VIEW: CPT | Mod: 26

## 2024-12-29 PROCEDURE — 99233 SBSQ HOSP IP/OBS HIGH 50: CPT

## 2024-12-29 RX ORDER — CALCIUM CARBONATE 750 MG/1
1 TABLET, CHEWABLE ORAL DAILY
Refills: 0 | Status: DISCONTINUED | OUTPATIENT
Start: 2024-12-29 | End: 2025-01-08

## 2024-12-29 RX ORDER — FUROSEMIDE 20 MG
20 TABLET ORAL ONCE
Refills: 0 | Status: COMPLETED | OUTPATIENT
Start: 2024-12-29 | End: 2024-12-29

## 2024-12-29 RX ORDER — CALCIUM CARBONATE 750 MG/1
1 TABLET, CHEWABLE ORAL DAILY
Refills: 0 | Status: DISCONTINUED | OUTPATIENT
Start: 2024-12-29 | End: 2024-12-29

## 2024-12-29 RX ADMIN — Medication 5 MILLIGRAM(S): at 05:14

## 2024-12-29 RX ADMIN — Medication 81 MILLIGRAM(S): at 12:23

## 2024-12-29 RX ADMIN — Medication 1 TABLET(S): at 12:23

## 2024-12-29 RX ADMIN — FAMOTIDINE 20 MILLIGRAM(S): 20 TABLET, FILM COATED ORAL at 12:23

## 2024-12-29 RX ADMIN — Medication 2: at 12:55

## 2024-12-29 RX ADMIN — IPRATROPIUM BROMIDE AND ALBUTEROL SULFATE 3 MILLILITER(S): .5; 2.5 SOLUTION RESPIRATORY (INHALATION) at 21:00

## 2024-12-29 RX ADMIN — CALCIUM CARBONATE 1 TABLET(S): 750 TABLET, CHEWABLE ORAL at 21:01

## 2024-12-29 RX ADMIN — DABIGATRAN ETEXILATE 150 MILLIGRAM(S): 110 CAPSULE ORAL at 05:13

## 2024-12-29 RX ADMIN — Medication 20 MILLIGRAM(S): at 12:23

## 2024-12-29 RX ADMIN — ACETAMINOPHEN 1000 MILLIGRAM(S): 80 SOLUTION/ DROPS ORAL at 05:14

## 2024-12-29 RX ADMIN — SODIUM CHLORIDE 100 MILLILITER(S): 9 INJECTION, SOLUTION INTRAVENOUS at 07:20

## 2024-12-29 RX ADMIN — DABIGATRAN ETEXILATE 150 MILLIGRAM(S): 110 CAPSULE ORAL at 17:16

## 2024-12-29 RX ADMIN — SENNOSIDES 2 TABLET(S): 8.6 TABLET, FILM COATED ORAL at 21:00

## 2024-12-29 RX ADMIN — AMIODARONE HYDROCHLORIDE 100 MILLIGRAM(S): 200 TABLET ORAL at 05:14

## 2024-12-29 RX ADMIN — ALBUTEROL SULFATE 1 PUFF(S): 90 INHALANT RESPIRATORY (INHALATION) at 07:10

## 2024-12-29 RX ADMIN — LISINOPRIL 20 MILLIGRAM(S): 30 TABLET ORAL at 05:13

## 2024-12-29 RX ADMIN — ACETAMINOPHEN 1000 MILLIGRAM(S): 80 SOLUTION/ DROPS ORAL at 12:54

## 2024-12-29 RX ADMIN — IPRATROPIUM BROMIDE AND ALBUTEROL SULFATE 3 MILLILITER(S): .5; 2.5 SOLUTION RESPIRATORY (INHALATION) at 12:54

## 2024-12-29 RX ADMIN — Medication 25 MILLIGRAM(S): at 05:14

## 2024-12-29 RX ADMIN — Medication 2: at 17:16

## 2024-12-29 RX ADMIN — IPRATROPIUM BROMIDE AND ALBUTEROL SULFATE 3 MILLILITER(S): .5; 2.5 SOLUTION RESPIRATORY (INHALATION) at 05:15

## 2024-12-29 RX ADMIN — ACETAMINOPHEN 1000 MILLIGRAM(S): 80 SOLUTION/ DROPS ORAL at 21:00

## 2024-12-29 NOTE — PROGRESS NOTE ADULT - SUBJECTIVE AND OBJECTIVE BOX
Ortho Note    Pt comfortable without complaints, pain controlled  Denies CP, SOB, N/V, numbness/tingling     Vital Signs Last 24 Hrs  T(C): 36.7 (27 Dec 2024 05:00), Max: 36.8 (26 Dec 2024 10:00)  T(F): 98 (27 Dec 2024 05:00), Max: 98.2 (26 Dec 2024 10:00)  HR: 69 (27 Dec 2024 05:00) (67 - 95)  BP: 134/53 (27 Dec 2024 05:00) (125/60 - 140/78)  BP(mean): --  RR: 18 (27 Dec 2024 05:00) (16 - 19)  SpO2: 96% (27 Dec 2024 05:00) (96% - 100%)    Parameters below as of 27 Dec 2024 05:00  Patient On (Oxygen Delivery Method): room air      General: Pt Alert and oriented, NAD  LLE   DSG C/D/I  Pulses: 2+ DP   Sensation: silt sural/saph/dpn/spn/tib   Motor: Quad/Ham/EHL/FHL/TA/GS 5/5 in strength                    84yM s/p Left YO by Dr. SATISH Echeverria on 12-17  - Weight Bearing Status: Protected Weight Bearing, no active abduction of left hip, no abduction pillow  - Pain control  - DVT PPx: Pradaxa, ASA  - F/u AM labs  -Pt progress:2 person assist   Dispo: out of pocket [facility] vs HPT , plan for interdisciplinary discussion on dispo status on 12/30   - katja juarze TOALEX 12/29      Ortho Pager [phone]

## 2024-12-29 NOTE — PROGRESS NOTE ADULT - ASSESSMENT
Mr. Zuhair Mark is an 84/M with CAD, Atrial fibrillation on Pradaxa, CVA (?), DM type 2, HTN and osteoarthritis who presented with chronic left hip pain x 6 months that failed conservative management and admitted for an elective YO by Dr. Echeverria.    #Left hip osteoarthritis   #Post op state  - pain controlled on Tylenol 1g q8h and PRN Tramadol  - Provide adequate analgesia, Incentive spirometry, mobilize with fall precautions, bowel regimen, DVT prophylaxis  - PT/OT    #Fluid overload  #HFmrEF  Satting well on RA, HDS  Bilateral wheeze with +1 LE edema   CXR: Pulmonary vascular congestion. No pneumothorax. Moderate bilateral pleural effusions. The cardiomediastinal silhouette is suboptimally evaluated. No acute osseous abnormality  ProBNP: 3554  Echo 08/24: Left ventricular systolic function is grossly mildly decreased with an ejection fraction visually estimated at 45 to 50 %. Regional wall motion abnormalities present. Left atrium is moderately dilated. Mild to moderate aortic regurgitation. Moderate aortic stenosis  Stress test 10/24: Normal myocardial perfusion scan, with no evidence of infarction or inducible ischemia  Give 20mg IV lasix  Order repeat echo  Will reassess daily for diuresis   Continue metoprolol, lisinopril   Follows with cardiologist Dr AVELINO GALICIA,        #Constipation  - abdomen Xray no obstruction nor free air  - BM reported by the patient   - mobilize  - bowel regimen with Miralax BID and Senna once at night    #Acute blood loss anemia  - Monitor Hgb; no reported bleeding symptoms   - Transfuse for Hgb < 7  - Ensure Type and Screen available    #Urinary retention  - Lee catheter in place, TOV  - ensure adequate bowel regimen in place    #Leucocytosis, resolved  - likely reactive  - afebrile and denies symptoms of infection  - monitor temp and WBC trend    #CAD  #Atrial fibrillation  - rate controlled on Metoprolol, Amiodarone  - on Pradaxa      #HTN  - on Metoprolol, Amlodipine and Lisinopril  - ensure BP parameters in place    #DM type 2  - A1C 7, controlled  - on correctional insulin; goal glucose 100-180 mg/dL  - can resume Metformin  - DM diet    DVT ppx: Dabigatran    Feel free to reach out for any questions. Recommendations discussed with primary team.

## 2024-12-30 ENCOUNTER — RESULT REVIEW (OUTPATIENT)
Age: 84
End: 2024-12-30

## 2024-12-30 DIAGNOSIS — E87.70 FLUID OVERLOAD, UNSPECIFIED: ICD-10-CM

## 2024-12-30 LAB
ANION GAP SERPL CALC-SCNC: 10 MMOL/L — SIGNIFICANT CHANGE UP (ref 5–17)
BUN SERPL-MCNC: 17 MG/DL — SIGNIFICANT CHANGE UP (ref 7–23)
CALCIUM SERPL-MCNC: 8.1 MG/DL — LOW (ref 8.4–10.5)
CHLORIDE SERPL-SCNC: 103 MMOL/L — SIGNIFICANT CHANGE UP (ref 96–108)
CO2 SERPL-SCNC: 20 MMOL/L — LOW (ref 22–31)
CREAT SERPL-MCNC: 0.66 MG/DL — SIGNIFICANT CHANGE UP (ref 0.5–1.3)
EGFR: 91 ML/MIN/1.73M2 — SIGNIFICANT CHANGE UP
GLUCOSE SERPL-MCNC: 113 MG/DL — HIGH (ref 70–99)
HCT VFR BLD CALC: 27.6 % — LOW (ref 39–50)
HGB BLD-MCNC: 8.4 G/DL — LOW (ref 13–17)
MCHC RBC-ENTMCNC: 27.4 PG — SIGNIFICANT CHANGE UP (ref 27–34)
MCHC RBC-ENTMCNC: 30.4 G/DL — LOW (ref 32–36)
MCV RBC AUTO: 89.9 FL — SIGNIFICANT CHANGE UP (ref 80–100)
NRBC # BLD: 0 /100 WBCS — SIGNIFICANT CHANGE UP (ref 0–0)
PLATELET # BLD AUTO: 508 K/UL — HIGH (ref 150–400)
POTASSIUM SERPL-MCNC: 4 MMOL/L — SIGNIFICANT CHANGE UP (ref 3.5–5.3)
POTASSIUM SERPL-SCNC: 4 MMOL/L — SIGNIFICANT CHANGE UP (ref 3.5–5.3)
RBC # BLD: 3.07 M/UL — LOW (ref 4.2–5.8)
RBC # FLD: 15.8 % — HIGH (ref 10.3–14.5)
SODIUM SERPL-SCNC: 133 MMOL/L — LOW (ref 135–145)
WBC # BLD: 11.62 K/UL — HIGH (ref 3.8–10.5)
WBC # FLD AUTO: 11.62 K/UL — HIGH (ref 3.8–10.5)

## 2024-12-30 PROCEDURE — 93306 TTE W/DOPPLER COMPLETE: CPT | Mod: 26

## 2024-12-30 PROCEDURE — 99233 SBSQ HOSP IP/OBS HIGH 50: CPT

## 2024-12-30 RX ORDER — FUROSEMIDE 20 MG
40 TABLET ORAL ONCE
Refills: 0 | Status: DISCONTINUED | OUTPATIENT
Start: 2024-12-30 | End: 2025-01-08

## 2024-12-30 RX ORDER — TAMSULOSIN HYDROCHLORIDE 0.4 MG/1
0.4 CAPSULE ORAL AT BEDTIME
Refills: 0 | Status: DISCONTINUED | OUTPATIENT
Start: 2024-12-30 | End: 2025-01-08

## 2024-12-30 RX ADMIN — ACETAMINOPHEN 1000 MILLIGRAM(S): 80 SOLUTION/ DROPS ORAL at 22:00

## 2024-12-30 RX ADMIN — Medication 25 MILLIGRAM(S): at 05:15

## 2024-12-30 RX ADMIN — LISINOPRIL 20 MILLIGRAM(S): 30 TABLET ORAL at 05:15

## 2024-12-30 RX ADMIN — DABIGATRAN ETEXILATE 150 MILLIGRAM(S): 110 CAPSULE ORAL at 18:20

## 2024-12-30 RX ADMIN — TAMSULOSIN HYDROCHLORIDE 0.4 MILLIGRAM(S): 0.4 CAPSULE ORAL at 22:00

## 2024-12-30 RX ADMIN — Medication 2: at 18:19

## 2024-12-30 RX ADMIN — SODIUM CHLORIDE 100 MILLILITER(S): 9 INJECTION, SOLUTION INTRAVENOUS at 05:14

## 2024-12-30 RX ADMIN — IPRATROPIUM BROMIDE AND ALBUTEROL SULFATE 3 MILLILITER(S): .5; 2.5 SOLUTION RESPIRATORY (INHALATION) at 22:00

## 2024-12-30 RX ADMIN — DABIGATRAN ETEXILATE 150 MILLIGRAM(S): 110 CAPSULE ORAL at 05:15

## 2024-12-30 RX ADMIN — Medication 17 GRAM(S): at 05:16

## 2024-12-30 RX ADMIN — IPRATROPIUM BROMIDE AND ALBUTEROL SULFATE 3 MILLILITER(S): .5; 2.5 SOLUTION RESPIRATORY (INHALATION) at 05:15

## 2024-12-30 RX ADMIN — Medication 5 MILLIGRAM(S): at 05:15

## 2024-12-30 RX ADMIN — ACETAMINOPHEN 1000 MILLIGRAM(S): 80 SOLUTION/ DROPS ORAL at 05:15

## 2024-12-30 RX ADMIN — SENNOSIDES 2 TABLET(S): 8.6 TABLET, FILM COATED ORAL at 22:00

## 2024-12-30 RX ADMIN — AMIODARONE HYDROCHLORIDE 100 MILLIGRAM(S): 200 TABLET ORAL at 05:15

## 2024-12-30 RX ADMIN — Medication 81 MILLIGRAM(S): at 13:44

## 2024-12-30 RX ADMIN — IPRATROPIUM BROMIDE AND ALBUTEROL SULFATE 3 MILLILITER(S): .5; 2.5 SOLUTION RESPIRATORY (INHALATION) at 13:45

## 2024-12-30 RX ADMIN — Medication 1 TABLET(S): at 13:44

## 2024-12-30 RX ADMIN — FAMOTIDINE 20 MILLIGRAM(S): 20 TABLET, FILM COATED ORAL at 13:44

## 2024-12-30 RX ADMIN — ACETAMINOPHEN 1000 MILLIGRAM(S): 80 SOLUTION/ DROPS ORAL at 13:45

## 2024-12-30 NOTE — PROGRESS NOTE ADULT - SUBJECTIVE AND OBJECTIVE BOX
Patient is a 84y old  Male who presents with a chief complaint of left hip pain (30 Dec 2024 12:49)        SUBJECTIVE:  Patient was seen and examined at bedside, states still has dry cough, but denies wheezing, SOB, CP, F/C.    Overnight Events : NAEON    Last Bowel Movement: 28-Dec-2024 (12-30)  Last Bowel Movement: 28-Dec-2024 (12-29)  Last Bowel Movement: 28-Dec-2024 (12-29)  Last Bowel Movement: 28-Dec-2024 (12-28)  Last Bowel Movement: 27-Dec-2024 (12-28)  Last Bowel Movement: 25-Dec-2024 (12-27)  Last Bowel Movement: 25-Dec-2024 (12-27)  Last Bowel Movement: 25-Dec-2024 (12-26)  Last Bowel Movement: 25-Dec-2024 (12-26)  Last Bowel Movement: 23-Dec-2024 (12-25)  Last Bowel Movement: 23-Dec-2024 (12-25)  Last Bowel Movement: 17-Dec-2024 (12-24)  Last Bowel Movement: 17-Dec-2024 (12-24)  Last Bowel Movement: 17-Dec-2024 (12-23)      Review of systems: 12 point Review of systems negative unless otherwise documented elsewhere in note.     Diet, Consistent Carbohydrate/No Snacks:   Supplement Feeding Modality:  Oral  Ensure Max Cans or Servings Per Day:  1       Frequency:  Three Times a day (12-20-24 @ 13:26) [Active]      MEDICATIONS:  MEDICATIONS  (STANDING):  acetaminophen     Tablet .. 1000 milliGRAM(s) Oral every 8 hours  albuterol    90 MICROgram(s) HFA Inhaler 1 Puff(s) Inhalation every 8 hours  albuterol/ipratropium for Nebulization 3 milliLiter(s) Nebulizer every 8 hours  aMIOdarone    Tablet 100 milliGRAM(s) Oral daily  amLODIPine   Tablet 5 milliGRAM(s) Oral daily  aspirin enteric coated 81 milliGRAM(s) Oral daily  dabigatran 150 milliGRAM(s) Oral every 12 hours  dextrose 5%. 1000 milliLiter(s) (50 mL/Hr) IV Continuous <Continuous>  dextrose 5%. 1000 milliLiter(s) (100 mL/Hr) IV Continuous <Continuous>  dextrose 50% Injectable 25 Gram(s) IV Push once  dextrose 50% Injectable 12.5 Gram(s) IV Push once  dextrose 50% Injectable 25 Gram(s) IV Push once  famotidine    Tablet 20 milliGRAM(s) Oral daily  furosemide    Tablet 40 milliGRAM(s) Oral once  glucagon  Injectable 1 milliGRAM(s) IntraMuscular once  HYDROmorphone  Injectable 0.5 milliGRAM(s) IV Push once  insulin lispro (ADMELOG) corrective regimen sliding scale   SubCutaneous Before meals and at bedtime  lactated ringers. 1000 milliLiter(s) (100 mL/Hr) IV Continuous <Continuous>  lisinopril 20 milliGRAM(s) Oral daily  metoprolol succinate ER 25 milliGRAM(s) Oral daily  multivitamin 1 Tablet(s) Oral daily  polyethylene glycol 3350 17 Gram(s) Oral two times a day  senna 2 Tablet(s) Oral at bedtime  tamsulosin 0.4 milliGRAM(s) Oral at bedtime    MEDICATIONS  (PRN):  albuterol    90 MICROgram(s) HFA Inhaler 1 Puff(s) Inhalation three times a day PRN Wheezing  benzocaine/menthol Lozenge 1 Lozenge Oral every 6 hours PRN Sore Throat  calcium carbonate    500 mG (Tums) Chewable 1 Tablet(s) Chew daily PRN Heartburn  dextrose Oral Gel 15 Gram(s) Oral once PRN Blood Glucose LESS THAN 70 milliGRAM(s)/deciliter  magnesium hydroxide Suspension 30 milliLiter(s) Oral daily PRN Constipation  ondansetron Injectable 4 milliGRAM(s) IV Push every 6 hours PRN Nausea and/or Vomiting  traMADol 50 milliGRAM(s) Oral every 6 hours PRN Mild Pain (1 - 3)      Allergies    statins (Hepatotoxicity)    Intolerances        OBJECTIVE:  Vital Signs Last 24 Hrs  T(C): 36.7 (30 Dec 2024 08:10), Max: 36.9 (29 Dec 2024 17:00)  T(F): 98 (30 Dec 2024 08:10), Max: 98.5 (29 Dec 2024 20:02)  HR: 81 (30 Dec 2024 08:10) (73 - 86)  BP: 131/92 (30 Dec 2024 08:10) (126/72 - 140/77)  BP(mean): --  RR: 18 (30 Dec 2024 08:10) (16 - 18)  SpO2: 99% (30 Dec 2024 08:10) (97% - 100%)    Parameters below as of 30 Dec 2024 08:10  Patient On (Oxygen Delivery Method): room air      I&O's Summary    29 Dec 2024 07:01  -  30 Dec 2024 07:00  --------------------------------------------------------  IN: 1100 mL / OUT: 3000 mL / NET: -1900 mL        PHYSICAL EXAM:  Gen: Resting in bed at time of exam, not in distress   CV: +S1/S2  Pulm: no wheezing , no crackles  no increase in work of breathing  Abd: soft, NTND  Skin: warm and dry, no new rashes   Ext: moving all 4 extremities spontaneously, trace to 1+pitting edema  Neuro: AOx3, no gross focal neurological deficits  Psych: affect and behavior appropriate, pleasant at time of interview    LABS:                        8.4    11.62 )-----------( 508      ( 30 Dec 2024 08:05 )             27.6     12-30    133[L]  |  103  |  17  ----------------------------<  113[H]  4.0   |  20[L]  |  0.66    Ca    8.1[L]      30 Dec 2024 08:04          CAPILLARY BLOOD GLUCOSE      POCT Blood Glucose.: 137 mg/dL (30 Dec 2024 13:28)  POCT Blood Glucose.: 117 mg/dL (30 Dec 2024 07:09)  POCT Blood Glucose.: 95 mg/dL (29 Dec 2024 20:57)  POCT Blood Glucose.: 193 mg/dL (29 Dec 2024 17:14)    Urinalysis Basic - ( 30 Dec 2024 08:04 )    Color: x / Appearance: x / SG: x / pH: x  Gluc: 113 mg/dL / Ketone: x  / Bili: x / Urobili: x   Blood: x / Protein: x / Nitrite: x   Leuk Esterase: x / RBC: x / WBC x   Sq Epi: x / Non Sq Epi: x / Bacteria: x

## 2024-12-30 NOTE — PROGRESS NOTE ADULT - SUBJECTIVE AND OBJECTIVE BOX
Ortho Note    Subjective:  Pt comfortable without complaints, Axox4, able to make needs known, pain controlled with current pain medication regimen   Denies CP, SOB, N/V, numbness/tingling   Reviewed plan of care with patient, HHA  at bedside    Vital Signs Last 24 Hrs  T(C): 36.7 (12-30-24 @ 08:10), Max: 36.7 (12-30-24 @ 08:10)  T(F): 98 (12-30-24 @ 08:10), Max: 98 (12-30-24 @ 08:10)  HR: 81 (12-30-24 @ 08:10) (81 - 81)  BP: 131/92 (12-30-24 @ 08:10) (131/92 - 131/92)  BP(mean): --  RR: 18 (12-30-24 @ 08:10) (18 - 18)  SpO2: 99% (12-30-24 @ 08:10) (99% - 99%)  AVSS    Objective:    Physical Exam:  General: Pt Alert and oriented, NAD  LLE Prineo  DSG C/D/I  Pulses: 2+ DP   Sensation: silt bilateral LE   Motor: Quad/Ham/EHL/FHL/TA/GS 5/5 in strength   juarez  +1 bilateral LE edema       84yM s/p Left YO by Dr. SATISH Echeverria on 12-17  - afebrile, wbcs 11.62  - Weight Bearing Status: Protected Weight Bearing, no active abduction of left hip, no abduction pillow  - Pain control- tylenol 1000mg PO Q8h, tramadol 50mg PO Q6th prn mild pain   - DVT PPx: Pradaxa 150mg PO Q12h, ASA 81mg PO daily   - appreciate medicine recs   - Pt progress: 2 person assist   - bowel regimen, IS use, PPI   - discontinue juarez 12-30 TOV  - dry non productive cough, chest xray from 12-29  IMPRESSION: Pulmonary vascular congestion. No pneumothorax. Moderate bilateral pleural effusions. The cardiomediastinal silhouette is suboptimally evaluated. No acute osseous abnormality. - PLAN for lasix 40mg PO x1     Dispo: out of pocket [facility] vs HPT, pending medical clearance, family meeting with HCP luis in [place] to discuss disposition       Ortho Pager [phone]   Ortho Note    Subjective:  Pt comfortable without complaints, Axox4, able to make needs known, pain controlled with current pain medication regimen   Denies CP, SOB, N/V, numbness/tingling   Reviewed plan of care with patient, HHA  at bedside    Vital Signs Last 24 Hrs  T(C): 36.7 (12-30-24 @ 08:10), Max: 36.7 (12-30-24 @ 08:10)  T(F): 98 (12-30-24 @ 08:10), Max: 98 (12-30-24 @ 08:10)  HR: 81 (12-30-24 @ 08:10) (81 - 81)  BP: 131/92 (12-30-24 @ 08:10) (131/92 - 131/92)  BP(mean): --  RR: 18 (12-30-24 @ 08:10) (18 - 18)  SpO2: 99% (12-30-24 @ 08:10) (99% - 99%)  AVSS    Objective:    Physical Exam:  General: Pt Alert and oriented, NAD  LLE Prineo  DSG C/D/I  Pulses: 2+ DP   Sensation: silt bilateral LE   Motor: Quad/Ham/EHL/FHL/TA/GS 5/5 in strength   juarez  +1 bilateral LE edema       84yM s/p Left YO by Dr. SATISH Echeverria on 12-17  - afebrile, wbcs 11.62  - Weight Bearing Status: Protected Weight Bearing, no active abduction of left hip, no abduction pillow  - Pain control- tylenol 1000mg PO Q8h, tramadol 50mg PO Q6th prn mild pain   - DVT PPx: Pradaxa 150mg PO Q12h, ASA 81mg PO daily   - appreciate medicine recs   - Pt progress: 2 person assist   - bowel regimen, IS use, PPI   - discontinue juarez 12-30 TOV  - dry non productive cough, chest xray from 12-29 IMPRESSION: Pulmonary vascular congestion. No pneumothorax. Moderate bilateral pleural effusions. The cardiomediastinal silhouette is suboptimally evaluated. No acute osseous abnormality. - PLAN for lasix 40mg PO x1     Dispo: out of pocket [facility] vs HPT, pending medical clearance, family meeting with HCP luis in [place] to discuss disposition       Ortho Pager [phone]

## 2024-12-30 NOTE — PROGRESS NOTE ADULT - ASSESSMENT
84/M with CAD, Atrial fibrillation on Pradaxa, CVA (?), DM type 2, HTN and osteoarthritis who presented with chronic left hip pain x 6 months that failed conservative management and admitted for an elective YO by Dr. Echeverria.    #Left hip osteoarthritis   #Post op state  - pain controlled on Tylenol 1g q8h and PRN Tramadol  - Provide adequate analgesia, Incentive spirometry, mobilize with fall precautions, bowel regimen, DVT prophylaxis  - PT/OT    #Fluid overload  #HFmrEF  Satting well on RA, HDS, no wheezing today, bilateral pitting to 1+ LE edema  CXR 12/29: Pulmonary vascular congestion. No pneumothorax. Moderate bilateral pleural effusions. The cardiomediastinal silhouette is suboptimally evaluated. No acute osseous abnormality  ProBNP: 3554  Echo 08/24: Left ventricular systolic function is grossly mildly decreased with an ejection fraction visually estimated at 45 to 50 %. Regional wall motion abnormalities present. Left atrium is moderately dilated. Mild to moderate aortic regurgitation. Moderate aortic stenosis  Stress test 10/24: Normal myocardial perfusion scan, with no evidence of infarction or inducible ischemia  - sp 20mg IV lasix 12/29  - pending repeat echo  - today trial 40 mg PO furosemide, will reassess daily for diuresis   - may need PRN PO diuresis on discharge, discussed w ptn obtaining daily weights at home to monitor for volume overload and assessing lower extremity edema, ptn understands and in agreement  - Continue metoprolol 25, lisinopril 20  - Follows with cardiologist Dr AVELINO GALICIA, please ensure outpatient follow-up    #Constipation  - abdomen Xray no obstruction nor free air  - BM reported by the patient   - mobilize  - bowel regimen with Miralax BID and Senna once at night    #Acute blood loss anemia  - Monitor Hgb; no reported bleeding symptoms   - Transfuse for Hgb < 7  - Ensure Type and Screen available    #Urinary retention  - Lee catheter in place, TOV  - ensure adequate bowel regimen in place    #Leucocytosis, resolved  - likely reactive  - afebrile and denies symptoms of infection  - monitor temp and WBC trend    #CAD  #Atrial fibrillation  - rate controlled on Metoprolol, Amiodarone 100  - on dabigatran 150 q12    #HTN  - on Metoprolol, Amlodipine and Lisinopril  - ensure BP parameters in place    #DM type 2  - A1C 7, controlled  - on correctional insulin; goal glucose 100-180 mg/dL  - can resume Metformin on discharge  - DM diet    DVT ppx: Dabigatran

## 2024-12-30 NOTE — PROGRESS NOTE ADULT - SUBJECTIVE AND OBJECTIVE BOX
Ortho Note    Pt comfortable without complaints, pain controlled  Denies CP, SOB, N/V, numbness/tingling     Vital Signs Last 24 Hrs  T(C): 36.7 (27 Dec 2024 05:00), Max: 36.8 (26 Dec 2024 10:00)  T(F): 98 (27 Dec 2024 05:00), Max: 98.2 (26 Dec 2024 10:00)  HR: 69 (27 Dec 2024 05:00) (67 - 95)  BP: 134/53 (27 Dec 2024 05:00) (125/60 - 140/78)  BP(mean): --  RR: 18 (27 Dec 2024 05:00) (16 - 19)  SpO2: 96% (27 Dec 2024 05:00) (96% - 100%)    Parameters below as of 27 Dec 2024 05:00  Patient On (Oxygen Delivery Method): room air      General: Pt Alert and oriented, NAD  LLE   DSG C/D/I  Pulses: 2+ DP   Sensation: silt sural/saph/dpn/spn/tib   Motor: Quad/Ham/EHL/FHL/TA/GS 5/5 in strength                    84yM s/p Left YO by Dr. SATISH Echeverria on 12-17  - Weight Bearing Status: Protected Weight Bearing, no active abduction of left hip, no abduction pillow  - Pain control  - DVT PPx: Pradaxa, ASA  - F/u AM labs  -Pt progress:2 person assist   Dispo: out of pocket [facility] vs HPT , plan for interdisciplinary discussion on dispo status on 12/30   - FU urology re juarez removal today 12/30     Ortho Pager [phone]

## 2024-12-31 DIAGNOSIS — I35.0 NONRHEUMATIC AORTIC (VALVE) STENOSIS: ICD-10-CM

## 2024-12-31 LAB
ANION GAP SERPL CALC-SCNC: 13 MMOL/L — SIGNIFICANT CHANGE UP (ref 5–17)
BUN SERPL-MCNC: 20 MG/DL — SIGNIFICANT CHANGE UP (ref 7–23)
CALCIUM SERPL-MCNC: 8.4 MG/DL — SIGNIFICANT CHANGE UP (ref 8.4–10.5)
CHLORIDE SERPL-SCNC: 106 MMOL/L — SIGNIFICANT CHANGE UP (ref 96–108)
CO2 SERPL-SCNC: 18 MMOL/L — LOW (ref 22–31)
CREAT SERPL-MCNC: 0.6 MG/DL — SIGNIFICANT CHANGE UP (ref 0.5–1.3)
EGFR: 94 ML/MIN/1.73M2 — SIGNIFICANT CHANGE UP
GLUCOSE SERPL-MCNC: 139 MG/DL — HIGH (ref 70–99)
HCT VFR BLD CALC: 30 % — LOW (ref 39–50)
HGB BLD-MCNC: 9 G/DL — LOW (ref 13–17)
MCHC RBC-ENTMCNC: 27.4 PG — SIGNIFICANT CHANGE UP (ref 27–34)
MCHC RBC-ENTMCNC: 30 G/DL — LOW (ref 32–36)
MCV RBC AUTO: 91.2 FL — SIGNIFICANT CHANGE UP (ref 80–100)
NRBC # BLD: 0 /100 WBCS — SIGNIFICANT CHANGE UP (ref 0–0)
PLATELET # BLD AUTO: 481 K/UL — HIGH (ref 150–400)
POTASSIUM SERPL-MCNC: 4.3 MMOL/L — SIGNIFICANT CHANGE UP (ref 3.5–5.3)
POTASSIUM SERPL-SCNC: 4.3 MMOL/L — SIGNIFICANT CHANGE UP (ref 3.5–5.3)
RAPID RVP RESULT: SIGNIFICANT CHANGE UP
RBC # BLD: 3.29 M/UL — LOW (ref 4.2–5.8)
RBC # FLD: 15.9 % — HIGH (ref 10.3–14.5)
SARS-COV-2 RNA SPEC QL NAA+PROBE: SIGNIFICANT CHANGE UP
SODIUM SERPL-SCNC: 137 MMOL/L — SIGNIFICANT CHANGE UP (ref 135–145)
WBC # BLD: 11.16 K/UL — HIGH (ref 3.8–10.5)
WBC # FLD AUTO: 11.16 K/UL — HIGH (ref 3.8–10.5)

## 2024-12-31 PROCEDURE — 93880 EXTRACRANIAL BILAT STUDY: CPT | Mod: 26

## 2024-12-31 PROCEDURE — 99233 SBSQ HOSP IP/OBS HIGH 50: CPT

## 2024-12-31 PROCEDURE — 99221 1ST HOSP IP/OBS SF/LOW 40: CPT

## 2024-12-31 RX ORDER — ALBUTEROL SULFATE 90 UG/1
1 INHALANT RESPIRATORY (INHALATION) ONCE
Refills: 0 | Status: DISCONTINUED | OUTPATIENT
Start: 2024-12-31 | End: 2025-01-08

## 2024-12-31 RX ORDER — BENZONATATE 100 MG
100 CAPSULE ORAL THREE TIMES A DAY
Refills: 0 | Status: DISCONTINUED | OUTPATIENT
Start: 2024-12-31 | End: 2025-01-08

## 2024-12-31 RX ORDER — ALBUTEROL SULFATE 90 UG/1
2.5 INHALANT RESPIRATORY (INHALATION) ONCE
Refills: 0 | Status: COMPLETED | OUTPATIENT
Start: 2024-12-31 | End: 2024-12-31

## 2024-12-31 RX ORDER — ALBUTEROL SULFATE 90 UG/1
1 INHALANT RESPIRATORY (INHALATION) EVERY 8 HOURS
Refills: 0 | Status: DISCONTINUED | OUTPATIENT
Start: 2024-12-31 | End: 2024-12-31

## 2024-12-31 RX ADMIN — FAMOTIDINE 20 MILLIGRAM(S): 20 TABLET, FILM COATED ORAL at 12:00

## 2024-12-31 RX ADMIN — Medication 4: at 11:59

## 2024-12-31 RX ADMIN — Medication 5 MILLIGRAM(S): at 05:08

## 2024-12-31 RX ADMIN — IPRATROPIUM BROMIDE AND ALBUTEROL SULFATE 3 MILLILITER(S): .5; 2.5 SOLUTION RESPIRATORY (INHALATION) at 22:05

## 2024-12-31 RX ADMIN — SENNOSIDES 2 TABLET(S): 8.6 TABLET, FILM COATED ORAL at 22:05

## 2024-12-31 RX ADMIN — IPRATROPIUM BROMIDE AND ALBUTEROL SULFATE 3 MILLILITER(S): .5; 2.5 SOLUTION RESPIRATORY (INHALATION) at 05:07

## 2024-12-31 RX ADMIN — Medication 100 MILLIGRAM(S): at 22:05

## 2024-12-31 RX ADMIN — DABIGATRAN ETEXILATE 150 MILLIGRAM(S): 110 CAPSULE ORAL at 05:08

## 2024-12-31 RX ADMIN — DABIGATRAN ETEXILATE 150 MILLIGRAM(S): 110 CAPSULE ORAL at 17:45

## 2024-12-31 RX ADMIN — Medication 25 MILLIGRAM(S): at 05:08

## 2024-12-31 RX ADMIN — Medication 1 TABLET(S): at 12:00

## 2024-12-31 RX ADMIN — AMIODARONE HYDROCHLORIDE 100 MILLIGRAM(S): 200 TABLET ORAL at 05:08

## 2024-12-31 RX ADMIN — Medication 2: at 22:04

## 2024-12-31 RX ADMIN — TAMSULOSIN HYDROCHLORIDE 0.4 MILLIGRAM(S): 0.4 CAPSULE ORAL at 22:05

## 2024-12-31 RX ADMIN — ACETAMINOPHEN 1000 MILLIGRAM(S): 80 SOLUTION/ DROPS ORAL at 05:08

## 2024-12-31 RX ADMIN — Medication 81 MILLIGRAM(S): at 12:00

## 2024-12-31 RX ADMIN — ALBUTEROL SULFATE 1 PUFF(S): 90 INHALANT RESPIRATORY (INHALATION) at 10:27

## 2024-12-31 RX ADMIN — IPRATROPIUM BROMIDE AND ALBUTEROL SULFATE 3 MILLILITER(S): .5; 2.5 SOLUTION RESPIRATORY (INHALATION) at 13:38

## 2024-12-31 RX ADMIN — ALBUTEROL SULFATE 2.5 MILLIGRAM(S): 90 INHALANT RESPIRATORY (INHALATION) at 17:45

## 2024-12-31 RX ADMIN — ACETAMINOPHEN 1000 MILLIGRAM(S): 80 SOLUTION/ DROPS ORAL at 13:39

## 2024-12-31 RX ADMIN — ACETAMINOPHEN 1000 MILLIGRAM(S): 80 SOLUTION/ DROPS ORAL at 22:05

## 2024-12-31 RX ADMIN — Medication 100 MILLIGRAM(S): at 13:39

## 2024-12-31 RX ADMIN — LISINOPRIL 20 MILLIGRAM(S): 30 TABLET ORAL at 05:08

## 2024-12-31 NOTE — PROGRESS NOTE ADULT - ASSESSMENT
84/M with CAD, Atrial fibrillation on Pradaxa, CVA (?), DM type 2, HTN and osteoarthritis who presented with chronic left hip pain x 6 months that failed conservative management and admitted for an elective YO by Dr. Echeverria.    #Left hip osteoarthritis   #Post op state  - pain controlled on Tylenol 1g q8h and PRN Tramadol  - Provide adequate analgesia, Incentive spirometry, mobilize with fall precautions, bowel regimen, DVT prophylaxis  - PT/OT    #low flow-low gradient AS  #prior HFmrEF  Satting well on RA, HDS, no wheezing today, bilateral pitting to 1+ LE edema  CXR 12/29: Pulmonary vascular congestion. No pneumothorax. Moderate bilateral pleural effusions. The cardiomediastinal silhouette is suboptimally evaluated. No acute osseous abnormality  ProBNP: 3554  Echo 08/24: Left ventricular systolic function is grossly mildly decreased with an ejection fraction visually estimated at 45 to 50 %. Regional wall motion abnormalities present. Left atrium is moderately dilated. Mild to moderate aortic regurgitation. Moderate aortic stenosis  Stress test 10/24: Normal myocardial perfusion scan, with no evidence of infarction or inducible ischemia  - sp 20mg IV lasix 12/29, PO lasix 40 12/30, deferring diuresis for now given severe AS  - repeat echo: EF 60-65%, low flow low gradient aortic stenosis, valve area 0.52  - consulting structural cardiology today  - Continue metoprolol 25, lisinopril 20  - Follows with cardiologist Dr AVELINO GALICIA, please ensure outpatient follow-up    #persistent dry cough  - send RVP today, trial tessalon perles for symptoms  - SLP eval today, per friends bedside may be associated w ptn eating    #Constipation  - abdomen Xray no obstruction nor free air  - BM reported by the patient   - mobilize  - bowel regimen with Miralax BID and Senna once at night    #Acute blood loss anemia  - Monitor Hgb; no reported bleeding symptoms   - Transfuse for Hgb < 7  - Ensure Type and Screen available    #Urinary retention  - Lee catheter dced, TOV, on flomax per urology  - ensure adequate bowel regimen in place    #Leucocytosis, stable  - likely reactive  - afebrile and denies symptoms of infection  - monitor temp and WBC trend    #CAD  #Atrial fibrillation  - rate controlled on Metoprolol, Amiodarone 100  - on dabigatran 150 q12    #HTN  - on Metoprolol, Amlodipine and Lisinopril  - ensure BP parameters in place    #DM type 2  - A1C 7, controlled  - on correctional insulin; goal glucose 100-180 mg/dL  - can resume Metformin on discharge  - DM diet    DVT ppx: Dabigatran

## 2024-12-31 NOTE — CONSULT NOTE ADULT - ASSESSMENT
84-year-old male with a past medical history of coronary artery disease (status post stent), cerebral vascular accident, atrial fibrillation (on Pradaxa and baby aspirin), benign prostatic hyperplasia (status post transurethral resection of the prostate), and prostate cancer (status post radiation and androgen deprivation therapy). Patient was admitted for elective left total hip replacement given left hip pain x6 months without incident or injury. On 12/17, he underwent a left hip replacement. Patient had on echo on 12/30 he had an echo performed which demonstrated on paradoxical low flow low gradient aortic stenosis. Structural heart consulted for possible intervention on aortic stenosis.     Problem #1: low flow low gradient Aortic stenosis   - plan to be discuss with Dr. Ribeiro.   - given 2 person assist will likely need rehabilitation prior to any TAVR intervention. Could possibly do BAV.   - can obtain carotid US and chest Xray for now. TBD on TAVR scans.     Problem #2: Atrial fibrillation   - continue Pradaxa for now. If patient is a candidate for intervention on this admission will need to switch to heparin.   - continue rate control per primary team     Problem #3: history of CAD   - continue asa. Consider statin.    Problem #4: s/p L hip replacement   - care per primary team     84-year-old male with a past medical history of coronary artery disease (status post stent), cerebral vascular accident, atrial fibrillation (on Pradaxa and baby aspirin), benign prostatic hyperplasia (status post transurethral resection of the prostate), and prostate cancer (status post radiation and androgen deprivation therapy). Patient was admitted for elective left total hip replacement given left hip pain x6 months without incident or injury. On 12/17, he underwent a left hip replacement. Patient had on echo on 12/30 he had an echo performed which demonstrated on paradoxical low flow low gradient aortic stenosis. Structural heart consulted for possible intervention on aortic stenosis.     Problem #1: low flow low gradient Aortic stenosis   - plan to be discuss with Dr. Ribeiro.   - given 2 person assist will likely need rehabilitation prior to any TAVR intervention. Patient should follow up as an outpatient. Please give patient information at discharge to the outpatient office. [phone]/     Problem #2: Atrial fibrillation   - continue Pradaxa for now. If patient is a candidate for intervention on this admission will need to switch to heparin.   - continue rate control per primary team     Problem #3: history of CAD   - continue asa. Consider statin.    Problem #4: s/p L hip replacement   - care per primary team

## 2024-12-31 NOTE — PROGRESS NOTE ADULT - SUBJECTIVE AND OBJECTIVE BOX
Patient is a 84y old  Male who presents with a chief complaint of left hip pain (31 Dec 2024 06:22)        SUBJECTIVE:  Patient was seen and examined at bedside, denies CP, SOB, no acute complaints save for persistent cough.  Discussed w ptn friends at bedside, friends concerned for aspiration given ptn appears to cough more w eating.  Ptn and friends updated of echo findings, all questions answered.    Overnight Events : TTE w EF 60-65%, low flow low gradient aortic stenosis, valve area 0.52    Last Bowel Movement: 30-Dec-2024 (12-31)  Last Bowel Movement: 30-Dec-2024 (12-30)  Last Bowel Movement: 28-Dec-2024 (12-30)  Last Bowel Movement: 28-Dec-2024 (12-29)  Last Bowel Movement: 28-Dec-2024 (12-29)  Last Bowel Movement: 28-Dec-2024 (12-28)  Last Bowel Movement: 27-Dec-2024 (12-28)  Last Bowel Movement: 25-Dec-2024 (12-27)  Last Bowel Movement: 25-Dec-2024 (12-27)  Last Bowel Movement: 25-Dec-2024 (12-26)  Last Bowel Movement: 25-Dec-2024 (12-26)  Last Bowel Movement: 23-Dec-2024 (12-25)  Last Bowel Movement: 23-Dec-2024 (12-25)  Last Bowel Movement: 17-Dec-2024 (12-24)      Review of systems: 12 point Review of systems negative unless otherwise documented elsewhere in note.     Diet, Consistent Carbohydrate/No Snacks:   Supplement Feeding Modality:  Oral  Ensure Max Cans or Servings Per Day:  1       Frequency:  Three Times a day (12-20-24 @ 13:26) [Active]      MEDICATIONS:  MEDICATIONS  (STANDING):  acetaminophen     Tablet .. 1000 milliGRAM(s) Oral every 8 hours  albuterol    90 MICROgram(s) HFA Inhaler 1 Puff(s) Inhalation every 8 hours  albuterol/ipratropium for Nebulization 3 milliLiter(s) Nebulizer every 8 hours  aMIOdarone    Tablet 100 milliGRAM(s) Oral daily  amLODIPine   Tablet 5 milliGRAM(s) Oral daily  aspirin enteric coated 81 milliGRAM(s) Oral daily  benzonatate 100 milliGRAM(s) Oral three times a day  dabigatran 150 milliGRAM(s) Oral every 12 hours  dextrose 5%. 1000 milliLiter(s) (100 mL/Hr) IV Continuous <Continuous>  dextrose 5%. 1000 milliLiter(s) (50 mL/Hr) IV Continuous <Continuous>  dextrose 50% Injectable 25 Gram(s) IV Push once  dextrose 50% Injectable 12.5 Gram(s) IV Push once  dextrose 50% Injectable 25 Gram(s) IV Push once  famotidine    Tablet 20 milliGRAM(s) Oral daily  furosemide    Tablet 40 milliGRAM(s) Oral once  glucagon  Injectable 1 milliGRAM(s) IntraMuscular once  HYDROmorphone  Injectable 0.5 milliGRAM(s) IV Push once  insulin lispro (ADMELOG) corrective regimen sliding scale   SubCutaneous Before meals and at bedtime  lisinopril 20 milliGRAM(s) Oral daily  metoprolol succinate ER 25 milliGRAM(s) Oral daily  multivitamin 1 Tablet(s) Oral daily  polyethylene glycol 3350 17 Gram(s) Oral two times a day  senna 2 Tablet(s) Oral at bedtime  tamsulosin 0.4 milliGRAM(s) Oral at bedtime    MEDICATIONS  (PRN):  albuterol    90 MICROgram(s) HFA Inhaler 1 Puff(s) Inhalation three times a day PRN Wheezing  benzocaine/menthol Lozenge 1 Lozenge Oral every 6 hours PRN Sore Throat  calcium carbonate    500 mG (Tums) Chewable 1 Tablet(s) Chew daily PRN Heartburn  dextrose Oral Gel 15 Gram(s) Oral once PRN Blood Glucose LESS THAN 70 milliGRAM(s)/deciliter  magnesium hydroxide Suspension 30 milliLiter(s) Oral daily PRN Constipation  ondansetron Injectable 4 milliGRAM(s) IV Push every 6 hours PRN Nausea and/or Vomiting      Allergies    statins (Hepatotoxicity)    Intolerances        OBJECTIVE:  Vital Signs Last 24 Hrs  T(C): 36.6 (31 Dec 2024 08:46), Max: 36.9 (30 Dec 2024 17:14)  T(F): 97.8 (31 Dec 2024 08:46), Max: 98.4 (30 Dec 2024 17:14)  HR: 83 (31 Dec 2024 08:46) (78 - 83)  BP: 115/64 (31 Dec 2024 08:46) (112/57 - 133/76)  BP(mean): --  RR: 18 (31 Dec 2024 08:46) (17 - 19)  SpO2: 95% (31 Dec 2024 08:46) (94% - 100%)    Parameters below as of 31 Dec 2024 08:46  Patient On (Oxygen Delivery Method): room air      I&O's Summary    30 Dec 2024 07:01  -  31 Dec 2024 07:00  --------------------------------------------------------  IN: 300 mL / OUT: 1551 mL / NET: -1251 mL        PHYSICAL EXAM:  Gen: Resting in bed at time of exam, not in distress   HEENT: moist mucosa, no lesions   Neck: supple, trachea at midline  CV: +S1/S2, +3 systolic blowing murmur  Pulm: no wheezing , no crackles  no increase in work of breathing  Abd: soft, NTND  Skin: warm and dry, no new rashes   Ext: moving all 4 extremities spontaneously , no edema  ,  Neuro: AOx3, no gross focal neurological deficits  Psych: affect and behavior appropriate, pleasant at time of interview    LABS:                        9.0    11.16 )-----------( 481      ( 31 Dec 2024 05:30 )             30.0     12-31    137  |  106  |  20  ----------------------------<  139[H]  4.3   |  18[L]  |  0.60    Ca    8.4      31 Dec 2024 05:30          CAPILLARY BLOOD GLUCOSE      POCT Blood Glucose.: 126 mg/dL (31 Dec 2024 06:56)  POCT Blood Glucose.: 153 mg/dL (30 Dec 2024 22:34)  POCT Blood Glucose.: 173 mg/dL (30 Dec 2024 17:50)  POCT Blood Glucose.: 137 mg/dL (30 Dec 2024 13:28)    Urinalysis Basic - ( 31 Dec 2024 05:30 )    Color: x / Appearance: x / SG: x / pH: x  Gluc: 139 mg/dL / Ketone: x  / Bili: x / Urobili: x   Blood: x / Protein: x / Nitrite: x   Leuk Esterase: x / RBC: x / WBC x   Sq Epi: x / Non Sq Epi: x / Bacteria: x        MICRODATA:      RADIOLOGY/OTHER STUDIES:

## 2024-12-31 NOTE — PROGRESS NOTE ADULT - SUBJECTIVE AND OBJECTIVE BOX
Ortho Note    Pt seen and examined at bedside during AM rounds. Pt reports cough has worsened today. States cough is dry without phlegm production, associated with mild sob, significantly improved with nebulizers. Tolerating PO diet. Denies runny nose, sore throat.   Denies CP, N/V, new numbness/tingling     Vital Signs Last 24 Hrs  T(C): 36.6 (12-31-24 @ 08:46), Max: 36.6 (12-31-24 @ 08:46)  T(F): 97.8 (12-31-24 @ 08:46), Max: 97.8 (12-31-24 @ 08:46)  HR: 83 (12-31-24 @ 08:46) (83 - 83)  BP: 115/64 (12-31-24 @ 08:46) (115/64 - 115/64)  BP(mean): --  RR: 18 (12-31-24 @ 08:46) (18 - 18)  SpO2: 95% (12-31-24 @ 08:46) (95% - 95%)  I&O's Summary    30 Dec 2024 07:01  -  31 Dec 2024 07:00  --------------------------------------------------------  IN: 300 mL / OUT: 1551 mL / NET: -1251 mL        General: Pt Alert and oriented, NAD  Pulses: 2+ DP bilaterally  Calves soft and nontender bilaterally  Sensation: SILT distally bilateral lower extremities  Motor: EHL/FHL/TA/GS: 5/5 bilaterally                          9.0    11.16 )-----------( 481      ( 31 Dec 2024 05:30 )             30.0     12-31    137  |  106  |  20  ----------------------------<  139[H]  4.3   |  18[L]  |  0.60    Ca    8.4      31 Dec 2024 05:30      TTE 12/30/24  "FINDINGS:    Left Ventricle:  The left ventricle is normal in size, wall thickness, and systolic   function with a calculated ejection fraction of 60-65%. There are no   regional wall motion abnormalities seen. Analysis of left ventricular   diastolic function and filling pressure is made challenging by the   presence of A-fib.    Right Ventricle:  The right ventricle is normal in size. Right ventricular systolic   function is normal. The tricuspid annular plane systolic excursion   (TAPSE) is 7.12 mm (normal >=17 mm). RV tissue Doppler S' is 8.70 cm/s   (normal >10 cm/s).    Left Atrium:  The left atrium is normal in size. Left atrial volume index (ERIC) is   25.8 ml/m².    Right Atrium:  The right atrium is normal in size.    Aortic Valve:  The aorticleaflets are thickened and calcified with reduced systolic   leaflet excursion. There is severe aortic stenosis. The peak   transvalvular velocity is 3.56 m/s, the mean transvalvular gradient is   24.00 mmHg, and the LVOT/AV velocity ratio is 0.20. The peak transaortic   gradient is 50.69 mmHg. The aortic valve area (estimated via the   continuity method) is 0.52 cm². The calculated aortic valve area indexed   to body surface area is 0.29 cm²/m². The calculated left ventricular   stroke volume index is 22.00 ml/m² (normal >35 ml/m2). There is trace   aortic regurgitation.    Mitral Valve:  Structurally normal mitral valve with normal leaflet excursion. There is   mild mitral regurgitation.    Tricuspid Valve:  Structurally normal tricuspid valve with normal leaflet excursion. There   is mild tricuspid regurgitation. Pulmonary artery systolic pressure   (estimated using the tricuspid regurgitant gradient and an estimate of   right atrial pressure) is 47 mmHg.    Inferior Vena Cava:  The inferior vena cava is normal in size (<2.1cm) with normal inspiratory   collapse (>50%) consistent with normal right atrial pressure (  3, range 0-5mmHg).    Pulmonic Valve:  Structurally normal pulmonic valve with normal leaflet excursion. There   is trace pulmonic regurgitation.    Aorta:  The aortic root is normal in size.    Pericardium:  No pericardial effusion is seen.    ---------------------------------------------------------------------------  -----  Robert Reyes MD    Electronically signed by Robert Reyes MD  Signature Date/Time: 12/30/2024/5:21:43 PM        *** Final ***"    A/P: 84yoM POD#14 s/p Left YO by Dr. SATISH Echeverria on 12/17  - Afebrile, Wbcs 11.16  - Appreciate medicine recs  - Pain control   - DVT Ppx: Pradaxa 150mg PO Q12h, ASA 81mg PO daily   - Pt progress: 2 person assist   - Bowel regimen, IS use, PPI   - Lee discontinued on 12/30, passed TOV, continue with condom cath  - Dry non productive cough, chest xray from 12/29 IMPRESSION: Pulmonary vascular congestion. No pneumothorax. Small bilateral pleural effusions. -lasix 40mg PO x1 given yesterday, tessalon perle started  - Echo with worsened aortic stenosis: structural heart team consulted per med  - Weight Bearing Status: Protected Weight Bearing, no active abduction of left hip, no abduction pillow   - Dispo: out of pocket [facility] vs HPT with assistance, pending medical clearance, family meeting with HCP sarah in [place] to discuss disposition     Ortho Pager [phone] Ortho Note    Pt seen and examined at bedside during AM rounds. Pt reports cough has worsened today. States cough is dry without phlegm production, associated with mild sob, significantly improved with nebulizers. Tolerating PO diet. Denies runny nose, sore throat.   Denies CP, N/V, new numbness/tingling     Vital Signs Last 24 Hrs  T(C): 36.6 (12-31-24 @ 08:46), Max: 36.6 (12-31-24 @ 08:46)  T(F): 97.8 (12-31-24 @ 08:46), Max: 97.8 (12-31-24 @ 08:46)  HR: 83 (12-31-24 @ 08:46) (83 - 83)  BP: 115/64 (12-31-24 @ 08:46) (115/64 - 115/64)  BP(mean): --  RR: 18 (12-31-24 @ 08:46) (18 - 18)  SpO2: 95% (12-31-24 @ 08:46) (95% - 95%)  I&O's Summary    30 Dec 2024 07:01  -  31 Dec 2024 07:00  --------------------------------------------------------  IN: 300 mL / OUT: 1551 mL / NET: -1251 mL        General: Pt Alert and oriented, NAD  Pulses: 2+ DP bilaterally  Calves soft and nontender bilaterally  Sensation: SILT distally bilateral lower extremities  Motor: EHL/FHL/TA/GS: 5/5 bilaterally                          9.0    11.16 )-----------( 481      ( 31 Dec 2024 05:30 )             30.0     12-31    137  |  106  |  20  ----------------------------<  139[H]  4.3   |  18[L]  |  0.60    Ca    8.4      31 Dec 2024 05:30      TTE 12/30/24  "FINDINGS:    Left Ventricle:  The left ventricle is normal in size, wall thickness, and systolic   function with a calculated ejection fraction of 60-65%. There are no   regional wall motion abnormalities seen. Analysis of left ventricular   diastolic function and filling pressure is made challenging by the   presence of A-fib.    Right Ventricle:  The right ventricle is normal in size. Right ventricular systolic   function is normal. The tricuspid annular plane systolic excursion   (TAPSE) is 7.12 mm (normal >=17 mm). RV tissue Doppler S' is 8.70 cm/s   (normal >10 cm/s).    Left Atrium:  The left atrium is normal in size. Left atrial volume index (ERIC) is   25.8 ml/m².    Right Atrium:  The right atrium is normal in size.    Aortic Valve:  The aorticleaflets are thickened and calcified with reduced systolic   leaflet excursion. There is severe aortic stenosis. The peak   transvalvular velocity is 3.56 m/s, the mean transvalvular gradient is   24.00 mmHg, and the LVOT/AV velocity ratio is 0.20. The peak transaortic   gradient is 50.69 mmHg. The aortic valve area (estimated via the   continuity method) is 0.52 cm². The calculated aortic valve area indexed   to body surface area is 0.29 cm²/m². The calculated left ventricular   stroke volume index is 22.00 ml/m² (normal >35 ml/m2). There is trace   aortic regurgitation.    Mitral Valve:  Structurally normal mitral valve with normal leaflet excursion. There is   mild mitral regurgitation.    Tricuspid Valve:  Structurally normal tricuspid valve with normal leaflet excursion. There   is mild tricuspid regurgitation. Pulmonary artery systolic pressure   (estimated using the tricuspid regurgitant gradient and an estimate of   right atrial pressure) is 47 mmHg.    Inferior Vena Cava:  The inferior vena cava is normal in size (<2.1cm) with normal inspiratory   collapse (>50%) consistent with normal right atrial pressure (  3, range 0-5mmHg).    Pulmonic Valve:  Structurally normal pulmonic valve with normal leaflet excursion. There   is trace pulmonic regurgitation.    Aorta:  The aortic root is normal in size.    Pericardium:  No pericardial effusion is seen.    ---------------------------------------------------------------------------  -----  Robert Reyes MD    Electronically signed by Robert Reyes MD  Signature Date/Time: 12/30/2024/5:21:43 PM        *** Final ***"    A/P: 84yoM POD#14 s/p Left YO by Dr. SATISH Echeverria on 12/17  - Afebrile, Wbcs 11.16  - Appreciate medicine recs  - Pain control   - DVT Ppx: Pradaxa 150mg PO Q12h, ASA 81mg PO daily   - Pt progress: 2 person assist   - Bowel regimen, IS use, PPI   - Lee discontinued on 12/30, passed TOV, continue with condom cath  - Dry non productive cough, chest xray from 12/29 IMPRESSION: Pulmonary vascular congestion. No pneumothorax. Small bilateral pleural effusions. -lasix 40mg PO x1 given yesterday, tessalon perle started  - Echo with worsened aortic stenosis: structural heart team consulted per med  - Weight Bearing Status: Protected Weight Bearing, no active abduction of left hip, no abduction pillow  - Dispo: out of pocket [facility] vs HPT with assistance, pending medical clearance, family meeting with HCP sarah in [place] to discuss disposition     Ortho Pager [phone] Ortho Note    Pt seen and examined at bedside during AM rounds. Pt reports cough has worsened today. States cough is dry without phlegm production, associated with mild sob, significantly improved with nebulizers. Tolerating PO diet. Denies runny nose, sore throat.   Denies CP, N/V, new numbness/tingling     Vital Signs Last 24 Hrs  T(C): 36.6 (12-31-24 @ 08:46), Max: 36.6 (12-31-24 @ 08:46)  T(F): 97.8 (12-31-24 @ 08:46), Max: 97.8 (12-31-24 @ 08:46)  HR: 83 (12-31-24 @ 08:46) (83 - 83)  BP: 115/64 (12-31-24 @ 08:46) (115/64 - 115/64)  BP(mean): --  RR: 18 (12-31-24 @ 08:46) (18 - 18)  SpO2: 95% (12-31-24 @ 08:46) (95% - 95%)  I&O's Summary    30 Dec 2024 07:01  -  31 Dec 2024 07:00  --------------------------------------------------------  IN: 300 mL / OUT: 1551 mL / NET: -1251 mL        General: Pt Alert and oriented, NAD  Pulses: 2+ DP bilaterally  Calves soft and nontender bilaterally  Sensation: SILT distally bilateral lower extremities  Motor: EHL/FHL/TA/GS: 5/5 bilaterally                          9.0    11.16 )-----------( 481      ( 31 Dec 2024 05:30 )             30.0     12-31    137  |  106  |  20  ----------------------------<  139[H]  4.3   |  18[L]  |  0.60    Ca    8.4      31 Dec 2024 05:30      TTE 12/30/24  "FINDINGS:    Left Ventricle:  The left ventricle is normal in size, wall thickness, and systolic   function with a calculated ejection fraction of 60-65%. There are no   regional wall motion abnormalities seen. Analysis of left ventricular   diastolic function and filling pressure is made challenging by the   presence of A-fib.    Right Ventricle:  The right ventricle is normal in size. Right ventricular systolic   function is normal. The tricuspid annular plane systolic excursion   (TAPSE) is 7.12 mm (normal >=17 mm). RV tissue Doppler S' is 8.70 cm/s   (normal >10 cm/s).    Left Atrium:  The left atrium is normal in size. Left atrial volume index (ERIC) is   25.8 ml/m².    Right Atrium:  The right atrium is normal in size.    Aortic Valve:  The aorticleaflets are thickened and calcified with reduced systolic   leaflet excursion. There is severe aortic stenosis. The peak   transvalvular velocity is 3.56 m/s, the mean transvalvular gradient is   24.00 mmHg, and the LVOT/AV velocity ratio is 0.20. The peak transaortic   gradient is 50.69 mmHg. The aortic valve area (estimated via the   continuity method) is 0.52 cm². The calculated aortic valve area indexed   to body surface area is 0.29 cm²/m². The calculated left ventricular   stroke volume index is 22.00 ml/m² (normal >35 ml/m2). There is trace   aortic regurgitation.    Mitral Valve:  Structurally normal mitral valve with normal leaflet excursion. There is   mild mitral regurgitation.    Tricuspid Valve:  Structurally normal tricuspid valve with normal leaflet excursion. There   is mild tricuspid regurgitation. Pulmonary artery systolic pressure   (estimated using the tricuspid regurgitant gradient and an estimate of   right atrial pressure) is 47 mmHg.    Inferior Vena Cava:  The inferior vena cava is normal in size (<2.1cm) with normal inspiratory   collapse (>50%) consistent with normal right atrial pressure (  3, range 0-5mmHg).    Pulmonic Valve:  Structurally normal pulmonic valve with normal leaflet excursion. There   is trace pulmonic regurgitation.    Aorta:  The aortic root is normal in size.    Pericardium:  No pericardial effusion is seen.    ---------------------------------------------------------------------------  -----  Robert Reyes MD    Electronically signed by Robert Reyes MD  Signature Date/Time: 12/30/2024/5:21:43 PM        *** Final ***"    A/P: 84yoM POD#14 s/p Left YO by Dr. SATISH Echeverria on 12/17  - Afebrile, Wbcs 11.16  - Appreciate medicine recs  - Pain control   - DVT Ppx: Pradaxa 150mg PO Q12h, ASA 81mg PO daily   - Pt progress: 2 person assist   - Bowel regimen, IS use, PPI   - Lee discontinued on 12/30, passed TOV, continue with condom cath  - Dry non productive cough, chest xray from 12/29 IMPRESSION: Pulmonary vascular congestion. No pneumothorax. Small bilateral pleural effusions. -lasix 40mg PO x1 given yesterday, tessalon blaire started  - Echo with severe aortic stenosis: structural heart team consulted per med, follow up recs  - Weight Bearing Status: Protected Weight Bearing, no active abduction of left hip, no abduction pillow  - Dispo: out of pocket [facility] vs HPT with assistance, pending medical clearance, family meeting with HCP sarah in [place] to discuss disposition     Ortho Pager [phone]

## 2024-12-31 NOTE — PROGRESS NOTE ADULT - SUBJECTIVE AND OBJECTIVE BOX
Ortho Note    Pt comfortable without complaints, pain controlled  Denies CP, SOB, N/V, numbness/tingling     Vital Signs Last 24 Hrs  T(C): 36.7 (27 Dec 2024 05:00), Max: 36.8 (26 Dec 2024 10:00)  T(F): 98 (27 Dec 2024 05:00), Max: 98.2 (26 Dec 2024 10:00)  HR: 69 (27 Dec 2024 05:00) (67 - 95)  BP: 134/53 (27 Dec 2024 05:00) (125/60 - 140/78)  BP(mean): --  RR: 18 (27 Dec 2024 05:00) (16 - 19)  SpO2: 96% (27 Dec 2024 05:00) (96% - 100%)    Parameters below as of 27 Dec 2024 05:00  Patient On (Oxygen Delivery Method): room air      General: Pt Alert and oriented, NAD  LLE   DSG C/D/I  Pulses: 2+ DP   Sensation: silt sural/saph/dpn/spn/tib   Motor: Quad/Ham/EHL/FHL/TA/GS 5/5 in strength                    84yM s/p Left YO by Dr. SATISH Echeverria on 12-17  - Weight Bearing Status: Protected Weight Bearing, no active abduction of left hip, no abduction pillow  - Pain control  - DVT PPx: Pradaxa, ASA  - F/u AM labs  -Pt progress:2 person assist   Dispo: out of pocket [facility] vs HPT , plan for interdisciplinary discussion on dispo status on 12/30   - voiding freely s/p larry hightower on 12/30     Ortho Pager [phone]

## 2024-12-31 NOTE — SWALLOW BEDSIDE ASSESSMENT ADULT - NS SPL SWALLOW CLINIC TRIAL FT
Adequate bolus containment, processing, and clearance. Laryngeal movement palpated. Patient denied globus sensation. No clinical overt s/s of penetration/aspiration appreciated.

## 2024-12-31 NOTE — SWALLOW BEDSIDE ASSESSMENT ADULT - SLP GENERAL OBSERVATIONS
Awake, sitting upright in chair, breathing RA. Vocal quality is hoarse and raspy. Cousin and HHA at bedside. The patient endorsed a non-productive cough, which is exacerbated with eating, drinking, and occasionally when talking, as well as voice changes, which have been present since extubation on 12/17. No pre-existing dysphagia complaints.

## 2024-12-31 NOTE — CONSULT NOTE ADULT - SUBJECTIVE AND OBJECTIVE BOX
Surgeon: Quintin     Requesting Physician: Oscar Echeverria    HISTORY OF PRESENT ILLNESS:   84-year-old male with a past medical history of coronary artery disease (status post stent), cerebral vascular accident, atrial fibrillation (on Pradaxa and baby aspirin), benign prostatic hyperplasia (status post transurethral resection of the prostate), and prostate cancer (status post radiation and androgen deprivation therapy). Patient was admitted for elective left total hip replacement given left hip pain x6 months without incident or injury. On 12/17, he underwent a left hip replacement. Patient had on echo on 12/30 he had an echo performed which demonstrated on paradoxical low flow low gradient aortic stenosis. Structural heart consulted for possible intervention on aortic stenosis.       PAST MEDICAL & SURGICAL HISTORY:  Renal artery stenosis      Afib      HTN (hypertension)      Hepatitis      DM (diabetes mellitus)      Intermittent claudication      CVA (cerebral vascular accident)  2014      Osteoarthritis      History of tonsillectomy      History of femoral angiogram  with stent placement      H/O eye surgery  b/l cataracts          MEDICATIONS  (STANDING):  acetaminophen     Tablet .. 1000 milliGRAM(s) Oral every 8 hours  albuterol    90 MICROgram(s) HFA Inhaler 1 Puff(s) Inhalation every 8 hours  albuterol/ipratropium for Nebulization 3 milliLiter(s) Nebulizer every 8 hours  aMIOdarone    Tablet 100 milliGRAM(s) Oral daily  amLODIPine   Tablet 5 milliGRAM(s) Oral daily  aspirin enteric coated 81 milliGRAM(s) Oral daily  benzonatate 100 milliGRAM(s) Oral three times a day  dabigatran 150 milliGRAM(s) Oral every 12 hours  dextrose 5%. 1000 milliLiter(s) (50 mL/Hr) IV Continuous <Continuous>  dextrose 5%. 1000 milliLiter(s) (100 mL/Hr) IV Continuous <Continuous>  dextrose 50% Injectable 25 Gram(s) IV Push once  dextrose 50% Injectable 12.5 Gram(s) IV Push once  dextrose 50% Injectable 25 Gram(s) IV Push once  famotidine    Tablet 20 milliGRAM(s) Oral daily  furosemide    Tablet 40 milliGRAM(s) Oral once  glucagon  Injectable 1 milliGRAM(s) IntraMuscular once  HYDROmorphone  Injectable 0.5 milliGRAM(s) IV Push once  insulin lispro (ADMELOG) corrective regimen sliding scale   SubCutaneous Before meals and at bedtime  lisinopril 20 milliGRAM(s) Oral daily  metoprolol succinate ER 25 milliGRAM(s) Oral daily  multivitamin 1 Tablet(s) Oral daily  polyethylene glycol 3350 17 Gram(s) Oral two times a day  senna 2 Tablet(s) Oral at bedtime  tamsulosin 0.4 milliGRAM(s) Oral at bedtime    MEDICATIONS  (PRN):  albuterol    90 MICROgram(s) HFA Inhaler 1 Puff(s) Inhalation three times a day PRN Wheezing  benzocaine/menthol Lozenge 1 Lozenge Oral every 6 hours PRN Sore Throat  calcium carbonate    500 mG (Tums) Chewable 1 Tablet(s) Chew daily PRN Heartburn  dextrose Oral Gel 15 Gram(s) Oral once PRN Blood Glucose LESS THAN 70 milliGRAM(s)/deciliter  magnesium hydroxide Suspension 30 milliLiter(s) Oral daily PRN Constipation  ondansetron Injectable 4 milliGRAM(s) IV Push every 6 hours PRN Nausea and/or Vomiting      Allergies    statins (Hepatotoxicity)    Intolerances        SOCIAL HISTORY:  Denies smoking history, recreational drug use, etoh use    FAMILY HISTORY:      Review of Systems  CONSTITUTIONAL:  Fevers / chills, sweats, fatigue, weight loss, weight gain                                    NEGATIVE  NEURO:  parathesias, seizures, syncope, confusion                                                                               NEGATIVE  EYES:  Blurry vision, discharge, pain, loss of vision                                                                                  NEGATIVE  ENMT:  Difficulty hearing, vertigo, dysphagia, epistaxis, recent dental work                                     NEGATIVE  CV:  Chest pain, palpitations, SAMUEL, orthopnea                                                                                         NEGATIVE  RESPIRATORY:  Wheezing, SOB, cough / sputum, hemoptysis                                                              NEGATIVE  GI:  Nausea, vommiting, diarrhea, constipation, melena                                                                        NEGATIVE  : Hematuria, dysuria, urgency, incontinence                                                                                       NEGATIVE  MUSKULOSKELETAL:  arthritis, joint swelling, muscle weakness                                                           NEGATIVE  SKIN/BREAST:  rash, itching, gerard loss, masses                                                                                            NEGATIVE  PSYCH:  depresion, anxiety, suicidal ideation                                                                                             NEGATIVE  HEME/LYMPH:  bruises easily, enlarged lymph nodes, tender lymph nodes                                        NEGATIVE  ENDOCRINE:  cold intolerance, heat intolerance, polydipsia                                                                   NEGATIVE    PHYSICAL EXAM  Vital Signs Last 24 Hrs  T(C): 36.7 (31 Dec 2024 12:51), Max: 36.9 (30 Dec 2024 17:14)  T(F): 98.1 (31 Dec 2024 12:51), Max: 98.4 (30 Dec 2024 17:14)  HR: 82 (31 Dec 2024 12:51) (78 - 83)  BP: 147/65 (31 Dec 2024 12:51) (112/57 - 147/65)  BP(mean): --  RR: 19 (31 Dec 2024 12:51) (17 - 19)  SpO2: 100% (31 Dec 2024 12:51) (94% - 100%)    Parameters below as of 31 Dec 2024 08:46  Patient On (Oxygen Delivery Method): room air        CONSTITUTIONAL:                                                                          WNL  NEURO:                                                                                             WNL                      EYES:                                                                                                  WNL  ENMT:                                                                                                WNL  CV:                                                                                                      WNL  RESPIRATORY:                                                                                  WNL  GI:                                                                                                       WNL  : ROSARIO + / -                                                                                 WNL  MUSKULOSKELETAL:                                                                       WNL  SKIN / BREAST:                                                                                 WNL                                                          LABS:                        9.0    11.16 )-----------( 481      ( 31 Dec 2024 05:30 )             30.0     12-31    137  |  106  |  20  ----------------------------<  139[H]  4.3   |  18[L]  |  0.60    Ca    8.4      31 Dec 2024 05:30        Urinalysis Basic - ( 31 Dec 2024 05:30 )    Color: x / Appearance: x / SG: x / pH: x  Gluc: 139 mg/dL / Ketone: x  / Bili: x / Urobili: x   Blood: x / Protein: x / Nitrite: x   Leuk Esterase: x / RBC: x / WBC x   Sq Epi: x / Non Sq Epi: x / Bacteria: x              RADIOLOGY & ADDITIONAL STUDIES:  CAROTID U/S: n/a     CXR: < from: Xray Chest 1 View- PORTABLE-Urgent (Xray Chest 1 View- PORTABLE-Urgent .) (12.29.24 @ 11:28) >  IMPRESSION: The cardiac silhouette is enlarged. Small bilateral pleural   effusions with bilateral airspace disease compatible with pulmonary edema.    --- End of Report ---    < end of copied text >      CT Scan: n/a     EKG: afib     TTE / WALTER: < from: TTE Echo Complete w/ Contrast w/ Doppler (12.30.24 @ 16:35) >  CONCLUSIONS:     1. Normal left ventricular size and systolic function.   2. Normal right ventricular size and systolic function.   3. Normal atria.   4. Severe aortic stenosis (paradoxical low-flow, low-gradient). The peak   transvalvular velocity is 3.56 m/s, the mean transvalvular gradient is   24.00 mmHg, and the LVOT/AV velocity ratio is 0.20. The peak transaortic   gradient is 50.69 mmHg. The aortic valve area (estimated via the   continuity method) is 0.52 cm².   5. The calculated left ventricular stroke volume index is 22.00 ml/m²   (normal >35 ml/m2).   6. Pulmonary hypertension present, pulmonary artery systolic pressure is   47 mmHg.   7. No pericardial effusion.   8. No prior echo is available for comparison.    < end of copied text >      Cardiac Cath: n/a  Surgeon: Quintin     Requesting Physician: Oscar Echeverria    HISTORY OF PRESENT ILLNESS:   84-year-old male with a past medical history of coronary artery disease (status post stent), cerebral vascular accident, atrial fibrillation (on Pradaxa and baby aspirin), benign prostatic hyperplasia (status post transurethral resection of the prostate), and prostate cancer (status post radiation and androgen deprivation therapy). Patient was admitted for elective left total hip replacement given left hip pain x6 months without incident or injury. On 12/17, he underwent a left hip replacement. Patient had on echo on 12/30 he had an echo performed which demonstrated on paradoxical low flow low gradient aortic stenosis. Structural heart consulted for possible intervention on aortic stenosis.       PAST MEDICAL & SURGICAL HISTORY:  Renal artery stenosis      Afib      HTN (hypertension)      Hepatitis      DM (diabetes mellitus)      Intermittent claudication      CVA (cerebral vascular accident)  2014      Osteoarthritis      History of tonsillectomy      History of femoral angiogram  with stent placement      H/O eye surgery  b/l cataracts          MEDICATIONS  (STANDING):  acetaminophen     Tablet .. 1000 milliGRAM(s) Oral every 8 hours  albuterol    90 MICROgram(s) HFA Inhaler 1 Puff(s) Inhalation every 8 hours  albuterol/ipratropium for Nebulization 3 milliLiter(s) Nebulizer every 8 hours  aMIOdarone    Tablet 100 milliGRAM(s) Oral daily  amLODIPine   Tablet 5 milliGRAM(s) Oral daily  aspirin enteric coated 81 milliGRAM(s) Oral daily  benzonatate 100 milliGRAM(s) Oral three times a day  dabigatran 150 milliGRAM(s) Oral every 12 hours  dextrose 5%. 1000 milliLiter(s) (50 mL/Hr) IV Continuous <Continuous>  dextrose 5%. 1000 milliLiter(s) (100 mL/Hr) IV Continuous <Continuous>  dextrose 50% Injectable 25 Gram(s) IV Push once  dextrose 50% Injectable 12.5 Gram(s) IV Push once  dextrose 50% Injectable 25 Gram(s) IV Push once  famotidine    Tablet 20 milliGRAM(s) Oral daily  furosemide    Tablet 40 milliGRAM(s) Oral once  glucagon  Injectable 1 milliGRAM(s) IntraMuscular once  HYDROmorphone  Injectable 0.5 milliGRAM(s) IV Push once  insulin lispro (ADMELOG) corrective regimen sliding scale   SubCutaneous Before meals and at bedtime  lisinopril 20 milliGRAM(s) Oral daily  metoprolol succinate ER 25 milliGRAM(s) Oral daily  multivitamin 1 Tablet(s) Oral daily  polyethylene glycol 3350 17 Gram(s) Oral two times a day  senna 2 Tablet(s) Oral at bedtime  tamsulosin 0.4 milliGRAM(s) Oral at bedtime    MEDICATIONS  (PRN):  albuterol    90 MICROgram(s) HFA Inhaler 1 Puff(s) Inhalation three times a day PRN Wheezing  benzocaine/menthol Lozenge 1 Lozenge Oral every 6 hours PRN Sore Throat  calcium carbonate    500 mG (Tums) Chewable 1 Tablet(s) Chew daily PRN Heartburn  dextrose Oral Gel 15 Gram(s) Oral once PRN Blood Glucose LESS THAN 70 milliGRAM(s)/deciliter  magnesium hydroxide Suspension 30 milliLiter(s) Oral daily PRN Constipation  ondansetron Injectable 4 milliGRAM(s) IV Push every 6 hours PRN Nausea and/or Vomiting      Allergies    statins (Hepatotoxicity)    Intolerances        SOCIAL HISTORY:  Denies smoking history, recreational drug use, etoh use    FAMILY HISTORY:      Review of Systems  CONSTITUTIONAL:  Fevers / chills, sweats, fatigue, weight loss, weight gain                                    NEGATIVE  NEURO:  parathesias, seizures, syncope, confusion                                                                               NEGATIVE  EYES:  Blurry vision, discharge, pain, loss of vision                                                                                  NEGATIVE  ENMT:  Difficulty hearing, vertigo, dysphagia, epistaxis, recent dental work                                     NEGATIVE  CV:  Chest pain, palpitations, SAMUEL, orthopnea                                                                                         NEGATIVE  RESPIRATORY:  Wheezing, SOB, cough / sputum, hemoptysis                                                              NEGATIVE  GI:  Nausea, vommiting, diarrhea, constipation, melena                                                                        NEGATIVE  : Hematuria, dysuria, urgency, incontinence                                                                                       NEGATIVE  MUSKULOSKELETAL:  arthritis, joint swelling, muscle weakness                                                           NEGATIVE  SKIN/BREAST:  rash, itching, gerard loss, masses                                                                                            NEGATIVE  PSYCH:  depresion, anxiety, suicidal ideation                                                                                             NEGATIVE  HEME/LYMPH:  bruises easily, enlarged lymph nodes, tender lymph nodes                                        NEGATIVE  ENDOCRINE:  cold intolerance, heat intolerance, polydipsia                                                                   NEGATIVE    PHYSICAL EXAM  Vital Signs Last 24 Hrs  T(C): 36.7 (31 Dec 2024 12:51), Max: 36.9 (30 Dec 2024 17:14)  T(F): 98.1 (31 Dec 2024 12:51), Max: 98.4 (30 Dec 2024 17:14)  HR: 82 (31 Dec 2024 12:51) (78 - 83)  BP: 147/65 (31 Dec 2024 12:51) (112/57 - 147/65)  BP(mean): --  RR: 19 (31 Dec 2024 12:51) (17 - 19)  SpO2: 100% (31 Dec 2024 12:51) (94% - 100%)    Parameters below as of 31 Dec 2024 08:46  Patient On (Oxygen Delivery Method): room air    GEN: NAD, looks comfortable  Psych: Mood appropriate  Neuro: A&Ox3.  No focal deficits.  Moving all extremities.   HEENT: No obvious abnormalities  CV: S1S2, regular, + murmurs appreciated.  No carotid bruits.  No JVD  Lungs: Clear B/L.  No wheezing, rales or rhonchi  ABD: Soft, non-tender, non-distended  EXT: Warm and well perfused.  No peripheral edema noted  Musculoskeletal: Moving all extremities with normal ROM, no joint swelling, s/p L hip replacement                                                             LABS:                        9.0    11.16 )-----------( 481      ( 31 Dec 2024 05:30 )             30.0     12-31    137  |  106  |  20  ----------------------------<  139[H]  4.3   |  18[L]  |  0.60    Ca    8.4      31 Dec 2024 05:30        Urinalysis Basic - ( 31 Dec 2024 05:30 )    Color: x / Appearance: x / SG: x / pH: x  Gluc: 139 mg/dL / Ketone: x  / Bili: x / Urobili: x   Blood: x / Protein: x / Nitrite: x   Leuk Esterase: x / RBC: x / WBC x   Sq Epi: x / Non Sq Epi: x / Bacteria: x              RADIOLOGY & ADDITIONAL STUDIES:  CAROTID U/S: n/a     CXR: < from: Xray Chest 1 View- PORTABLE-Urgent (Xray Chest 1 View- PORTABLE-Urgent .) (12.29.24 @ 11:28) >  IMPRESSION: The cardiac silhouette is enlarged. Small bilateral pleural   effusions with bilateral airspace disease compatible with pulmonary edema.    --- End of Report ---    < end of copied text >      CT Scan: n/a     EKG: afib     TTE / WALTER: < from: TTE Echo Complete w/ Contrast w/ Doppler (12.30.24 @ 16:35) >  CONCLUSIONS:     1. Normal left ventricular size and systolic function.   2. Normal right ventricular size and systolic function.   3. Normal atria.   4. Severe aortic stenosis (paradoxical low-flow, low-gradient). The peak   transvalvular velocity is 3.56 m/s, the mean transvalvular gradient is   24.00 mmHg, and the LVOT/AV velocity ratio is 0.20. The peak transaortic   gradient is 50.69 mmHg. The aortic valve area (estimated via the   continuity method) is 0.52 cm².   5. The calculated left ventricular stroke volume index is 22.00 ml/m²   (normal >35 ml/m2).   6. Pulmonary hypertension present, pulmonary artery systolic pressure is   47 mmHg.   7. No pericardial effusion.   8. No prior echo is available for comparison.    < end of copied text >      Cardiac Cath: n/a

## 2024-12-31 NOTE — SWALLOW BEDSIDE ASSESSMENT ADULT - SWALLOW EVAL: DIAGNOSIS
Although there were no overt signs of airway protection deficits during this exam, complaints, including frequent coughing and voice changes, present post-operatively, are suspicious for pharyngeal dysphagia related to laryngeal dysfunction s/p endotracheal intubation. The patient would benefit from an instrumental swallow study to objectively assess swallow anatomy/physiology and laryngeal function. Given patient’s currently stable respiratory status, the patient appears safe to continue an oral diet prior to instrumental swallow study.

## 2024-12-31 NOTE — SWALLOW BEDSIDE ASSESSMENT ADULT - SLP PERTINENT HISTORY OF CURRENT PROBLEM
PMHx of Afib, ?CVA, DM type 2, HTN, and osteoarthritis who presented with chronic L hip pain x6 mo. Now s/p L YO on 12/17/24

## 2025-01-01 LAB
APPEARANCE UR: ABNORMAL
BILIRUB UR-MCNC: NEGATIVE — SIGNIFICANT CHANGE UP
COLOR SPEC: YELLOW — SIGNIFICANT CHANGE UP
DIFF PNL FLD: ABNORMAL
GLUCOSE UR QL: NEGATIVE MG/DL — SIGNIFICANT CHANGE UP
KETONES UR-MCNC: NEGATIVE MG/DL — SIGNIFICANT CHANGE UP
LEUKOCYTE ESTERASE UR-ACNC: ABNORMAL
NITRITE UR-MCNC: POSITIVE
PH UR: 6 — SIGNIFICANT CHANGE UP (ref 5–8)
PROT UR-MCNC: 30 MG/DL
SP GR SPEC: 1.01 — SIGNIFICANT CHANGE UP (ref 1–1.03)
UROBILINOGEN FLD QL: 0.2 MG/DL — SIGNIFICANT CHANGE UP (ref 0.2–1)

## 2025-01-01 PROCEDURE — 99233 SBSQ HOSP IP/OBS HIGH 50: CPT

## 2025-01-01 RX ORDER — FUROSEMIDE 20 MG
20 TABLET ORAL ONCE
Refills: 0 | Status: COMPLETED | OUTPATIENT
Start: 2025-01-01 | End: 2025-01-01

## 2025-01-01 RX ORDER — GINKGO BILOBA 40 MG
5 CAPSULE ORAL AT BEDTIME
Refills: 0 | Status: DISCONTINUED | OUTPATIENT
Start: 2025-01-01 | End: 2025-01-08

## 2025-01-01 RX ADMIN — FAMOTIDINE 20 MILLIGRAM(S): 20 TABLET, FILM COATED ORAL at 12:00

## 2025-01-01 RX ADMIN — Medication 2: at 21:52

## 2025-01-01 RX ADMIN — Medication 2: at 12:00

## 2025-01-01 RX ADMIN — LISINOPRIL 20 MILLIGRAM(S): 30 TABLET ORAL at 05:58

## 2025-01-01 RX ADMIN — AMIODARONE HYDROCHLORIDE 100 MILLIGRAM(S): 200 TABLET ORAL at 05:59

## 2025-01-01 RX ADMIN — ACETAMINOPHEN 1000 MILLIGRAM(S): 80 SOLUTION/ DROPS ORAL at 05:58

## 2025-01-01 RX ADMIN — Medication 17 GRAM(S): at 17:31

## 2025-01-01 RX ADMIN — Medication 25 MILLIGRAM(S): at 05:59

## 2025-01-01 RX ADMIN — TAMSULOSIN HYDROCHLORIDE 0.4 MILLIGRAM(S): 0.4 CAPSULE ORAL at 21:06

## 2025-01-01 RX ADMIN — IPRATROPIUM BROMIDE AND ALBUTEROL SULFATE 3 MILLILITER(S): .5; 2.5 SOLUTION RESPIRATORY (INHALATION) at 14:23

## 2025-01-01 RX ADMIN — Medication 20 MILLIGRAM(S): at 12:47

## 2025-01-01 RX ADMIN — IPRATROPIUM BROMIDE AND ALBUTEROL SULFATE 3 MILLILITER(S): .5; 2.5 SOLUTION RESPIRATORY (INHALATION) at 05:59

## 2025-01-01 RX ADMIN — SENNOSIDES 2 TABLET(S): 8.6 TABLET, FILM COATED ORAL at 21:06

## 2025-01-01 RX ADMIN — ACETAMINOPHEN 1000 MILLIGRAM(S): 80 SOLUTION/ DROPS ORAL at 21:06

## 2025-01-01 RX ADMIN — ACETAMINOPHEN 1000 MILLIGRAM(S): 80 SOLUTION/ DROPS ORAL at 14:23

## 2025-01-01 RX ADMIN — DABIGATRAN ETEXILATE 150 MILLIGRAM(S): 110 CAPSULE ORAL at 05:58

## 2025-01-01 RX ADMIN — DABIGATRAN ETEXILATE 150 MILLIGRAM(S): 110 CAPSULE ORAL at 17:33

## 2025-01-01 RX ADMIN — Medication 17 GRAM(S): at 05:58

## 2025-01-01 RX ADMIN — Medication 100 MILLIGRAM(S): at 14:24

## 2025-01-01 RX ADMIN — Medication 100 MILLIGRAM(S): at 21:06

## 2025-01-01 RX ADMIN — Medication 5 MILLIGRAM(S): at 05:59

## 2025-01-01 RX ADMIN — IPRATROPIUM BROMIDE AND ALBUTEROL SULFATE 3 MILLILITER(S): .5; 2.5 SOLUTION RESPIRATORY (INHALATION) at 21:05

## 2025-01-01 RX ADMIN — Medication 81 MILLIGRAM(S): at 11:59

## 2025-01-01 RX ADMIN — Medication 100 MILLIGRAM(S): at 05:58

## 2025-01-01 RX ADMIN — Medication 1 TABLET(S): at 12:00

## 2025-01-01 NOTE — SWALLOW FEES ASSESSMENT ADULT - PRELIMINARY ENDOSCOPIC EXAMINATIONS
Radiofrequency ablation was used with a catheter. Right arytenoid appears to move less with left arytenoid prolapsing during phonation. Supraglottic squeeze noted on phonation, at time blocking view of the true vocal folds. SLP to ask ENT to review. No pooling or aspirating of secretions seen.

## 2025-01-01 NOTE — SWALLOW FEES ASSESSMENT ADULT - DIAGNOSTIC IMPRESSIONS
Pt p/w grossly intact swallow skills for his age. Oral phase mostly intact. There is some premature spill of thin liquids prior to the swallow initiation with trace penetration before and during the swallow but not aspiration is seen. Mild post swallow residue in the vallecular and pyriform sinuses, this clears with a liquid wash. SLP will d/w laryngeal anatomy with ENT and need for formal consult.

## 2025-01-01 NOTE — SWALLOW FEES ASSESSMENT ADULT - COMMENTS
Risks, benefits, and alternatives to this study were reviewed with patient. Verbal consent provided. Flexible endoscope passed transnasally to further assess swallow anatomy and physiology. Patient tolerated the passing of the scope and its presence. There is a grainy, yellow material seen in the vallecula that appears to be crushed meds. This remains for the study and is not clear with the PO trials given in this assessment.

## 2025-01-01 NOTE — SWALLOW FEES ASSESSMENT ADULT - PHARYNGEAL PHASE COMMENTS
There is intermittent penetration of thin liquids before the swallow as the bolus tends to spill to the pyriforms and at times reaches the arytenoids. Hyolaryngeal complex and epiglottic inversion suspected to be grossly WNL as white out period is noted and only trace, shallow penetration of liquids is seen. No aspiration is noted across trials. Base of tongue retraction and pharyngeal stripping wave are reduced to a degree and there is mild post swallow residue seen in the pharynx.

## 2025-01-01 NOTE — PROGRESS NOTE ADULT - SUBJECTIVE AND OBJECTIVE BOX
Ortho Note    Pt comfortable without complaints, pain controlled  Denies CP, SOB, N/V, numbness/tingling     Vital Signs Last 24 Hrs  T(C): 36.7 (27 Dec 2024 05:00), Max: 36.8 (26 Dec 2024 10:00)  T(F): 98 (27 Dec 2024 05:00), Max: 98.2 (26 Dec 2024 10:00)  HR: 69 (27 Dec 2024 05:00) (67 - 95)  BP: 134/53 (27 Dec 2024 05:00) (125/60 - 140/78)  BP(mean): --  RR: 18 (27 Dec 2024 05:00) (16 - 19)  SpO2: 96% (27 Dec 2024 05:00) (96% - 100%)    Parameters below as of 27 Dec 2024 05:00  Patient On (Oxygen Delivery Method): room air      General: Pt Alert and oriented, NAD  LLE   DSG C/D/I  Pulses: 2+ DP   Sensation: silt sural/saph/dpn/spn/tib   Motor: Quad/Ham/EHL/FHL/TA/GS 5/5 in strength                    84yM s/p Left YO by Dr. SATISH Echeverria on 12-17  - Weight Bearing Status: Protected Weight Bearing, no active abduction of left hip, no abduction pillow  - Pain control  - DVT PPx: Pradaxa, ASA  - F/u AM labs  -Pt progress:2 person assist   Dispo: out of pocket [facility] vs HPT      Ortho Pager [phone]

## 2025-01-01 NOTE — PROGRESS NOTE ADULT - ASSESSMENT
84/M with CAD, Atrial fibrillation on Pradaxa, CVA (?), DM type 2, HTN and osteoarthritis who presented with chronic left hip pain x 6 months that failed conservative management and admitted for an elective YO by Dr. Echeverria.    #Left hip osteoarthritis   #Post op state  - pain controlled on Tylenol 1g q8h and PRN Tramadol  - Provide adequate analgesia, Incentive spirometry, mobilize with fall precautions, bowel regimen, DVT prophylaxis  - PT/OT    #low flow-low gradient AS  #prior HFmrEF  Satting well on RA, HDS, no wheezing today, bilateral pitting to 1+ LE edema  CXR 12/29: Pulmonary vascular congestion. No pneumothorax. Moderate bilateral pleural effusions. The cardiomediastinal silhouette is suboptimally evaluated. No acute osseous abnormality  ProBNP: 3554  Echo 08/24: Left ventricular systolic function is grossly mildly decreased with an ejection fraction visually estimated at 45 to 50 %. Regional wall motion abnormalities present. Left atrium is moderately dilated. Mild to moderate aortic regurgitation. Moderate aortic stenosis  Stress test 10/24: Normal myocardial perfusion scan, with no evidence of infarction or inducible ischemia  - sp 20mg IV lasix 12/29, PO lasix 40 12/30, given ptn increased volume status on exam today, will give IV lasix 20 once.  Given ptn low-flow low gradient aortic stenosis, will consider diuresis very cautiously and evaluate volume status and need day by day.  Discussed w structural cardiology team.  - repeat echo 12/29: EF 60-65%, low flow low gradient aortic stenosis, valve area 0.52  - Continue metoprolol 25, lisinopril 20  - Follows with cardiologist Dr AVELINO GALICIA, please ensure outpatient follow-up    #persistent dry cough  - negative RVP 12/31, trial tessalon perles for symptoms  - sp SLP eval, now pending FEES, ok to continue diet in meantime    #Constipation  - abdomen Xray no obstruction nor free air  - BM reported by the patient   - mobilize  - bowel regimen with Miralax BID and Senna once at night    #Acute blood loss anemia  - Monitor Hgb; no reported bleeding symptoms   - Transfuse for Hgb < 7  - Ensure Type and Screen available    #Urinary retention  - Lee catheter dced, TOV, on flomax per urology  - ensure adequate bowel regimen in place    #Leucocytosis, stable  - likely reactive  - afebrile and denies symptoms of infection  - monitor temp and WBC trend    #CAD  #Atrial fibrillation  - rate controlled on Metoprolol, Amiodarone 100  - on dabigatran 150 q12    #HTN  - on Metoprolol, Amlodipine and Lisinopril  - ensure BP parameters in place    #DM type 2  - A1C 7, controlled  - on correctional insulin; goal glucose 100-180 mg/dL  - can resume Metformin on discharge  - DM diet    DVT ppx: Dabigatran

## 2025-01-01 NOTE — SWALLOW FEES ASSESSMENT ADULT - ORAL PHASE COMMENTS
Intact oral acceptance and containment without labial spill. Oral bolus control mildly impaired with a small amount of the bolus intermittently spilling to the pyriforms prematurely. This is only seen for liquids. No bolus residue in the oral cavity post swallows.

## 2025-01-01 NOTE — PROGRESS NOTE ADULT - SUBJECTIVE AND OBJECTIVE BOX
Patient is a 84y old  Male who presents with a chief complaint of left hip pain (01 Jan 2025 07:35)        SUBJECTIVE:  Patient was seen and examined at bedside.    Overnight Events :     Last Bowel Movement: 31-Dec-2024 (01-01)  Last Bowel Movement: 31-Dec-2024 (12-31)  Last Bowel Movement: 30-Dec-2024 (12-31)  Last Bowel Movement: 30-Dec-2024 (12-30)  Last Bowel Movement: 28-Dec-2024 (12-30)  Last Bowel Movement: 28-Dec-2024 (12-29)  Last Bowel Movement: 28-Dec-2024 (12-29)  Last Bowel Movement: 28-Dec-2024 (12-28)  Last Bowel Movement: 27-Dec-2024 (12-28)  Last Bowel Movement: 25-Dec-2024 (12-27)  Last Bowel Movement: 25-Dec-2024 (12-27)  Last Bowel Movement: 25-Dec-2024 (12-26)  Last Bowel Movement: 25-Dec-2024 (12-26)  Last Bowel Movement: 23-Dec-2024 (12-25)      Review of systems: 12 point Review of systems negative unless otherwise documented elsewhere in note.     Diet, Consistent Carbohydrate/No Snacks:   Supplement Feeding Modality:  Oral  Ensure Max Cans or Servings Per Day:  1       Frequency:  Three Times a day (12-20-24 @ 13:26) [Active]      MEDICATIONS:  MEDICATIONS  (STANDING):  acetaminophen     Tablet .. 1000 milliGRAM(s) Oral every 8 hours  albuterol    90 MICROgram(s) HFA Inhaler 1 Puff(s) Inhalation once  albuterol/ipratropium for Nebulization 3 milliLiter(s) Nebulizer every 8 hours  aMIOdarone    Tablet 100 milliGRAM(s) Oral daily  amLODIPine   Tablet 5 milliGRAM(s) Oral daily  aspirin enteric coated 81 milliGRAM(s) Oral daily  benzonatate 100 milliGRAM(s) Oral three times a day  dabigatran 150 milliGRAM(s) Oral every 12 hours  dextrose 5%. 1000 milliLiter(s) (50 mL/Hr) IV Continuous <Continuous>  dextrose 5%. 1000 milliLiter(s) (100 mL/Hr) IV Continuous <Continuous>  dextrose 50% Injectable 25 Gram(s) IV Push once  dextrose 50% Injectable 12.5 Gram(s) IV Push once  dextrose 50% Injectable 25 Gram(s) IV Push once  famotidine    Tablet 20 milliGRAM(s) Oral daily  furosemide    Tablet 40 milliGRAM(s) Oral once  glucagon  Injectable 1 milliGRAM(s) IntraMuscular once  HYDROmorphone  Injectable 0.5 milliGRAM(s) IV Push once  insulin lispro (ADMELOG) corrective regimen sliding scale   SubCutaneous Before meals and at bedtime  lisinopril 20 milliGRAM(s) Oral daily  metoprolol succinate ER 25 milliGRAM(s) Oral daily  multivitamin 1 Tablet(s) Oral daily  polyethylene glycol 3350 17 Gram(s) Oral two times a day  senna 2 Tablet(s) Oral at bedtime  tamsulosin 0.4 milliGRAM(s) Oral at bedtime    MEDICATIONS  (PRN):  albuterol    90 MICROgram(s) HFA Inhaler 1 Puff(s) Inhalation three times a day PRN Wheezing  benzocaine/menthol Lozenge 1 Lozenge Oral every 6 hours PRN Sore Throat  calcium carbonate    500 mG (Tums) Chewable 1 Tablet(s) Chew daily PRN Heartburn  dextrose Oral Gel 15 Gram(s) Oral once PRN Blood Glucose LESS THAN 70 milliGRAM(s)/deciliter  magnesium hydroxide Suspension 30 milliLiter(s) Oral daily PRN Constipation  ondansetron Injectable 4 milliGRAM(s) IV Push every 6 hours PRN Nausea and/or Vomiting      Allergies    statins (Hepatotoxicity)    Intolerances        OBJECTIVE:  Vital Signs Last 24 Hrs  T(C): 36.7 (01 Jan 2025 11:57), Max: 36.8 (31 Dec 2024 17:10)  T(F): 98 (01 Jan 2025 11:57), Max: 98.3 (31 Dec 2024 17:10)  HR: 88 (01 Jan 2025 11:57) (81 - 88)  BP: 114/69 (01 Jan 2025 11:57) (111/63 - 126/59)  BP(mean): --  RR: 18 (01 Jan 2025 11:57) (18 - 20)  SpO2: 98% (01 Jan 2025 11:57) (95% - 100%)    Parameters below as of 01 Jan 2025 11:57  Patient On (Oxygen Delivery Method): room air      I&O's Summary    01 Jan 2025 07:01  -  01 Jan 2025 14:31  --------------------------------------------------------  IN: 520 mL / OUT: 100 mL / NET: 420 mL        PHYSICAL EXAM:  Gen: Resting in bed at time of exam, not in distress   CV: +S1/S2, 3+ blowing systolic murmur  Pulm: some rhonchi on exam today at bases, no crackles, no WOB  Abd: soft, NTND  Skin: warm and dry, no new rashes   Ext: moving all 4 extremities spontaneously, edema increased bilaterally today to 1+ pitting  Neuro: AOx3, no gross focal neurological deficits  Psych: affect and behavior appropriate, pleasant at time of interview    LABS:                        9.2    10.82 )-----------( 571      ( 01 Jan 2025 12:41 )             31.1     01-01    138  |  107  |  20  ----------------------------<  155[H]  4.1   |  19[L]  |  0.60    Ca    8.0[L]      01 Jan 2025 12:41          CAPILLARY BLOOD GLUCOSE      POCT Blood Glucose.: 159 mg/dL (01 Jan 2025 11:59)  POCT Blood Glucose.: 132 mg/dL (01 Jan 2025 07:20)  POCT Blood Glucose.: 170 mg/dL (31 Dec 2024 21:47)  POCT Blood Glucose.: 136 mg/dL (31 Dec 2024 17:18)    Urinalysis Basic - ( 01 Jan 2025 12:41 )    Color: x / Appearance: x / SG: x / pH: x  Gluc: 155 mg/dL / Ketone: x  / Bili: x / Urobili: x   Blood: x / Protein: x / Nitrite: x   Leuk Esterase: x / RBC: x / WBC x   Sq Epi: x / Non Sq Epi: x / Bacteria: x        MICRODATA:      RADIOLOGY/OTHER STUDIES:

## 2025-01-01 NOTE — SWALLOW FEES ASSESSMENT ADULT - SLP PERTINENT HISTORY OF CURRENT PROBLEM
84/M with CAD, Atrial fibrillation on Pradaxa, CVA (?), DM type 2, HTN and osteoarthritis who presented with chronic left hip pain x 6 months that failed conservative management and admitted for an elective YO by Dr. Echeverria. Pt has had dysphonia and coughing when eating since extubation. SLP recommends this test. MD order obtained.

## 2025-01-02 LAB
ANION GAP SERPL CALC-SCNC: 12 MMOL/L — SIGNIFICANT CHANGE UP (ref 5–17)
BUN SERPL-MCNC: 20 MG/DL — SIGNIFICANT CHANGE UP (ref 7–23)
CALCIUM SERPL-MCNC: 8.2 MG/DL — LOW (ref 8.4–10.5)
CHLORIDE SERPL-SCNC: 106 MMOL/L — SIGNIFICANT CHANGE UP (ref 96–108)
CO2 SERPL-SCNC: 19 MMOL/L — LOW (ref 22–31)
CREAT SERPL-MCNC: 0.64 MG/DL — SIGNIFICANT CHANGE UP (ref 0.5–1.3)
EGFR: 92 ML/MIN/1.73M2 — SIGNIFICANT CHANGE UP
GLUCOSE SERPL-MCNC: 121 MG/DL — HIGH (ref 70–99)
HCT VFR BLD CALC: 27.5 % — LOW (ref 39–50)
HGB BLD-MCNC: 8.4 G/DL — LOW (ref 13–17)
MAGNESIUM SERPL-MCNC: 1.8 MG/DL — SIGNIFICANT CHANGE UP (ref 1.6–2.6)
MCHC RBC-ENTMCNC: 28.3 PG — SIGNIFICANT CHANGE UP (ref 27–34)
MCHC RBC-ENTMCNC: 30.5 G/DL — LOW (ref 32–36)
MCV RBC AUTO: 92.6 FL — SIGNIFICANT CHANGE UP (ref 80–100)
NRBC # BLD: 0 /100 WBCS — SIGNIFICANT CHANGE UP (ref 0–0)
PLATELET # BLD AUTO: 525 K/UL — HIGH (ref 150–400)
POTASSIUM SERPL-MCNC: 4 MMOL/L — SIGNIFICANT CHANGE UP (ref 3.5–5.3)
POTASSIUM SERPL-SCNC: 4 MMOL/L — SIGNIFICANT CHANGE UP (ref 3.5–5.3)
RBC # BLD: 2.97 M/UL — LOW (ref 4.2–5.8)
RBC # FLD: 16.4 % — HIGH (ref 10.3–14.5)
SODIUM SERPL-SCNC: 137 MMOL/L — SIGNIFICANT CHANGE UP (ref 135–145)
WBC # BLD: 12.21 K/UL — HIGH (ref 3.8–10.5)
WBC # FLD AUTO: 12.21 K/UL — HIGH (ref 3.8–10.5)

## 2025-01-02 PROCEDURE — 99233 SBSQ HOSP IP/OBS HIGH 50: CPT

## 2025-01-02 RX ORDER — CEFTRIAXONE SODIUM 1 G/1
1000 INJECTION, POWDER, FOR SOLUTION INTRAMUSCULAR; INTRAVENOUS EVERY 24 HOURS
Refills: 0 | Status: COMPLETED | OUTPATIENT
Start: 2025-01-02 | End: 2025-01-04

## 2025-01-02 RX ADMIN — Medication 25 MILLIGRAM(S): at 06:07

## 2025-01-02 RX ADMIN — IPRATROPIUM BROMIDE AND ALBUTEROL SULFATE 3 MILLILITER(S): .5; 2.5 SOLUTION RESPIRATORY (INHALATION) at 22:06

## 2025-01-02 RX ADMIN — FAMOTIDINE 20 MILLIGRAM(S): 20 TABLET, FILM COATED ORAL at 11:31

## 2025-01-02 RX ADMIN — LISINOPRIL 20 MILLIGRAM(S): 30 TABLET ORAL at 06:07

## 2025-01-02 RX ADMIN — ACETAMINOPHEN 1000 MILLIGRAM(S): 80 SOLUTION/ DROPS ORAL at 22:05

## 2025-01-02 RX ADMIN — CEFTRIAXONE SODIUM 100 MILLIGRAM(S): 1 INJECTION, POWDER, FOR SOLUTION INTRAMUSCULAR; INTRAVENOUS at 07:23

## 2025-01-02 RX ADMIN — AMIODARONE HYDROCHLORIDE 100 MILLIGRAM(S): 200 TABLET ORAL at 06:07

## 2025-01-02 RX ADMIN — Medication 100 MILLIGRAM(S): at 13:11

## 2025-01-02 RX ADMIN — Medication 100 MILLIGRAM(S): at 06:07

## 2025-01-02 RX ADMIN — IPRATROPIUM BROMIDE AND ALBUTEROL SULFATE 3 MILLILITER(S): .5; 2.5 SOLUTION RESPIRATORY (INHALATION) at 13:11

## 2025-01-02 RX ADMIN — ACETAMINOPHEN 1000 MILLIGRAM(S): 80 SOLUTION/ DROPS ORAL at 06:07

## 2025-01-02 RX ADMIN — ACETAMINOPHEN 1000 MILLIGRAM(S): 80 SOLUTION/ DROPS ORAL at 13:11

## 2025-01-02 RX ADMIN — Medication 5 MILLIGRAM(S): at 22:06

## 2025-01-02 RX ADMIN — DABIGATRAN ETEXILATE 150 MILLIGRAM(S): 110 CAPSULE ORAL at 06:07

## 2025-01-02 RX ADMIN — Medication 2: at 22:22

## 2025-01-02 RX ADMIN — Medication 81 MILLIGRAM(S): at 11:31

## 2025-01-02 RX ADMIN — IPRATROPIUM BROMIDE AND ALBUTEROL SULFATE 3 MILLILITER(S): .5; 2.5 SOLUTION RESPIRATORY (INHALATION) at 05:40

## 2025-01-02 RX ADMIN — SENNOSIDES 2 TABLET(S): 8.6 TABLET, FILM COATED ORAL at 22:06

## 2025-01-02 RX ADMIN — DABIGATRAN ETEXILATE 150 MILLIGRAM(S): 110 CAPSULE ORAL at 17:40

## 2025-01-02 RX ADMIN — Medication 17 GRAM(S): at 06:06

## 2025-01-02 RX ADMIN — Medication 5 MILLIGRAM(S): at 06:07

## 2025-01-02 RX ADMIN — Medication 1 TABLET(S): at 11:31

## 2025-01-02 RX ADMIN — TAMSULOSIN HYDROCHLORIDE 0.4 MILLIGRAM(S): 0.4 CAPSULE ORAL at 22:06

## 2025-01-02 RX ADMIN — Medication 100 MILLIGRAM(S): at 22:12

## 2025-01-02 RX ADMIN — Medication 17 GRAM(S): at 17:40

## 2025-01-02 NOTE — PROGRESS NOTE ADULT - SUBJECTIVE AND OBJECTIVE BOX
Ortho Note    Patient seen and examined at bedside on AM rounds. Pt comfortable without complaints, pain controlled. Reports discomfort to the b/l heels. Reports continued cough.  Denies CP, SOB, N/V, new numbness/tingling      Vital Signs Last 24 Hrs  T(C): 36.7 (01-02-25 @ 08:29), Max: 36.8 (01-02-25 @ 06:00)  T(F): 98 (01-02-25 @ 08:29), Max: 98.2 (01-02-25 @ 06:00)  HR: 86 (01-02-25 @ 08:29) (86 - 87)  BP: 117/65 (01-02-25 @ 08:29) (117/56 - 117/65)  BP(mean): --  RR: 18 (01-02-25 @ 08:29) (18 - 18)  SpO2: 95% (01-02-25 @ 08:29) (95% - 97%)  I&O's Summary    01 Jan 2025 07:01  -  02 Jan 2025 07:00  --------------------------------------------------------  IN: 520 mL / OUT: 670 mL / NET: -150 mL    02 Jan 2025 07:01  -  02 Jan 2025 09:30  --------------------------------------------------------  IN: 420 mL / OUT: 0 mL / NET: 420 mL        General: Pt Alert and oriented, NAD  DSG to left hip C/D/I with a small proximal area of sanguinous staining  Abdomen: soft, NTND  Pulses: 2+ DP pulses palpable bilaterally, cap refill brisk, no calf tenderness bilaterally  Skin: Skin to b/l heels intact   Sensation: intact to light touch throughout  Motor:  EHL/FHL/TA/GS 5/5 bilaterally                          8.4    12.21 )-----------( 525      ( 02 Jan 2025 05:30 )             27.5     01-02    137  |  106  |  20  ----------------------------<  121[H]  4.0   |  19[L]  |  0.64    Ca    8.2[L]      02 Jan 2025 05:30  Mg     1.8     01-02        A/P: 85 y/o Male POD#16 s/p left total hip replacement    - Vitals reviewed, stable  - AM labs reviewed, stable  - Ceftriaxone for UTI   - Appreciate medicine recommendations  - B/l heels offloaded with a pillow  - Pain Control  - OOB/IS  - Bowel Regimen  - F/u FEES  - DVT ppx: home anticoagulation (Dabigatran, ASA); SCDs b/l  - PT, WBS: hip precautions, no active abduction of the left hip, no abduction pillow  - Dispo: [facility] (paid for out of pocket) versus home PT Ortho Note    Patient seen and examined at bedside on AM rounds. Pt comfortable without complaints, pain controlled. Reports discomfort to the b/l heels. Reports continued cough.  Denies CP, SOB, N/V, new numbness/tingling      Vital Signs Last 24 Hrs  T(C): 36.7 (01-02-25 @ 08:29), Max: 36.8 (01-02-25 @ 06:00)  T(F): 98 (01-02-25 @ 08:29), Max: 98.2 (01-02-25 @ 06:00)  HR: 86 (01-02-25 @ 08:29) (86 - 87)  BP: 117/65 (01-02-25 @ 08:29) (117/56 - 117/65)  BP(mean): --  RR: 18 (01-02-25 @ 08:29) (18 - 18)  SpO2: 95% (01-02-25 @ 08:29) (95% - 97%)  I&O's Summary    01 Jan 2025 07:01  -  02 Jan 2025 07:00  --------------------------------------------------------  IN: 520 mL / OUT: 670 mL / NET: -150 mL    02 Jan 2025 07:01  -  02 Jan 2025 09:30  --------------------------------------------------------  IN: 420 mL / OUT: 0 mL / NET: 420 mL        General: Pt Alert and oriented, NAD  DSG to left hip (Aquacel) C/D/I with a small proximal area of sanguinous staining  Abdomen: soft, NTND  Pulses: 2+ DP pulses palpable bilaterally, cap refill brisk, no calf tenderness bilaterally. 1+ pitting edema to the b/l LE  Skin: Skin to b/l heels intact   Sensation: intact to light touch throughout  Motor:  EHL/FHL/TA/GS 5/5 bilaterally                          8.4    12.21 )-----------( 525      ( 02 Jan 2025 05:30 )             27.5     01-02    137  |  106  |  20  ----------------------------<  121[H]  4.0   |  19[L]  |  0.64    Ca    8.2[L]      02 Jan 2025 05:30  Mg     1.8     01-02        A/P: 83 y/o Male POD#16 s/p left total hip replacement    - Vitals reviewed, stable  - AM labs reviewed, stable  - Ceftriaxone for UTI   - Appreciate medicine recommendations  - B/l heels offloaded with a pillow  - Pain Control  - OOB/IS  - Bowel Regimen  - F/u FEES  - DVT ppx: home anticoagulation (Dabigatran, ASA); SCDs b/l  - PT, WBS: hip precautions, no active abduction of the left hip, no abduction pillow  - Dispo: [facility] (paid for out of pocket) versus home PT Hpi Title: Evaluation of a Skin Lesion

## 2025-01-02 NOTE — CHART NOTE - NSCHARTNOTEFT_GEN_A_CORE
Admitting Diagnosis:   Patient is a 84y old  Male who presents with a chief complaint of left hip pain (02 Jan 2025 12:10)      PAST MEDICAL & SURGICAL HISTORY:  Renal artery stenosis      Afib      HTN (hypertension)      Hepatitis      DM (diabetes mellitus)      Intermittent claudication      CVA (cerebral vascular accident)  2014      Osteoarthritis      History of tonsillectomy      History of femoral angiogram  with stent placement      H/O eye surgery  b/l cataracts      Current Nutrition Order:   Diet, Consistent Carbohydrate/No Snacks:   Supplement Feeding Modality:  Oral  Ensure Max Cans or Servings Per Day:  1       Frequency:  Three Times a day (12-20-24 @ 13:26) [Active]    PO Intake: Good (%) [ x  ]  Fair (50-75%) [   ] Poor (<25%) [   ]    GI Issues:  WNL as per flowsheet, denies nausea/ vomiting/ constipation/ diarrhea, last bowel movement documented 12/31    Pain: 0/10 as per flowsheet    Skin Integrity: surgical incisions; stage I pressure injury of sacrum documented, dario 16; no edema documented.         01-01-25 @ 07:01  -  01-02-25 @ 07:00  --------------------------------------------------------  IN: 520 mL / OUT: 670 mL / NET: -150 mL    01-02-25 @ 07:01 - 01-02-25 @ 16:27  --------------------------------------------------------  IN: 540 mL / OUT: 200 mL / NET: 340 mL        Labs:   01-02    137  |  106  |  20  ----------------------------<  121[H]  4.0   |  19[L]  |  0.64    Ca    8.2[L]      02 Jan 2025 05:30  Mg     1.8     01-02      CAPILLARY BLOOD GLUCOSE  POCT Blood Glucose.: 143 mg/dL (02 Jan 2025 11:51)  POCT Blood Glucose.: 131 mg/dL (02 Jan 2025 07:12)  POCT Blood Glucose.: 151 mg/dL (01 Jan 2025 21:42)  POCT Blood Glucose.: 139 mg/dL (01 Jan 2025 17:32)      Medications:  MEDICATIONS  (STANDING):  acetaminophen     Tablet .. 1000 milliGRAM(s) Oral every 8 hours  albuterol    90 MICROgram(s) HFA Inhaler 1 Puff(s) Inhalation once  albuterol/ipratropium for Nebulization 3 milliLiter(s) Nebulizer every 8 hours  aMIOdarone    Tablet 100 milliGRAM(s) Oral daily  amLODIPine   Tablet 5 milliGRAM(s) Oral daily  aspirin enteric coated 81 milliGRAM(s) Oral daily  benzonatate 100 milliGRAM(s) Oral three times a day  cefTRIAXone   IVPB 1000 milliGRAM(s) IV Intermittent every 24 hours  dabigatran 150 milliGRAM(s) Oral every 12 hours  dextrose 5%. 1000 milliLiter(s) (100 mL/Hr) IV Continuous <Continuous>  dextrose 5%. 1000 milliLiter(s) (50 mL/Hr) IV Continuous <Continuous>  dextrose 50% Injectable 25 Gram(s) IV Push once  dextrose 50% Injectable 12.5 Gram(s) IV Push once  dextrose 50% Injectable 25 Gram(s) IV Push once  famotidine    Tablet 20 milliGRAM(s) Oral daily  furosemide    Tablet 40 milliGRAM(s) Oral once  glucagon  Injectable 1 milliGRAM(s) IntraMuscular once  HYDROmorphone  Injectable 0.5 milliGRAM(s) IV Push once  insulin lispro (ADMELOG) corrective regimen sliding scale   SubCutaneous Before meals and at bedtime  lisinopril 20 milliGRAM(s) Oral daily  metoprolol succinate ER 25 milliGRAM(s) Oral daily  multivitamin 1 Tablet(s) Oral daily  polyethylene glycol 3350 17 Gram(s) Oral two times a day  senna 2 Tablet(s) Oral at bedtime  tamsulosin 0.4 milliGRAM(s) Oral at bedtime    MEDICATIONS  (PRN):  albuterol    90 MICROgram(s) HFA Inhaler 1 Puff(s) Inhalation three times a day PRN Wheezing  benzocaine/menthol Lozenge 1 Lozenge Oral every 6 hours PRN Sore Throat  calcium carbonate    500 mG (Tums) Chewable 1 Tablet(s) Chew daily PRN Heartburn  dextrose Oral Gel 15 Gram(s) Oral once PRN Blood Glucose LESS THAN 70 milliGRAM(s)/deciliter  magnesium hydroxide Suspension 30 milliLiter(s) Oral daily PRN Constipation  melatonin 5 milliGRAM(s) Oral at bedtime PRN Sleep  ondansetron Injectable 4 milliGRAM(s) IV Push every 6 hours PRN Nausea and/or Vomiting      Height for BMI (FEET)	5 Feet  Height for BMI (INCHES)	8 Inch(s)  Height for BMI (CENTIMETERS)	172.72 Centimeter(s)  Weight for BMI (lbs)	141 lb  Weight for BMI (kg)	64 kg  Body Mass Index	21.4  Ideal body weight 154 lb / 70 kg (92%)    Weight Change: No new wt obtained since 12/17, recommend weekly wt to track / trend wt changes.     Estimated energy needs:   Estimated Energy Needs Weight (lbs)	141 lb  Estimated Energy Needs Weight (kg)	63.9 kg  Estimated Energy Needs From (duyen/kg)	25  Estimated Energy Needs To (duyen/kg)	30  Estimated Energy Needs Calculated From (duyen/kg)	1597  Estimated Energy Needs Calculated To (duyen/kg)	1917    Estimated Protein Needs Weight (lbs)	141 lb  Estimated Protein Needs Weight (kg)	63.9 kg  Estimated Protein Needs From (g/kg)	1  Estimated Protein Needs To (g/kg)	1.3  Estimated Protein Needs Calculated From (g/kg)	63.9  Estimated Protein Needs Calculated To (g/kg)	83.07    Estimated Fluid Needs Weight (lbs)	141 lb  Estimated Fluid Needs Weight (kg)	63.9 kg  Estimated Fluid Needs From (ml/kg)	30  Estimated Fluid Needs To (ml/kg)	35  Estimated Fluid Needs Calculated From (ml/kg)	1917  Estimated Fluid Needs Calculated To (ml/kg)	2236    Other Calculations:	Based on dosing wt 141 pounds as pt within % ideal body weight (92%). Needs adjusted for post-op healing, advanced age.    Subjective: "84yoM with left hip pain x 6 months without inciting accident or injury. Patient endorses pain with movement. Denies prior surgeries to the hip or injection. He takes Tylenol at home for pain with some relief of symptoms. Pain persists despite conservative measures. Ambulates via wheelchair, occasionally uses a walker."     Patient seen at bedside on 9UR for nutrition follow up, however, pt resting prior to PT and obtained information from HHA at bedside. Current diet order: consistent carb. HHA endorses pt with great appetite, continues to consume 3 meals/day alongiwth Ensure Max 3x/day - continuing to likely meet >/= 75% estimated nutrient needs. Was seen by SLP yesterday (1/1) and had FEES test performed, SLP recommended to continue with regular texture diet and thin liquids. HHA denies pt with any complaints of nausea/vomiting/diarrhea/constipation, last bowel movement documented 12/31. Labs: POCTs (143, 131, 151, 139 mg/dL...), serum glucose 121 mg/dL - within goal of 100-1080 mg/dL; CO2 low. Meds reviewed as above. RD will continue to follow, see nutrition recommendations below.     Previous Nutrition Diagnosis: Increased nutrient needs... protein related to physiological demands as evidenced by status post total hip replacement.     Active [x   ]  Resolved [   ]    Goal: Pt will consume >/= 75% of estimated nutrient needs     Recommendations:  1. Continue current diet order + Ensure Max 3x/day to optimize nutritional needs (provides 150 kcal, 30 g protein/ shake)   2. Encourage and monitor PO intake, honor preferences as able   >> Consistently meet >75% of estimated needs during admission   3. Monitor wt trends, GI function, skin integrity  4. Monitor lytes, renal indices, blood glucose, LFTs    5. Pain and bowel regimen per team  6. Continue with MVI daily to meet 100% RDA needs   RD will continue to monitor PO intake, labs, hydration, and wt prn.    Education: N/A as pt sleeping upon RD visit this morning, will provide nutrition education PRN upon follow up.     Risk Level: High [   ] Moderate [ x  ] Low [   ] Admitting Diagnosis:   Patient is a 84y old  Male who presents with a chief complaint of left hip pain (02 Jan 2025 12:10)      PAST MEDICAL & SURGICAL HISTORY:  Renal artery stenosis      Afib      HTN (hypertension)      Hepatitis      DM (diabetes mellitus)      Intermittent claudication      CVA (cerebral vascular accident)  2014      Osteoarthritis      History of tonsillectomy      History of femoral angiogram  with stent placement      H/O eye surgery  b/l cataracts      Current Nutrition Order:   Diet, Consistent Carbohydrate/No Snacks:   Supplement Feeding Modality:  Oral  Ensure Max Cans or Servings Per Day:  1       Frequency:  Three Times a day (12-20-24 @ 13:26) [Active]    PO Intake: Good (%) [ x  ]  Fair (50-75%) [   ] Poor (<25%) [   ]    GI Issues:  WNL as per flowsheet, denies nausea/ vomiting/ constipation/ diarrhea, last bowel movement documented 12/31    Pain: 0/10 as per flowsheet    Skin Integrity: surgical incisions; stage I pressure injury of sacrum documented, dario 16; no edema documented.         01-01-25 @ 07:01  -  01-02-25 @ 07:00  --------------------------------------------------------  IN: 520 mL / OUT: 670 mL / NET: -150 mL    01-02-25 @ 07:01 - 01-02-25 @ 16:27  --------------------------------------------------------  IN: 540 mL / OUT: 200 mL / NET: 340 mL        Labs:   01-02    137  |  106  |  20  ----------------------------<  121[H]  4.0   |  19[L]  |  0.64    Ca    8.2[L]      02 Jan 2025 05:30  Mg     1.8     01-02      CAPILLARY BLOOD GLUCOSE  POCT Blood Glucose.: 143 mg/dL (02 Jan 2025 11:51)  POCT Blood Glucose.: 131 mg/dL (02 Jan 2025 07:12)  POCT Blood Glucose.: 151 mg/dL (01 Jan 2025 21:42)  POCT Blood Glucose.: 139 mg/dL (01 Jan 2025 17:32)      Medications:  MEDICATIONS  (STANDING):  acetaminophen     Tablet .. 1000 milliGRAM(s) Oral every 8 hours  albuterol    90 MICROgram(s) HFA Inhaler 1 Puff(s) Inhalation once  albuterol/ipratropium for Nebulization 3 milliLiter(s) Nebulizer every 8 hours  aMIOdarone    Tablet 100 milliGRAM(s) Oral daily  amLODIPine   Tablet 5 milliGRAM(s) Oral daily  aspirin enteric coated 81 milliGRAM(s) Oral daily  benzonatate 100 milliGRAM(s) Oral three times a day  cefTRIAXone   IVPB 1000 milliGRAM(s) IV Intermittent every 24 hours  dabigatran 150 milliGRAM(s) Oral every 12 hours  dextrose 5%. 1000 milliLiter(s) (100 mL/Hr) IV Continuous <Continuous>  dextrose 5%. 1000 milliLiter(s) (50 mL/Hr) IV Continuous <Continuous>  dextrose 50% Injectable 25 Gram(s) IV Push once  dextrose 50% Injectable 12.5 Gram(s) IV Push once  dextrose 50% Injectable 25 Gram(s) IV Push once  famotidine    Tablet 20 milliGRAM(s) Oral daily  furosemide    Tablet 40 milliGRAM(s) Oral once  glucagon  Injectable 1 milliGRAM(s) IntraMuscular once  HYDROmorphone  Injectable 0.5 milliGRAM(s) IV Push once  insulin lispro (ADMELOG) corrective regimen sliding scale   SubCutaneous Before meals and at bedtime  lisinopril 20 milliGRAM(s) Oral daily  metoprolol succinate ER 25 milliGRAM(s) Oral daily  multivitamin 1 Tablet(s) Oral daily  polyethylene glycol 3350 17 Gram(s) Oral two times a day  senna 2 Tablet(s) Oral at bedtime  tamsulosin 0.4 milliGRAM(s) Oral at bedtime    MEDICATIONS  (PRN):  albuterol    90 MICROgram(s) HFA Inhaler 1 Puff(s) Inhalation three times a day PRN Wheezing  benzocaine/menthol Lozenge 1 Lozenge Oral every 6 hours PRN Sore Throat  calcium carbonate    500 mG (Tums) Chewable 1 Tablet(s) Chew daily PRN Heartburn  dextrose Oral Gel 15 Gram(s) Oral once PRN Blood Glucose LESS THAN 70 milliGRAM(s)/deciliter  magnesium hydroxide Suspension 30 milliLiter(s) Oral daily PRN Constipation  melatonin 5 milliGRAM(s) Oral at bedtime PRN Sleep  ondansetron Injectable 4 milliGRAM(s) IV Push every 6 hours PRN Nausea and/or Vomiting      Height for BMI (FEET)	5 Feet  Height for BMI (INCHES)	8 Inch(s)  Height for BMI (CENTIMETERS)	172.72 Centimeter(s)  Weight for BMI (lbs)	141 lb  Weight for BMI (kg)	64 kg  Body Mass Index	21.4  Ideal body weight 154 lb / 70 kg (92%)    Weight Change: No new wt obtained since 12/17, recommend weekly wt to track / trend wt changes.     Estimated energy needs:   Estimated Energy Needs Weight (lbs)	141 lb  Estimated Energy Needs Weight (kg)	63.9 kg  Estimated Energy Needs From (duyen/kg)	25  Estimated Energy Needs To (duyen/kg)	30  Estimated Energy Needs Calculated From (duyen/kg)	1597  Estimated Energy Needs Calculated To (duyen/kg)	1917    Estimated Protein Needs Weight (lbs)	141 lb  Estimated Protein Needs Weight (kg)	63.9 kg  Estimated Protein Needs From (g/kg)	1  Estimated Protein Needs To (g/kg)	1.3  Estimated Protein Needs Calculated From (g/kg)	63.9  Estimated Protein Needs Calculated To (g/kg)	83.07    Estimated Fluid Needs Weight (lbs)	141 lb  Estimated Fluid Needs Weight (kg)	63.9 kg  Estimated Fluid Needs From (ml/kg)	30  Estimated Fluid Needs To (ml/kg)	35  Estimated Fluid Needs Calculated From (ml/kg)	1917  Estimated Fluid Needs Calculated To (ml/kg)	2236    Other Calculations:	Based on dosing wt 141 pounds as pt within % ideal body weight (92%). Needs adjusted for post-op healing, advanced age.    Subjective: "84yoM with left hip pain x 6 months without inciting accident or injury. Patient endorses pain with movement. Denies prior surgeries to the hip or injection. He takes Tylenol at home for pain with some relief of symptoms. Pain persists despite conservative measures. Ambulates via wheelchair, occasionally uses a walker."     Patient seen at bedside on 9WO for nutrition follow up, however, pt resting prior to PT and obtained information from HHA at bedside. Current diet order: consistent carb. HHA endorses pt with great appetite, continues to consume 3 meals/day alongiwth Ensure Max 3x/day - continuing to likely meet >/= 75% estimated nutrient needs. Was seen by SLP yesterday (1/1) and had FEES test performed, SLP recommended to continue with regular texture diet and thin liquids. HHA denies pt with any complaints of nausea/vomiting/diarrhea/constipation, last bowel movement documented 12/31. Labs: POCTs (143, 131, 151, 139 mg/dL...), serum glucose 121 mg/dL - within goal of 100-1080 mg/dL; CO2 low. Meds reviewed as above. RD will continue to follow, see nutrition recommendations below.     Previous Nutrition Diagnosis: Increased nutrient needs... protein related to physiological demands as evidenced by status post total hip replacement.     Active [x   ]  Resolved [   ]    Goal: Pt will consume >/= 75% of estimated nutrient needs     Recommendations:  1. Continue current diet order + Ensure Max 3x/day to optimize nutritional needs (provides 150 kcal, 30 g protein/ shake)   2. Encourage and monitor PO intake, honor preferences as able   >> Consistently meet >75% of estimated needs during admission   3. Monitor wt trends, GI function, skin integrity  4. Monitor lytes, renal indices, blood glucose, LFTs    5. Pain and bowel regimen per team  6. Continue with MVI daily to meet 100% RDA needs   RD will continue to monitor PO intake, labs, hydration, and wt prn.    Education: N/A as pt sleeping upon RD visit this morning, will provide nutrition education PRN upon follow up.     Risk Level: High [   ] Moderate [ x  ] Low [   ]

## 2025-01-02 NOTE — PROGRESS NOTE ADULT - SUBJECTIVE AND OBJECTIVE BOX
Patient is a 84y old  Male who presents with a chief complaint of left hip pain (02 Jan 2025 09:30)        SUBJECTIVE:  Patient was seen and examined at bedside, denies SOB, CP, WOB.  Friend bedside, discussed plan w ptn, all questions answered.    Overnight Events : ptn complaint of dysuria, UA sent w WBCs, ctx started for UTI    Last Bowel Movement: 31-Dec-2024 (01-02)  Last Bowel Movement: 31-Jan-2025 (01-01)  Last Bowel Movement: 31-Dec-2024 (01-01)  Last Bowel Movement: 31-Dec-2024 (12-31)  Last Bowel Movement: 30-Dec-2024 (12-31)  Last Bowel Movement: 30-Dec-2024 (12-30)  Last Bowel Movement: 28-Dec-2024 (12-30)  Last Bowel Movement: 28-Dec-2024 (12-29)  Last Bowel Movement: 28-Dec-2024 (12-29)  Last Bowel Movement: 28-Dec-2024 (12-28)  Last Bowel Movement: 27-Dec-2024 (12-28)  Last Bowel Movement: 25-Dec-2024 (12-27)  Last Bowel Movement: 25-Dec-2024 (12-27)  Last Bowel Movement: 25-Dec-2024 (12-26)      Review of systems: 12 point Review of systems negative unless otherwise documented elsewhere in note.     Diet, Consistent Carbohydrate/No Snacks:   Supplement Feeding Modality:  Oral  Ensure Max Cans or Servings Per Day:  1       Frequency:  Three Times a day (12-20-24 @ 13:26) [Active]      MEDICATIONS:  MEDICATIONS  (STANDING):  acetaminophen     Tablet .. 1000 milliGRAM(s) Oral every 8 hours  albuterol    90 MICROgram(s) HFA Inhaler 1 Puff(s) Inhalation once  albuterol/ipratropium for Nebulization 3 milliLiter(s) Nebulizer every 8 hours  aMIOdarone    Tablet 100 milliGRAM(s) Oral daily  amLODIPine   Tablet 5 milliGRAM(s) Oral daily  aspirin enteric coated 81 milliGRAM(s) Oral daily  benzonatate 100 milliGRAM(s) Oral three times a day  cefTRIAXone   IVPB 1000 milliGRAM(s) IV Intermittent every 24 hours  dabigatran 150 milliGRAM(s) Oral every 12 hours  dextrose 5%. 1000 milliLiter(s) (100 mL/Hr) IV Continuous <Continuous>  dextrose 5%. 1000 milliLiter(s) (50 mL/Hr) IV Continuous <Continuous>  dextrose 50% Injectable 25 Gram(s) IV Push once  dextrose 50% Injectable 12.5 Gram(s) IV Push once  dextrose 50% Injectable 25 Gram(s) IV Push once  famotidine    Tablet 20 milliGRAM(s) Oral daily  furosemide    Tablet 40 milliGRAM(s) Oral once  glucagon  Injectable 1 milliGRAM(s) IntraMuscular once  HYDROmorphone  Injectable 0.5 milliGRAM(s) IV Push once  insulin lispro (ADMELOG) corrective regimen sliding scale   SubCutaneous Before meals and at bedtime  lisinopril 20 milliGRAM(s) Oral daily  metoprolol succinate ER 25 milliGRAM(s) Oral daily  multivitamin 1 Tablet(s) Oral daily  polyethylene glycol 3350 17 Gram(s) Oral two times a day  senna 2 Tablet(s) Oral at bedtime  tamsulosin 0.4 milliGRAM(s) Oral at bedtime    MEDICATIONS  (PRN):  albuterol    90 MICROgram(s) HFA Inhaler 1 Puff(s) Inhalation three times a day PRN Wheezing  benzocaine/menthol Lozenge 1 Lozenge Oral every 6 hours PRN Sore Throat  calcium carbonate    500 mG (Tums) Chewable 1 Tablet(s) Chew daily PRN Heartburn  dextrose Oral Gel 15 Gram(s) Oral once PRN Blood Glucose LESS THAN 70 milliGRAM(s)/deciliter  magnesium hydroxide Suspension 30 milliLiter(s) Oral daily PRN Constipation  melatonin 5 milliGRAM(s) Oral at bedtime PRN Sleep  ondansetron Injectable 4 milliGRAM(s) IV Push every 6 hours PRN Nausea and/or Vomiting      Allergies    statins (Hepatotoxicity)    Intolerances        OBJECTIVE:  Vital Signs Last 24 Hrs  T(C): 36.7 (02 Jan 2025 08:29), Max: 36.8 (01 Jan 2025 20:22)  T(F): 98 (02 Jan 2025 08:29), Max: 98.2 (01 Jan 2025 20:22)  HR: 86 (02 Jan 2025 08:29) (77 - 87)  BP: 117/65 (02 Jan 2025 08:29) (105/66 - 117/65)  BP(mean): --  RR: 18 (02 Jan 2025 08:29) (16 - 18)  SpO2: 95% (02 Jan 2025 08:29) (95% - 99%)    Parameters below as of 02 Jan 2025 08:29  Patient On (Oxygen Delivery Method): room air      I&O's Summary    01 Jan 2025 07:01  -  02 Jan 2025 07:00  --------------------------------------------------------  IN: 520 mL / OUT: 670 mL / NET: -150 mL    02 Jan 2025 07:01  -  02 Jan 2025 12:10  --------------------------------------------------------  IN: 540 mL / OUT: 200 mL / NET: 340 mL        PHYSICAL EXAM:  Gen: Resting in bed at time of exam, not in distress   CV: RRR, +S1/S2  Pulm: no wheezing , no crackles  no increase in work of breathing, no rhonchi today  Abd: soft, NTND  Skin: warm and dry, no new rashes   Ext: moving all 4 extremities spontaneously , trace edema, improved fr prior  Neuro: AOx3, no gross focal neurological deficits  Psych: affect and behavior appropriate, pleasant at time of interview    LABS:                        8.4    12.21 )-----------( 525      ( 02 Jan 2025 05:30 )             27.5     01-02    137  |  106  |  20  ----------------------------<  121[H]  4.0   |  19[L]  |  0.64    Ca    8.2[L]      02 Jan 2025 05:30  Mg     1.8     01-02          CAPILLARY BLOOD GLUCOSE      POCT Blood Glucose.: 143 mg/dL (02 Jan 2025 11:51)  POCT Blood Glucose.: 131 mg/dL (02 Jan 2025 07:12)  POCT Blood Glucose.: 151 mg/dL (01 Jan 2025 21:42)  POCT Blood Glucose.: 139 mg/dL (01 Jan 2025 17:32)    Urinalysis Basic - ( 02 Jan 2025 05:30 )    Color: x / Appearance: x / SG: x / pH: x  Gluc: 121 mg/dL / Ketone: x  / Bili: x / Urobili: x   Blood: x / Protein: x / Nitrite: x   Leuk Esterase: x / RBC: x / WBC x   Sq Epi: x / Non Sq Epi: x / Bacteria: x        MICRODATA:    Urinalysis with Rflx Culture (collected 01 Jan 2025 19:34)        RADIOLOGY/OTHER STUDIES:

## 2025-01-02 NOTE — PROGRESS NOTE ADULT - ASSESSMENT
84/M with CAD, Atrial fibrillation on Pradaxa, CVA (?), DM type 2, HTN and osteoarthritis who presented with chronic left hip pain x 6 months that failed conservative management and admitted for an elective YO by Dr. Echeverria.    #Left hip osteoarthritis   #Post op state  - pain controlled on Tylenol 1g q8h and PRN Tramadol  - Provide adequate analgesia, Incentive spirometry, mobilize with fall precautions, bowel regimen, DVT prophylaxis  - PT/OT    #low flow-low gradient AS  #prior HFmrEF  Satting well on RA, HDS, no wheezing today, bilateral pitting to 1+ LE edema  CXR 12/29: Pulmonary vascular congestion. No pneumothorax. Moderate bilateral pleural effusions. The cardiomediastinal silhouette is suboptimally evaluated. No acute osseous abnormality  ProBNP: 3554  Echo 08/24: Left ventricular systolic function is grossly mildly decreased with an ejection fraction visually estimated at 45 to 50 %. Regional wall motion abnormalities present. Left atrium is moderately dilated. Mild to moderate aortic regurgitation. Moderate aortic stenosis  Stress test 10/24: Normal myocardial perfusion scan, with no evidence of infarction or inducible ischemia  - sp 20mg IV lasix 12/29, 1/1, PO lasix 40 12/30, ptn volume status improved today, no diuresis today.  Given ptn low-flow low gradient aortic stenosis, will only consider diuresis very cautiously and evaluate volume status and need day by day.  Discussed w structural cardiology team.  - discussed w ptn and friend bedside need for daily weights and monitoring of leg edema to assess volume status, will potentially need low dose PRN PO lasix on discharge for when ptn becomes more hypervolemic, to be used w caution and w discussion w outpatient cardiologist  - repeat echo 12/29: EF 60-65%, low flow low gradient aortic stenosis, valve area 0.52  - Continue metoprolol 25, lisinopril 20  - Follows with cardiologist Dr AVELINO GALICIA, please ensure outpatient follow-up closely on discharge  - will need follow up w structural cardiology on discharge, please give patient information at discharge to the outpatient office,     #Leucocytosis, today in 12s  - likely iso UTI given dysuria and UA w WBCs, pending ucx  - on ctx plan for 3d course  - monitor temp and WBC trend    #persistent dry cough  - negative RVP 12/31, trial tessalon perles for symptoms  - sp SLP jackie, charlee FEES, ok to continue diet, speech to discuss w ENT re anatomy    #Constipation  - abdomen Xray no obstruction nor free air  - BM reported by the patient   - mobilize  - bowel regimen with Miralax BID and Senna once at night    #Acute blood loss anemia  - Monitor Hgb; no reported bleeding symptoms   - Transfuse for Hgb < 7  - Ensure Type and Screen available    #Urinary retention  - Lee catheter dced, now on condom cath, on flomax per urology  - ensure adequate bowel regimen in place    #CAD  #Atrial fibrillation  - rate controlled on Metoprolol, Amiodarone 100  - on dabigatran 150 q12    #HTN  - on Metoprolol, Amlodipine and Lisinopril  - ensure BP parameters in place    #DM type 2  - A1C 7, controlled  - on correctional insulin; goal glucose 100-180 mg/dL  - can resume Metformin on discharge  - DM diet    DVT ppx: Dabigatran

## 2025-01-02 NOTE — PROGRESS NOTE ADULT - SUBJECTIVE AND OBJECTIVE BOX
Ortho Note    Pt comfortable without complaints, pain controlled  Denies CP, SOB, N/V, numbness/tingling     Vital Signs Last 24 Hrs  T(C): 36.7 (27 Dec 2024 05:00), Max: 36.8 (26 Dec 2024 10:00)  T(F): 98 (27 Dec 2024 05:00), Max: 98.2 (26 Dec 2024 10:00)  HR: 69 (27 Dec 2024 05:00) (67 - 95)  BP: 134/53 (27 Dec 2024 05:00) (125/60 - 140/78)  BP(mean): --  RR: 18 (27 Dec 2024 05:00) (16 - 19)  SpO2: 96% (27 Dec 2024 05:00) (96% - 100%)    Parameters below as of 27 Dec 2024 05:00  Patient On (Oxygen Delivery Method): room air      General: Pt Alert and oriented, NAD  LLE   aquacell w/ 1 x 2 cm area of spotting at proximal aspect of dsg   Pulses: 2+ DP   Sensation: silt sural/saph/dpn/spn/tib   Motor: Quad/Ham/EHL/FHL/TA/GS 5/5 in strength                    84yM s/p Left YO by Dr. SATISH Echeverria on 12-17  - Weight Bearing Status: Protected Weight Bearing, no active abduction of left hip, no abduction pillow  - Pain control  - DVT PPx: Pradaxa, ASA  - F/u AM labs  -Pt progress:2 person assist   Dispo: out of pocket [facility] vs HPT    - UA + for UTI on 1/1/25, fu med recs for abx choice     Ortho Pager [phone]

## 2025-01-03 LAB
ANION GAP SERPL CALC-SCNC: 12 MMOL/L — SIGNIFICANT CHANGE UP (ref 5–17)
BUN SERPL-MCNC: 23 MG/DL — SIGNIFICANT CHANGE UP (ref 7–23)
CALCIUM SERPL-MCNC: 8.3 MG/DL — LOW (ref 8.4–10.5)
CHLORIDE SERPL-SCNC: 108 MMOL/L — SIGNIFICANT CHANGE UP (ref 96–108)
CO2 SERPL-SCNC: 19 MMOL/L — LOW (ref 22–31)
CREAT SERPL-MCNC: 0.68 MG/DL — SIGNIFICANT CHANGE UP (ref 0.5–1.3)
EGFR: 91 ML/MIN/1.73M2 — SIGNIFICANT CHANGE UP
GLUCOSE SERPL-MCNC: 110 MG/DL — HIGH (ref 70–99)
HCT VFR BLD CALC: 26.5 % — LOW (ref 39–50)
HGB BLD-MCNC: 7.9 G/DL — LOW (ref 13–17)
MCHC RBC-ENTMCNC: 27.2 PG — SIGNIFICANT CHANGE UP (ref 27–34)
MCHC RBC-ENTMCNC: 29.8 G/DL — LOW (ref 32–36)
MCV RBC AUTO: 91.4 FL — SIGNIFICANT CHANGE UP (ref 80–100)
NRBC # BLD: 0 /100 WBCS — SIGNIFICANT CHANGE UP (ref 0–0)
PLATELET # BLD AUTO: 635 K/UL — HIGH (ref 150–400)
POTASSIUM SERPL-MCNC: 3.8 MMOL/L — SIGNIFICANT CHANGE UP (ref 3.5–5.3)
POTASSIUM SERPL-SCNC: 3.8 MMOL/L — SIGNIFICANT CHANGE UP (ref 3.5–5.3)
RBC # BLD: 2.9 M/UL — LOW (ref 4.2–5.8)
RBC # FLD: 16.1 % — HIGH (ref 10.3–14.5)
SODIUM SERPL-SCNC: 139 MMOL/L — SIGNIFICANT CHANGE UP (ref 135–145)
WBC # BLD: 10.86 K/UL — HIGH (ref 3.8–10.5)
WBC # FLD AUTO: 10.86 K/UL — HIGH (ref 3.8–10.5)

## 2025-01-03 PROCEDURE — 99233 SBSQ HOSP IP/OBS HIGH 50: CPT

## 2025-01-03 RX ORDER — FUROSEMIDE 20 MG
20 TABLET ORAL ONCE
Refills: 0 | Status: COMPLETED | OUTPATIENT
Start: 2025-01-03 | End: 2025-01-03

## 2025-01-03 RX ADMIN — IPRATROPIUM BROMIDE AND ALBUTEROL SULFATE 3 MILLILITER(S): .5; 2.5 SOLUTION RESPIRATORY (INHALATION) at 05:37

## 2025-01-03 RX ADMIN — Medication 5 MILLIGRAM(S): at 22:38

## 2025-01-03 RX ADMIN — IPRATROPIUM BROMIDE AND ALBUTEROL SULFATE 3 MILLILITER(S): .5; 2.5 SOLUTION RESPIRATORY (INHALATION) at 14:54

## 2025-01-03 RX ADMIN — Medication 1 TABLET(S): at 12:54

## 2025-01-03 RX ADMIN — Medication 25 MILLIGRAM(S): at 05:36

## 2025-01-03 RX ADMIN — IPRATROPIUM BROMIDE AND ALBUTEROL SULFATE 3 MILLILITER(S): .5; 2.5 SOLUTION RESPIRATORY (INHALATION) at 22:37

## 2025-01-03 RX ADMIN — Medication 5 MILLIGRAM(S): at 05:35

## 2025-01-03 RX ADMIN — ACETAMINOPHEN 1000 MILLIGRAM(S): 80 SOLUTION/ DROPS ORAL at 22:00

## 2025-01-03 RX ADMIN — DABIGATRAN ETEXILATE 150 MILLIGRAM(S): 110 CAPSULE ORAL at 19:15

## 2025-01-03 RX ADMIN — Medication 100 MILLIGRAM(S): at 22:38

## 2025-01-03 RX ADMIN — DABIGATRAN ETEXILATE 150 MILLIGRAM(S): 110 CAPSULE ORAL at 05:37

## 2025-01-03 RX ADMIN — FAMOTIDINE 20 MILLIGRAM(S): 20 TABLET, FILM COATED ORAL at 12:54

## 2025-01-03 RX ADMIN — SENNOSIDES 2 TABLET(S): 8.6 TABLET, FILM COATED ORAL at 22:38

## 2025-01-03 RX ADMIN — Medication 2: at 12:50

## 2025-01-03 RX ADMIN — Medication 20 MILLIGRAM(S): at 12:50

## 2025-01-03 RX ADMIN — Medication 100 MILLIGRAM(S): at 05:37

## 2025-01-03 RX ADMIN — AMIODARONE HYDROCHLORIDE 100 MILLIGRAM(S): 200 TABLET ORAL at 05:36

## 2025-01-03 RX ADMIN — ACETAMINOPHEN 1000 MILLIGRAM(S): 80 SOLUTION/ DROPS ORAL at 05:35

## 2025-01-03 RX ADMIN — LISINOPRIL 20 MILLIGRAM(S): 30 TABLET ORAL at 05:36

## 2025-01-03 RX ADMIN — ACETAMINOPHEN 1000 MILLIGRAM(S): 80 SOLUTION/ DROPS ORAL at 14:49

## 2025-01-03 RX ADMIN — Medication 100 MILLIGRAM(S): at 14:49

## 2025-01-03 RX ADMIN — Medication 81 MILLIGRAM(S): at 12:54

## 2025-01-03 RX ADMIN — CEFTRIAXONE SODIUM 100 MILLIGRAM(S): 1 INJECTION, POWDER, FOR SOLUTION INTRAMUSCULAR; INTRAVENOUS at 07:24

## 2025-01-03 NOTE — PROGRESS NOTE ADULT - SUBJECTIVE AND OBJECTIVE BOX
Ortho Note    Subjective:  Pt comfortable without complaints, Axox4, able to make needs known, pain controlled with current pain medication regimen   Denies CP, SOB, N/V, numbness/tingling   Reviewed plan of care with patient,       Vital Signs Last 24 Hrs  T(C): 36.1 (01-03-25 @ 08:22), Max: 36.1 (01-03-25 @ 08:22)  T(F): 97 (01-03-25 @ 08:22), Max: 97 (01-03-25 @ 08:22)  HR: 83 (01-03-25 @ 08:22) (83 - 83)  BP: 102/66 (01-03-25 @ 08:22) (102/66 - 102/66)  BP(mean): --  RR: 16 (01-03-25 @ 08:22) (16 - 16)  SpO2: 97% (01-03-25 @ 08:22) (97% - 97%)  AVSS    Objective:    Physical Exam:  General: Pt Alert and oriented, NAD  LLE Prineo  DSG C/D/I  Pulses: 2+ DP   Sensation: silt bilateral LE   Motor: Quad/Ham/EHL/FHL/TA/GS 5/5 in strength   juarez  +1 bilateral LE edema           84yM s/p Left YO by Dr. SATISH Echeverria on 12-17  - afebrile, wbcs 10.86  - Weight Bearing Status: WBAT with walker at all times, no active abduction of left hip, no abduction pillow  - Pain control- tylenol 1000mg PO Q8h, tramadol 50mg PO Q6th prn mild pain   - DVT PPx: Pradaxa 150mg PO Q12h, ASA 81mg PO daily   - appreciate medicine recs - lasix 20mg PO x1   - Pt progress: 2 person max assist   - bowel regimen, IS use, PPI   - UTI treatment, ceftriaxone IV Q24 x3 days 1-2 through 1-5    Dispo: out of pocket [facility] vs HPT, pending patient progression versus  paying Out of pocket        Ortho Pager [phone]

## 2025-01-03 NOTE — PROGRESS NOTE ADULT - SUBJECTIVE AND OBJECTIVE BOX
Ortho Note    Pt comfortable without complaints, pain controlled  Denies CP, SOB, N/V, numbness/tingling     Vital Signs Last 24 Hrs  T(C): 36.7 (27 Dec 2024 05:00), Max: 36.8 (26 Dec 2024 10:00)  T(F): 98 (27 Dec 2024 05:00), Max: 98.2 (26 Dec 2024 10:00)  HR: 69 (27 Dec 2024 05:00) (67 - 95)  BP: 134/53 (27 Dec 2024 05:00) (125/60 - 140/78)  BP(mean): --  RR: 18 (27 Dec 2024 05:00) (16 - 19)  SpO2: 96% (27 Dec 2024 05:00) (96% - 100%)    Parameters below as of 27 Dec 2024 05:00  Patient On (Oxygen Delivery Method): room air      General: Pt Alert and oriented, NAD  LLE   aquacell w/ 1 x 2 cm area of spotting at proximal aspect of dsg   Pulses: 2+ DP   Sensation: silt sural/saph/dpn/spn/tib   Motor: Quad/Ham/EHL/FHL/TA/GS 5/5 in strength                    84yM s/p Left YO by Dr. SATISH Echeverria on 12-17  - Weight Bearing Status: Protected Weight Bearing, no active abduction of left hip, no abduction pillow  - Pain control  - DVT PPx: Pradaxa, ASA  - F/u AM labs  -Pt progress:2 person assist   Dispo: out of pocket [facility] vs HPT    - UA + for UTI on 1/1/25, CTX for 3 days starting 1/2    Ortho Pager [phone]

## 2025-01-03 NOTE — PROGRESS NOTE ADULT - ASSESSMENT
84/M with CAD, Atrial fibrillation on Pradaxa, CVA (?), DM type 2, HTN and osteoarthritis who presented with chronic left hip pain x 6 months that failed conservative management and admitted for an elective YO by Dr. Echeverria.    #Left hip osteoarthritis   #Post op state  - pain controlled on Tylenol 1g q8h and PRN Tramadol  - Provide adequate analgesia, Incentive spirometry, mobilize with fall precautions, bowel regimen, DVT prophylaxis  - PT/OT    #low flow-low gradient AS  #prior HFmrEF  Satting well on RA, HDS, no wheezing today, bilateral pitting to 1+ LE edema  CXR 12/29: Pulmonary vascular congestion. No pneumothorax. Moderate bilateral pleural effusions. The cardiomediastinal silhouette is suboptimally evaluated. No acute osseous abnormality  ProBNP: 3554  Echo 08/24: Left ventricular systolic function is grossly mildly decreased with an ejection fraction visually estimated at 45 to 50 %. Regional wall motion abnormalities present. Left atrium is moderately dilated. Mild to moderate aortic regurgitation. Moderate aortic stenosis  Stress test 10/24: Normal myocardial perfusion scan, with no evidence of infarction or inducible ischemia  - sp 20mg IV lasix 12/29, 1/1, PO lasix 40 12/30, will trial PO 20 lasix today and monitor for response given increased leg edema.  Given ptn low-flow low gradient aortic stenosis, will only consider diuresis very cautiously and evaluate volume status and need day by day.  Discussed w structural cardiology team.  - discussed w ptn and friend bedside need for daily weights and monitoring of leg edema to assess volume status, will potentially need low dose PRN PO lasix on discharge for when ptn becomes more hypervolemic, to be used w caution and w discussion w outpatient cardiologist  - repeat echo 12/29: EF 60-65%, low flow low gradient aortic stenosis, valve area 0.52  - Continue metoprolol 25, lisinopril 20  - Follows with cardiologist Dr AVELINO GALICIA, please ensure outpatient follow-up closely on discharge  - will need follow up w structural cardiology on discharge, please give patient information at discharge to the outpatient office,     #Leucocytosis, today in 10s, improving  - likely iso UTI given dysuria and UA w WBCs, pending ucx  - on ctx plan for 3d course  - monitor temp and WBC trend    #persistent dry cough, improving  - negative RVP 12/31, trial tessalon perles for symptoms  - sp SLP eval, sp FEES, ok to continue diet, speech to discuss w ENT re anatomy    #Constipation  - abdomen Xray no obstruction nor free air  - BM reported by the patient   - mobilize  - bowel regimen with Miralax BID and Senna once at night    #Acute blood loss anemia  - Monitor Hgb; no reported bleeding symptoms   - Transfuse for Hgb < 7  - Ensure Type and Screen available    #Urinary retention  - Lee catheter dced, now on condom cath, on flomax per urology  - ensure adequate bowel regimen in place    #CAD  #Atrial fibrillation  - rate controlled on Metoprolol, Amiodarone 100  - on dabigatran 150 q12    #HTN  - on Metoprolol, Amlodipine and Lisinopril  - ensure BP parameters in place    #DM type 2  - A1C 7, controlled  - on correctional insulin; goal glucose 100-180 mg/dL  - can resume Metformin on discharge  - DM diet    DVT ppx: Dabigatran

## 2025-01-03 NOTE — PROGRESS NOTE ADULT - SUBJECTIVE AND OBJECTIVE BOX
Patient is a 84y old  Male who presents with a chief complaint of left hip pain (03 Jan 2025 06:57)        SUBJECTIVE:  Patient was seen and examined at bedside, states had coughing spell this early AM but none since, states overall cough is improved from days prior.  Denies CP, SOB, pain.    Overnight Events : NAEON    Last Bowel Movement: 31-Dec-2024 (01-02)  Last Bowel Movement: 31-Dec-2024 (01-02)  Last Bowel Movement: 31-Jan-2025 (01-01)  Last Bowel Movement: 31-Dec-2024 (01-01)  Last Bowel Movement: 31-Dec-2024 (12-31)  Last Bowel Movement: 30-Dec-2024 (12-31)  Last Bowel Movement: 30-Dec-2024 (12-30)  Last Bowel Movement: 28-Dec-2024 (12-30)  Last Bowel Movement: 28-Dec-2024 (12-29)  Last Bowel Movement: 28-Dec-2024 (12-29)  Last Bowel Movement: 28-Dec-2024 (12-28)  Last Bowel Movement: 27-Dec-2024 (12-28)  Last Bowel Movement: 25-Dec-2024 (12-27)      Review of systems: 12 point Review of systems negative unless otherwise documented elsewhere in note.     Diet, Consistent Carbohydrate/No Snacks:   Supplement Feeding Modality:  Oral  Ensure Max Cans or Servings Per Day:  1       Frequency:  Three Times a day (12-20-24 @ 13:26) [Active]      MEDICATIONS:  MEDICATIONS  (STANDING):  acetaminophen     Tablet .. 1000 milliGRAM(s) Oral every 8 hours  albuterol    90 MICROgram(s) HFA Inhaler 1 Puff(s) Inhalation once  albuterol/ipratropium for Nebulization 3 milliLiter(s) Nebulizer every 8 hours  aMIOdarone    Tablet 100 milliGRAM(s) Oral daily  amLODIPine   Tablet 5 milliGRAM(s) Oral daily  aspirin enteric coated 81 milliGRAM(s) Oral daily  benzonatate 100 milliGRAM(s) Oral three times a day  cefTRIAXone   IVPB 1000 milliGRAM(s) IV Intermittent every 24 hours  dabigatran 150 milliGRAM(s) Oral every 12 hours  dextrose 5%. 1000 milliLiter(s) (100 mL/Hr) IV Continuous <Continuous>  dextrose 5%. 1000 milliLiter(s) (50 mL/Hr) IV Continuous <Continuous>  dextrose 50% Injectable 25 Gram(s) IV Push once  dextrose 50% Injectable 12.5 Gram(s) IV Push once  dextrose 50% Injectable 25 Gram(s) IV Push once  famotidine    Tablet 20 milliGRAM(s) Oral daily  furosemide    Tablet 40 milliGRAM(s) Oral once  furosemide    Tablet 20 milliGRAM(s) Oral once  glucagon  Injectable 1 milliGRAM(s) IntraMuscular once  HYDROmorphone  Injectable 0.5 milliGRAM(s) IV Push once  insulin lispro (ADMELOG) corrective regimen sliding scale   SubCutaneous Before meals and at bedtime  lisinopril 20 milliGRAM(s) Oral daily  metoprolol succinate ER 25 milliGRAM(s) Oral daily  multivitamin 1 Tablet(s) Oral daily  polyethylene glycol 3350 17 Gram(s) Oral two times a day  senna 2 Tablet(s) Oral at bedtime  tamsulosin 0.4 milliGRAM(s) Oral at bedtime    MEDICATIONS  (PRN):  albuterol    90 MICROgram(s) HFA Inhaler 1 Puff(s) Inhalation three times a day PRN Wheezing  benzocaine/menthol Lozenge 1 Lozenge Oral every 6 hours PRN Sore Throat  calcium carbonate    500 mG (Tums) Chewable 1 Tablet(s) Chew daily PRN Heartburn  dextrose Oral Gel 15 Gram(s) Oral once PRN Blood Glucose LESS THAN 70 milliGRAM(s)/deciliter  magnesium hydroxide Suspension 30 milliLiter(s) Oral daily PRN Constipation  melatonin 5 milliGRAM(s) Oral at bedtime PRN Sleep  ondansetron Injectable 4 milliGRAM(s) IV Push every 6 hours PRN Nausea and/or Vomiting      Allergies    statins (Hepatotoxicity)    Intolerances        OBJECTIVE:  Vital Signs Last 24 Hrs  T(C): 36.1 (03 Jan 2025 08:22), Max: 36.9 (02 Jan 2025 12:50)  T(F): 97 (03 Jan 2025 08:22), Max: 98.4 (02 Jan 2025 12:50)  HR: 83 (03 Jan 2025 08:22) (77 - 86)  BP: 102/66 (03 Jan 2025 08:22) (100/56 - 121/67)  BP(mean): --  RR: 16 (03 Jan 2025 08:22) (16 - 20)  SpO2: 97% (03 Jan 2025 08:22) (93% - 98%)    Parameters below as of 03 Jan 2025 08:22  Patient On (Oxygen Delivery Method): room air      I&O's Summary    02 Jan 2025 07:01  -  03 Jan 2025 07:00  --------------------------------------------------------  IN: 1180 mL / OUT: 500 mL / NET: 680 mL    03 Jan 2025 07:01  -  03 Jan 2025 11:51  --------------------------------------------------------  IN: 480 mL / OUT: 0 mL / NET: 480 mL        PHYSICAL EXAM:  Gen: Resting in bed at time of exam, not in distress   CV: +S1/S2, blowing systolic murmur  Pulm: no wheezing , no crackles  no increase in work of breathing  Abd: soft, NTND  Skin: warm and dry, no new rashes   Ext: moving all 4 extremities spontaneously , 1+ edema  ,  Neuro: AOx3, no gross focal neurological deficits  Psych: affect and behavior appropriate, pleasant at time of interview    LABS:                        7.9    10.86 )-----------( 635      ( 03 Jan 2025 05:30 )             26.5     01-03    139  |  108  |  23  ----------------------------<  110[H]  3.8   |  19[L]  |  0.68    Ca    8.3[L]      03 Jan 2025 05:30  Mg     1.8     01-02          CAPILLARY BLOOD GLUCOSE      POCT Blood Glucose.: 119 mg/dL (03 Jan 2025 07:27)  POCT Blood Glucose.: 183 mg/dL (02 Jan 2025 22:12)  POCT Blood Glucose.: 149 mg/dL (02 Jan 2025 17:20)    Urinalysis Basic - ( 03 Jan 2025 05:30 )    Color: x / Appearance: x / SG: x / pH: x  Gluc: 110 mg/dL / Ketone: x  / Bili: x / Urobili: x   Blood: x / Protein: x / Nitrite: x   Leuk Esterase: x / RBC: x / WBC x   Sq Epi: x / Non Sq Epi: x / Bacteria: x        MICRODATA:    Culture - Urine (collected 01 Jan 2025 19:34)  Source: Clean Catch None  Preliminary Report (02 Jan 2025 21:17):    >100,000 CFU/ml Escherichia coli    Urinalysis with Rflx Culture (collected 01 Jan 2025 19:34)        RADIOLOGY/OTHER STUDIES:

## 2025-01-04 LAB
ANION GAP SERPL CALC-SCNC: 11 MMOL/L — SIGNIFICANT CHANGE UP (ref 5–17)
BUN SERPL-MCNC: 21 MG/DL — SIGNIFICANT CHANGE UP (ref 7–23)
CALCIUM SERPL-MCNC: 8.4 MG/DL — SIGNIFICANT CHANGE UP (ref 8.4–10.5)
CHLORIDE SERPL-SCNC: 107 MMOL/L — SIGNIFICANT CHANGE UP (ref 96–108)
CO2 SERPL-SCNC: 22 MMOL/L — SIGNIFICANT CHANGE UP (ref 22–31)
CREAT SERPL-MCNC: 0.61 MG/DL — SIGNIFICANT CHANGE UP (ref 0.5–1.3)
EGFR: 94 ML/MIN/1.73M2 — SIGNIFICANT CHANGE UP
GLUCOSE SERPL-MCNC: 119 MG/DL — HIGH (ref 70–99)
HCT VFR BLD CALC: 28.4 % — LOW (ref 39–50)
HGB BLD-MCNC: 8.6 G/DL — LOW (ref 13–17)
MCHC RBC-ENTMCNC: 27.6 PG — SIGNIFICANT CHANGE UP (ref 27–34)
MCHC RBC-ENTMCNC: 30.3 G/DL — LOW (ref 32–36)
MCV RBC AUTO: 91 FL — SIGNIFICANT CHANGE UP (ref 80–100)
NRBC # BLD: 0 /100 WBCS — SIGNIFICANT CHANGE UP (ref 0–0)
PLATELET # BLD AUTO: 710 K/UL — HIGH (ref 150–400)
POTASSIUM SERPL-MCNC: 3.5 MMOL/L — SIGNIFICANT CHANGE UP (ref 3.5–5.3)
POTASSIUM SERPL-SCNC: 3.5 MMOL/L — SIGNIFICANT CHANGE UP (ref 3.5–5.3)
RBC # BLD: 3.12 M/UL — LOW (ref 4.2–5.8)
RBC # FLD: 16.1 % — HIGH (ref 10.3–14.5)
SODIUM SERPL-SCNC: 140 MMOL/L — SIGNIFICANT CHANGE UP (ref 135–145)
WBC # BLD: 9.18 K/UL — SIGNIFICANT CHANGE UP (ref 3.8–10.5)
WBC # FLD AUTO: 9.18 K/UL — SIGNIFICANT CHANGE UP (ref 3.8–10.5)

## 2025-01-04 PROCEDURE — 99233 SBSQ HOSP IP/OBS HIGH 50: CPT

## 2025-01-04 RX ADMIN — Medication 17 GRAM(S): at 06:09

## 2025-01-04 RX ADMIN — Medication 100 MILLIGRAM(S): at 14:30

## 2025-01-04 RX ADMIN — Medication 100 MILLIGRAM(S): at 06:09

## 2025-01-04 RX ADMIN — ACETAMINOPHEN 1000 MILLIGRAM(S): 80 SOLUTION/ DROPS ORAL at 06:10

## 2025-01-04 RX ADMIN — LISINOPRIL 20 MILLIGRAM(S): 30 TABLET ORAL at 06:11

## 2025-01-04 RX ADMIN — DABIGATRAN ETEXILATE 150 MILLIGRAM(S): 110 CAPSULE ORAL at 17:41

## 2025-01-04 RX ADMIN — IPRATROPIUM BROMIDE AND ALBUTEROL SULFATE 3 MILLILITER(S): .5; 2.5 SOLUTION RESPIRATORY (INHALATION) at 21:22

## 2025-01-04 RX ADMIN — SENNOSIDES 2 TABLET(S): 8.6 TABLET, FILM COATED ORAL at 21:21

## 2025-01-04 RX ADMIN — DABIGATRAN ETEXILATE 150 MILLIGRAM(S): 110 CAPSULE ORAL at 06:10

## 2025-01-04 RX ADMIN — AMIODARONE HYDROCHLORIDE 100 MILLIGRAM(S): 200 TABLET ORAL at 06:10

## 2025-01-04 RX ADMIN — ACETAMINOPHEN 1000 MILLIGRAM(S): 80 SOLUTION/ DROPS ORAL at 15:30

## 2025-01-04 RX ADMIN — Medication 81 MILLIGRAM(S): at 10:55

## 2025-01-04 RX ADMIN — Medication 6: at 17:34

## 2025-01-04 RX ADMIN — TAMSULOSIN HYDROCHLORIDE 0.4 MILLIGRAM(S): 0.4 CAPSULE ORAL at 21:22

## 2025-01-04 RX ADMIN — ACETAMINOPHEN 1000 MILLIGRAM(S): 80 SOLUTION/ DROPS ORAL at 14:30

## 2025-01-04 RX ADMIN — IPRATROPIUM BROMIDE AND ALBUTEROL SULFATE 3 MILLILITER(S): .5; 2.5 SOLUTION RESPIRATORY (INHALATION) at 14:30

## 2025-01-04 RX ADMIN — Medication 100 MILLIGRAM(S): at 21:22

## 2025-01-04 RX ADMIN — IPRATROPIUM BROMIDE AND ALBUTEROL SULFATE 3 MILLILITER(S): .5; 2.5 SOLUTION RESPIRATORY (INHALATION) at 06:09

## 2025-01-04 RX ADMIN — Medication 1 TABLET(S): at 10:54

## 2025-01-04 RX ADMIN — Medication 5 MILLIGRAM(S): at 06:10

## 2025-01-04 RX ADMIN — Medication 25 MILLIGRAM(S): at 06:11

## 2025-01-04 RX ADMIN — ACETAMINOPHEN 1000 MILLIGRAM(S): 80 SOLUTION/ DROPS ORAL at 21:22

## 2025-01-04 RX ADMIN — FAMOTIDINE 20 MILLIGRAM(S): 20 TABLET, FILM COATED ORAL at 10:54

## 2025-01-04 RX ADMIN — CALCIUM CARBONATE 1 TABLET(S): 750 TABLET, CHEWABLE ORAL at 19:07

## 2025-01-04 RX ADMIN — Medication 2: at 12:23

## 2025-01-04 RX ADMIN — CEFTRIAXONE SODIUM 100 MILLIGRAM(S): 1 INJECTION, POWDER, FOR SOLUTION INTRAMUSCULAR; INTRAVENOUS at 06:09

## 2025-01-04 NOTE — PROGRESS NOTE ADULT - SUBJECTIVE AND OBJECTIVE BOX
Ortho Note    Pt comfortable without complaints, pain controlled  Denies CP, SOB, N/V, numbness/tingling     Vital Signs Last 24 Hrs  T(C): 36.7 (27 Dec 2024 05:00), Max: 36.8 (26 Dec 2024 10:00)  T(F): 98 (27 Dec 2024 05:00), Max: 98.2 (26 Dec 2024 10:00)  HR: 69 (27 Dec 2024 05:00) (67 - 95)  BP: 134/53 (27 Dec 2024 05:00) (125/60 - 140/78)  BP(mean): --  RR: 18 (27 Dec 2024 05:00) (16 - 19)  SpO2: 96% (27 Dec 2024 05:00) (96% - 100%)    Parameters below as of 27 Dec 2024 05:00  Patient On (Oxygen Delivery Method): room air      General: Pt Alert and oriented, NAD  LLE   Pulses: 2+ DP   Sensation: silt sural/saph/dpn/spn/tib   Motor: Quad/Ham/EHL/FHL/TA/GS 5/5 in strength                    84yM s/p Left YO by Dr. SATISH Echeverria on 12-17  - Weight Bearing Status: Protected Weight Bearing, no active abduction of left hip, no abduction pillow  - Pain control  - DVT PPx: Pradaxa, ASA  - F/u AM labs  -Pt progress:2 person assist   Dispo: out of pocket [facility] vs HPT    - UA + for UTI on 1/1/25, CTX for 3 days starting 1/2    Ortho Pager [phone]

## 2025-01-04 NOTE — PROGRESS NOTE ADULT - SUBJECTIVE AND OBJECTIVE BOX
Patient is a 84y old  Male who presents with a chief complaint of left hip pain (04 Jan 2025 06:59)        SUBJECTIVE:  Patient was seen and examined at bedside, still endorsing some cough, otherwise no acute complaints    Overnight Events : NAEON    Last Bowel Movement: 04-Jan-2025 (01-04)  Last Bowel Movement: 03-Jan-2025 (01-03)  Last Bowel Movement: 31-Dec-2024 (01-02)  Last Bowel Movement: 31-Dec-2024 (01-02)  Last Bowel Movement: 31-Jan-2025 (01-01)  Last Bowel Movement: 31-Dec-2024 (01-01)  Last Bowel Movement: 31-Dec-2024 (12-31)  Last Bowel Movement: 30-Dec-2024 (12-31)  Last Bowel Movement: 30-Dec-2024 (12-30)  Last Bowel Movement: 28-Dec-2024 (12-30)  Last Bowel Movement: 28-Dec-2024 (12-29)  Last Bowel Movement: 28-Dec-2024 (12-29)  Last Bowel Movement: 28-Dec-2024 (12-28)      Review of systems: 12 point Review of systems negative unless otherwise documented elsewhere in note.     Diet, Consistent Carbohydrate/No Snacks:   Supplement Feeding Modality:  Oral  Ensure Max Cans or Servings Per Day:  1       Frequency:  Three Times a day (12-20-24 @ 13:26) [Active]      MEDICATIONS:  MEDICATIONS  (STANDING):  acetaminophen     Tablet .. 1000 milliGRAM(s) Oral every 8 hours  albuterol    90 MICROgram(s) HFA Inhaler 1 Puff(s) Inhalation once  albuterol/ipratropium for Nebulization 3 milliLiter(s) Nebulizer every 8 hours  aMIOdarone    Tablet 100 milliGRAM(s) Oral daily  amLODIPine   Tablet 5 milliGRAM(s) Oral daily  aspirin enteric coated 81 milliGRAM(s) Oral daily  benzonatate 100 milliGRAM(s) Oral three times a day  dabigatran 150 milliGRAM(s) Oral every 12 hours  dextrose 5%. 1000 milliLiter(s) (100 mL/Hr) IV Continuous <Continuous>  dextrose 5%. 1000 milliLiter(s) (50 mL/Hr) IV Continuous <Continuous>  dextrose 50% Injectable 25 Gram(s) IV Push once  dextrose 50% Injectable 12.5 Gram(s) IV Push once  dextrose 50% Injectable 25 Gram(s) IV Push once  famotidine    Tablet 20 milliGRAM(s) Oral daily  furosemide    Tablet 40 milliGRAM(s) Oral once  glucagon  Injectable 1 milliGRAM(s) IntraMuscular once  HYDROmorphone  Injectable 0.5 milliGRAM(s) IV Push once  insulin lispro (ADMELOG) corrective regimen sliding scale   SubCutaneous Before meals and at bedtime  lisinopril 20 milliGRAM(s) Oral daily  metoprolol succinate ER 25 milliGRAM(s) Oral daily  multivitamin 1 Tablet(s) Oral daily  polyethylene glycol 3350 17 Gram(s) Oral two times a day  senna 2 Tablet(s) Oral at bedtime  tamsulosin 0.4 milliGRAM(s) Oral at bedtime    MEDICATIONS  (PRN):  albuterol    90 MICROgram(s) HFA Inhaler 1 Puff(s) Inhalation three times a day PRN Wheezing  benzocaine/menthol Lozenge 1 Lozenge Oral every 6 hours PRN Sore Throat  calcium carbonate    500 mG (Tums) Chewable 1 Tablet(s) Chew daily PRN Heartburn  dextrose Oral Gel 15 Gram(s) Oral once PRN Blood Glucose LESS THAN 70 milliGRAM(s)/deciliter  magnesium hydroxide Suspension 30 milliLiter(s) Oral daily PRN Constipation  melatonin 5 milliGRAM(s) Oral at bedtime PRN Sleep  ondansetron Injectable 4 milliGRAM(s) IV Push every 6 hours PRN Nausea and/or Vomiting      Allergies    statins (Hepatotoxicity)    Intolerances        OBJECTIVE:  Vital Signs Last 24 Hrs  T(C): 36.7 (04 Jan 2025 09:33), Max: 36.8 (03 Jan 2025 14:07)  T(F): 98.1 (04 Jan 2025 09:33), Max: 98.2 (03 Jan 2025 14:07)  HR: 77 (04 Jan 2025 09:33) (77 - 92)  BP: 97/62 (04 Jan 2025 09:33) (97/62 - 153/81)  BP(mean): 73 (04 Jan 2025 09:33) (73 - 73)  RR: 18 (04 Jan 2025 09:33) (17 - 18)  SpO2: 99% (04 Jan 2025 09:33) (97% - 100%)    Parameters below as of 04 Jan 2025 09:33  Patient On (Oxygen Delivery Method): room air      I&O's Summary    03 Jan 2025 07:01  -  04 Jan 2025 07:00  --------------------------------------------------------  IN: 660 mL / OUT: 1350 mL / NET: -690 mL        PHYSICAL EXAM:  Gen: Resting in bed at time of exam, not in distress   HEENT: moist mucosa, no lesions   Neck: supple, trachea at midline  CV: RRR, +S1/S2  Pulm: no wheezing , no crackles  no increase in work of breathing  Abd: soft, NTND  Skin: warm and dry, no new rashes   Ext: moving all 4 extremities spontaneously , trace edema, improved fr prior  Neuro: AOx3, no gross focal neurological deficits  Psych: affect and behavior appropriate, pleasant at time of interview    LABS:                        8.6    9.18  )-----------( 710      ( 04 Jan 2025 07:28 )             28.4     01-04    140  |  107  |  21  ----------------------------<  119[H]  3.5   |  22  |  0.61    Ca    8.4      04 Jan 2025 07:28          CAPILLARY BLOOD GLUCOSE      POCT Blood Glucose.: 183 mg/dL (04 Jan 2025 11:59)  POCT Blood Glucose.: 124 mg/dL (04 Jan 2025 07:40)  POCT Blood Glucose.: 146 mg/dL (03 Jan 2025 22:38)  POCT Blood Glucose.: 138 mg/dL (03 Jan 2025 17:43)    Urinalysis Basic - ( 04 Jan 2025 07:28 )    Color: x / Appearance: x / SG: x / pH: x  Gluc: 119 mg/dL / Ketone: x  / Bili: x / Urobili: x   Blood: x / Protein: x / Nitrite: x   Leuk Esterase: x / RBC: x / WBC x   Sq Epi: x / Non Sq Epi: x / Bacteria: x        MICRODATA:    Culture - Urine (collected 01 Jan 2025 19:34)  Source: Clean Catch None  Final Report (03 Jan 2025 19:54):    >100,000 CFU/ml Escherichia coli    <10,000 CFU/ml Normal Urogenital maryam present  Organism: Escherichia coli (03 Jan 2025 19:54)  Organism: Escherichia coli (03 Jan 2025 19:54)    Urinalysis with Rflx Culture (collected 01 Jan 2025 19:34)        RADIOLOGY/OTHER STUDIES:

## 2025-01-05 LAB
ANION GAP SERPL CALC-SCNC: 11 MMOL/L — SIGNIFICANT CHANGE UP (ref 5–17)
BUN SERPL-MCNC: 26 MG/DL — HIGH (ref 7–23)
CALCIUM SERPL-MCNC: 8.4 MG/DL — SIGNIFICANT CHANGE UP (ref 8.4–10.5)
CHLORIDE SERPL-SCNC: 108 MMOL/L — SIGNIFICANT CHANGE UP (ref 96–108)
CO2 SERPL-SCNC: 23 MMOL/L — SIGNIFICANT CHANGE UP (ref 22–31)
CREAT SERPL-MCNC: 0.67 MG/DL — SIGNIFICANT CHANGE UP (ref 0.5–1.3)
EGFR: 91 ML/MIN/1.73M2 — SIGNIFICANT CHANGE UP
GLUCOSE SERPL-MCNC: 151 MG/DL — HIGH (ref 70–99)
HCT VFR BLD CALC: 28.1 % — LOW (ref 39–50)
HGB BLD-MCNC: 8.4 G/DL — LOW (ref 13–17)
MCHC RBC-ENTMCNC: 27.3 PG — SIGNIFICANT CHANGE UP (ref 27–34)
MCHC RBC-ENTMCNC: 29.9 G/DL — LOW (ref 32–36)
MCV RBC AUTO: 91.2 FL — SIGNIFICANT CHANGE UP (ref 80–100)
NRBC # BLD: 0 /100 WBCS — SIGNIFICANT CHANGE UP (ref 0–0)
PLATELET # BLD AUTO: 754 K/UL — HIGH (ref 150–400)
POTASSIUM SERPL-MCNC: 3.6 MMOL/L — SIGNIFICANT CHANGE UP (ref 3.5–5.3)
POTASSIUM SERPL-SCNC: 3.6 MMOL/L — SIGNIFICANT CHANGE UP (ref 3.5–5.3)
RBC # BLD: 3.08 M/UL — LOW (ref 4.2–5.8)
RBC # FLD: 16 % — HIGH (ref 10.3–14.5)
SODIUM SERPL-SCNC: 142 MMOL/L — SIGNIFICANT CHANGE UP (ref 135–145)
WBC # BLD: 8.6 K/UL — SIGNIFICANT CHANGE UP (ref 3.8–10.5)
WBC # FLD AUTO: 8.6 K/UL — SIGNIFICANT CHANGE UP (ref 3.8–10.5)

## 2025-01-05 PROCEDURE — 99233 SBSQ HOSP IP/OBS HIGH 50: CPT

## 2025-01-05 RX ORDER — POTASSIUM CHLORIDE 600 MG/1
20 TABLET, FILM COATED, EXTENDED RELEASE ORAL ONCE
Refills: 0 | Status: COMPLETED | OUTPATIENT
Start: 2025-01-05 | End: 2025-01-05

## 2025-01-05 RX ADMIN — AMIODARONE HYDROCHLORIDE 100 MILLIGRAM(S): 200 TABLET ORAL at 05:32

## 2025-01-05 RX ADMIN — Medication 100 MILLIGRAM(S): at 14:25

## 2025-01-05 RX ADMIN — DABIGATRAN ETEXILATE 150 MILLIGRAM(S): 110 CAPSULE ORAL at 06:48

## 2025-01-05 RX ADMIN — FAMOTIDINE 20 MILLIGRAM(S): 20 TABLET, FILM COATED ORAL at 14:24

## 2025-01-05 RX ADMIN — TAMSULOSIN HYDROCHLORIDE 0.4 MILLIGRAM(S): 0.4 CAPSULE ORAL at 21:34

## 2025-01-05 RX ADMIN — ACETAMINOPHEN 1000 MILLIGRAM(S): 80 SOLUTION/ DROPS ORAL at 05:32

## 2025-01-05 RX ADMIN — Medication 1 TABLET(S): at 14:24

## 2025-01-05 RX ADMIN — Medication 81 MILLIGRAM(S): at 14:25

## 2025-01-05 RX ADMIN — Medication 2: at 22:22

## 2025-01-05 RX ADMIN — IPRATROPIUM BROMIDE AND ALBUTEROL SULFATE 3 MILLILITER(S): .5; 2.5 SOLUTION RESPIRATORY (INHALATION) at 05:32

## 2025-01-05 RX ADMIN — ACETAMINOPHEN 1000 MILLIGRAM(S): 80 SOLUTION/ DROPS ORAL at 14:25

## 2025-01-05 RX ADMIN — Medication 17 GRAM(S): at 17:41

## 2025-01-05 RX ADMIN — Medication 5 MILLIGRAM(S): at 05:32

## 2025-01-05 RX ADMIN — Medication 2: at 07:40

## 2025-01-05 RX ADMIN — IPRATROPIUM BROMIDE AND ALBUTEROL SULFATE 3 MILLILITER(S): .5; 2.5 SOLUTION RESPIRATORY (INHALATION) at 14:24

## 2025-01-05 RX ADMIN — Medication 2: at 12:35

## 2025-01-05 RX ADMIN — Medication 100 MILLIGRAM(S): at 05:32

## 2025-01-05 RX ADMIN — Medication 17 GRAM(S): at 05:32

## 2025-01-05 RX ADMIN — POTASSIUM CHLORIDE 20 MILLIEQUIVALENT(S): 600 TABLET, FILM COATED, EXTENDED RELEASE ORAL at 14:24

## 2025-01-05 RX ADMIN — Medication 25 MILLIGRAM(S): at 06:48

## 2025-01-05 RX ADMIN — POTASSIUM CHLORIDE 20 MILLIEQUIVALENT(S): 600 TABLET, FILM COATED, EXTENDED RELEASE ORAL at 07:00

## 2025-01-05 RX ADMIN — ACETAMINOPHEN 1000 MILLIGRAM(S): 80 SOLUTION/ DROPS ORAL at 21:34

## 2025-01-05 RX ADMIN — Medication 100 MILLIGRAM(S): at 21:34

## 2025-01-05 RX ADMIN — Medication 5 MILLIGRAM(S): at 22:22

## 2025-01-05 RX ADMIN — IPRATROPIUM BROMIDE AND ALBUTEROL SULFATE 3 MILLILITER(S): .5; 2.5 SOLUTION RESPIRATORY (INHALATION) at 21:09

## 2025-01-05 RX ADMIN — LISINOPRIL 20 MILLIGRAM(S): 30 TABLET ORAL at 06:48

## 2025-01-05 RX ADMIN — DABIGATRAN ETEXILATE 150 MILLIGRAM(S): 110 CAPSULE ORAL at 17:41

## 2025-01-05 NOTE — PROVIDER CONTACT NOTE (OTHER) - ASSESSMENT
Pt alert and oriented, VSS, controlled A-fib on monitor. Pt denies chest pain, palpitations. Pt has b/l tremors to UE, having difficulty holding things steady.

## 2025-01-05 NOTE — PROGRESS NOTE ADULT - SUBJECTIVE AND OBJECTIVE BOX
Ortho Note    endorses new b/l UE hand tremors that started yday   Denies CP, SOB, N/V, numbness/tingling     Vital Signs Last 24 Hrs  T(C): 36.7 (27 Dec 2024 05:00), Max: 36.8 (26 Dec 2024 10:00)  T(F): 98 (27 Dec 2024 05:00), Max: 98.2 (26 Dec 2024 10:00)  HR: 69 (27 Dec 2024 05:00) (67 - 95)  BP: 134/53 (27 Dec 2024 05:00) (125/60 - 140/78)  BP(mean): --  RR: 18 (27 Dec 2024 05:00) (16 - 19)  SpO2: 96% (27 Dec 2024 05:00) (96% - 100%)    Parameters below as of 27 Dec 2024 05:00  Patient On (Oxygen Delivery Method): room air      General: Pt Alert and oriented, NAD  LLE   Pulses: 2+ DP   Sensation: silt sural/saph/dpn/spn/tib   Motor: Quad/Ham/EHL/FHL/TA/GS 5/5 in strength                    84yM s/p Left YO by Dr. SATISH Echeverria on 12-17  - Weight Bearing Status: Protected Weight Bearing, no active abduction of left hip, no abduction pillow  - Pain control  - DVT PPx: Pradaxa, ASA  - F/u AM labs  -Pt progress:2 person assist   Dispo: out of pocket [facility] vs HPT    - FU med recs re hand tremors, fu mag/phos/LFTs as well     Ortho Pager [phone]

## 2025-01-05 NOTE — PROVIDER CONTACT NOTE (OTHER) - DATE AND TIME:
05-Jan-2025 06:00 5-Fu Counseling: 5-Fluorouracil Counseling:  I discussed with the patient the risks of 5-fluorouracil including but not limited to erythema, scaling, itching, weeping, crusting, and pain.

## 2025-01-05 NOTE — PROGRESS NOTE ADULT - ASSESSMENT
84/M with CAD, Atrial fibrillation on Pradaxa, CVA (?), DM type 2, HTN and osteoarthritis who presented with chronic left hip pain x 6 months that failed conservative management and admitted for an elective YO by Dr. Echeverria.    #Left hip osteoarthritis   #Post op state  - pain controlled on Tylenol 1g q8h and PRN Tramadol  - Provide adequate analgesia, Incentive spirometry, mobilize with fall precautions, bowel regimen, DVT prophylaxis  - PT/OT    #low flow-low gradient AS  #prior HFmrEF  Satting well on RA, HDS, no wheezing today, bilateral pitting to 1+ LE edema  CXR 12/29: Pulmonary vascular congestion. No pneumothorax. Moderate bilateral pleural effusions. The cardiomediastinal silhouette is suboptimally evaluated. No acute osseous abnormality  ProBNP: 3554  Echo 08/24: Left ventricular systolic function is grossly mildly decreased with an ejection fraction visually estimated at 45 to 50 %. Regional wall motion abnormalities present. Left atrium is moderately dilated. Mild to moderate aortic regurgitation. Moderate aortic stenosis  Stress test 10/24: Normal myocardial perfusion scan, with no evidence of infarction or inducible ischemia  - sp 20mg IV lasix 12/29, 1/1, PO lasix 40 12/30, PO lasix 20 1/3, volume exam relatively euvolemic today, deferring diuresis.  Given ptn low-flow low gradient aortic stenosis, will only consider diuresis very cautiously and evaluate volume status and need day by day.  Discussed w structural cardiology team.  - discussed w ptn and friend bedside need for daily weights and monitoring of leg edema to assess volume status, will potentially need low dose PRN PO lasix on discharge for when ptn becomes more hypervolemic, to be used w caution and w discussion w outpatient cardiologist  - repeat echo 12/29: EF 60-65%, low flow low gradient aortic stenosis, valve area 0.52  - Continue metoprolol 25, lisinopril 20  - Follows with cardiologist Dr AVELINO GALICIA, please ensure outpatient follow-up closely on discharge  - will need follow up w structural cardiology on discharge, please give patient information at discharge to the outpatient office,     #Leucocytosis, now resolved  - likely iso UTI given dysuria and UA w WBCs, pending ucx  - sp ctx 3d course  - monitor temp and WBC trend    #persistent dry cough, improving  - negative RVP 12/31, trial tessalon perles for symptoms  - sp SLP eval, sp FEES, ok to continue diet, speech to discuss w ENT re anatomy    #Constipation  - abdomen Xray no obstruction nor free air  - BM reported by the patient   - mobilize  - bowel regimen with Miralax BID and Senna once at night    #Acute blood loss anemia  - Monitor Hgb; no reported bleeding symptoms   - Transfuse for Hgb < 7  - Ensure Type and Screen available    #Urinary retention  - Lee catheter dced, condom cath uncomfortable, can trial using urinal instead  - ensure adequate bowel regimen in place    #CAD  #Atrial fibrillation  - rate controlled on Metoprolol, Amiodarone 100  - on dabigatran 150 q12    #HTN  - on Metoprolol, Amlodipine and Lisinopril  - ensure BP parameters in place    #DM type 2  - A1C 7, controlled  - on correctional insulin; goal glucose 100-180 mg/dL  - can resume Metformin on discharge  - DM diet    DVT ppx: Dabigatran

## 2025-01-05 NOTE — PROGRESS NOTE ADULT - SUBJECTIVE AND OBJECTIVE BOX
Patient is a 84y old  Male who presents with a chief complaint of left hip pain (05 Jan 2025 08:18)        SUBJECTIVE:  Patient was seen and examined at bedside, states no current upper extremity tremors, no actual dysuria but finds condom cath to not be working often, frustrating and uncomfortable.  States cough is improved, no SOB, WOB, F/C.    Overnight Events : NAEON    Last Bowel Movement: 04-Jan-2025 (01-05)  Last Bowel Movement: 04-Jan-2025 (01-04)  Last Bowel Movement: 04-Jan-2025 (01-04)  Last Bowel Movement: 03-Jan-2025 (01-03)  Last Bowel Movement: 31-Dec-2024 (01-02)  Last Bowel Movement: 31-Dec-2024 (01-02)  Last Bowel Movement: 31-Jan-2025 (01-01)  Last Bowel Movement: 31-Dec-2024 (01-01)  Last Bowel Movement: 31-Dec-2024 (12-31)  Last Bowel Movement: 30-Dec-2024 (12-31)  Last Bowel Movement: 30-Dec-2024 (12-30)  Last Bowel Movement: 28-Dec-2024 (12-30)  Last Bowel Movement: 28-Dec-2024 (12-29)      Review of systems: 12 point Review of systems negative unless otherwise documented elsewhere in note.     Diet, Consistent Carbohydrate/No Snacks:   Supplement Feeding Modality:  Oral  Ensure Max Cans or Servings Per Day:  1       Frequency:  Three Times a day (12-20-24 @ 13:26) [Active]      MEDICATIONS:  MEDICATIONS  (STANDING):  acetaminophen     Tablet .. 1000 milliGRAM(s) Oral every 8 hours  albuterol    90 MICROgram(s) HFA Inhaler 1 Puff(s) Inhalation once  albuterol/ipratropium for Nebulization 3 milliLiter(s) Nebulizer every 8 hours  aMIOdarone    Tablet 100 milliGRAM(s) Oral daily  amLODIPine   Tablet 5 milliGRAM(s) Oral daily  aspirin enteric coated 81 milliGRAM(s) Oral daily  benzonatate 100 milliGRAM(s) Oral three times a day  dabigatran 150 milliGRAM(s) Oral every 12 hours  dextrose 5%. 1000 milliLiter(s) (50 mL/Hr) IV Continuous <Continuous>  dextrose 5%. 1000 milliLiter(s) (100 mL/Hr) IV Continuous <Continuous>  dextrose 50% Injectable 25 Gram(s) IV Push once  dextrose 50% Injectable 12.5 Gram(s) IV Push once  dextrose 50% Injectable 25 Gram(s) IV Push once  famotidine    Tablet 20 milliGRAM(s) Oral daily  furosemide    Tablet 40 milliGRAM(s) Oral once  glucagon  Injectable 1 milliGRAM(s) IntraMuscular once  HYDROmorphone  Injectable 0.5 milliGRAM(s) IV Push once  insulin lispro (ADMELOG) corrective regimen sliding scale   SubCutaneous Before meals and at bedtime  lisinopril 20 milliGRAM(s) Oral daily  metoprolol succinate ER 25 milliGRAM(s) Oral daily  multivitamin 1 Tablet(s) Oral daily  polyethylene glycol 3350 17 Gram(s) Oral two times a day  potassium chloride    Tablet ER 20 milliEquivalent(s) Oral once  senna 2 Tablet(s) Oral at bedtime  tamsulosin 0.4 milliGRAM(s) Oral at bedtime    MEDICATIONS  (PRN):  albuterol    90 MICROgram(s) HFA Inhaler 1 Puff(s) Inhalation three times a day PRN Wheezing  benzocaine/menthol Lozenge 1 Lozenge Oral every 6 hours PRN Sore Throat  calcium carbonate    500 mG (Tums) Chewable 1 Tablet(s) Chew daily PRN Heartburn  dextrose Oral Gel 15 Gram(s) Oral once PRN Blood Glucose LESS THAN 70 milliGRAM(s)/deciliter  magnesium hydroxide Suspension 30 milliLiter(s) Oral daily PRN Constipation  melatonin 5 milliGRAM(s) Oral at bedtime PRN Sleep  ondansetron Injectable 4 milliGRAM(s) IV Push every 6 hours PRN Nausea and/or Vomiting      Allergies    statins (Hepatotoxicity)    Intolerances        OBJECTIVE:  Vital Signs Last 24 Hrs  T(C): 36.7 (05 Jan 2025 09:30), Max: 36.9 (04 Jan 2025 17:15)  T(F): 98 (05 Jan 2025 09:30), Max: 98.5 (04 Jan 2025 17:15)  HR: 85 (05 Jan 2025 09:30) (78 - 86)  BP: 110/62 (05 Jan 2025 09:30) (101/64 - 117/68)  BP(mean): --  RR: 18 (05 Jan 2025 09:30) (17 - 18)  SpO2: 96% (05 Jan 2025 09:30) (94% - 100%)    Parameters below as of 05 Jan 2025 09:30  Patient On (Oxygen Delivery Method): room air      I&O's Summary    04 Jan 2025 07:01  -  05 Jan 2025 07:00  --------------------------------------------------------  IN: 240 mL / OUT: 150 mL / NET: 90 mL    05 Jan 2025 07:01  -  05 Jan 2025 14:14  --------------------------------------------------------  IN: 0 mL / OUT: 200 mL / NET: -200 mL        PHYSICAL EXAM:  Gen: Resting in bed at time of exam, not in distress   CV: +S1/S2, systolic blowing murmur  Pulm: no wheezing , no crackles  no increase in work of breathing  Abd: soft, NTND  Skin: warm and dry, no new rashes   Ext: moving all 4 extremities spontaneously , trace edema  Neuro: AOx3, no gross focal neurological deficits  Psych: affect and behavior appropriate, pleasant at time of interview    LABS:                        8.4    8.60  )-----------( 754      ( 05 Jan 2025 05:30 )             28.1     01-05    142  |  108  |  26[H]  ----------------------------<  151[H]  3.6   |  23  |  0.67    Ca    8.4      05 Jan 2025 05:30          CAPILLARY BLOOD GLUCOSE      POCT Blood Glucose.: 181 mg/dL (05 Jan 2025 12:00)  POCT Blood Glucose.: 177 mg/dL (05 Jan 2025 07:30)  POCT Blood Glucose.: 97 mg/dL (04 Jan 2025 22:18)  POCT Blood Glucose.: 289 mg/dL (04 Jan 2025 17:20)    Urinalysis Basic - ( 05 Jan 2025 05:30 )    Color: x / Appearance: x / SG: x / pH: x  Gluc: 151 mg/dL / Ketone: x  / Bili: x / Urobili: x   Blood: x / Protein: x / Nitrite: x   Leuk Esterase: x / RBC: x / WBC x   Sq Epi: x / Non Sq Epi: x / Bacteria: x        MICRODATA:      RADIOLOGY/OTHER STUDIES:

## 2025-01-05 NOTE — PROVIDER CONTACT NOTE (OTHER) - ACTION/TREATMENT ORDERED:
Mitzi LOPEZ aware and states that he will discuss this change in rounds, no interventions at this time. Care ongoing

## 2025-01-06 ENCOUNTER — TRANSCRIPTION ENCOUNTER (OUTPATIENT)
Age: 85
End: 2025-01-06

## 2025-01-06 LAB
ANION GAP SERPL CALC-SCNC: 11 MMOL/L — SIGNIFICANT CHANGE UP (ref 5–17)
BUN SERPL-MCNC: 21 MG/DL — SIGNIFICANT CHANGE UP (ref 7–23)
CALCIUM SERPL-MCNC: 9.1 MG/DL — SIGNIFICANT CHANGE UP (ref 8.4–10.5)
CHLORIDE SERPL-SCNC: 110 MMOL/L — HIGH (ref 96–108)
CO2 SERPL-SCNC: 23 MMOL/L — SIGNIFICANT CHANGE UP (ref 22–31)
CREAT SERPL-MCNC: 0.67 MG/DL — SIGNIFICANT CHANGE UP (ref 0.5–1.3)
EGFR: 91 ML/MIN/1.73M2 — SIGNIFICANT CHANGE UP
GLUCOSE SERPL-MCNC: 109 MG/DL — HIGH (ref 70–99)
HCT VFR BLD CALC: 31.1 % — LOW (ref 39–50)
HGB BLD-MCNC: 8.9 G/DL — LOW (ref 13–17)
MCHC RBC-ENTMCNC: 26.9 PG — LOW (ref 27–34)
MCHC RBC-ENTMCNC: 28.6 G/DL — LOW (ref 32–36)
MCV RBC AUTO: 94 FL — SIGNIFICANT CHANGE UP (ref 80–100)
NRBC # BLD: 0 /100 WBCS — SIGNIFICANT CHANGE UP (ref 0–0)
PLATELET # BLD AUTO: 808 K/UL — HIGH (ref 150–400)
POTASSIUM SERPL-MCNC: 4.5 MMOL/L — SIGNIFICANT CHANGE UP (ref 3.5–5.3)
POTASSIUM SERPL-SCNC: 4.5 MMOL/L — SIGNIFICANT CHANGE UP (ref 3.5–5.3)
RBC # BLD: 3.31 M/UL — LOW (ref 4.2–5.8)
RBC # FLD: 16 % — HIGH (ref 10.3–14.5)
SODIUM SERPL-SCNC: 144 MMOL/L — SIGNIFICANT CHANGE UP (ref 135–145)
WBC # BLD: 8.98 K/UL — SIGNIFICANT CHANGE UP (ref 3.8–10.5)
WBC # FLD AUTO: 8.98 K/UL — SIGNIFICANT CHANGE UP (ref 3.8–10.5)

## 2025-01-06 PROCEDURE — 99233 SBSQ HOSP IP/OBS HIGH 50: CPT

## 2025-01-06 RX ADMIN — Medication 81 MILLIGRAM(S): at 12:35

## 2025-01-06 RX ADMIN — IPRATROPIUM BROMIDE AND ALBUTEROL SULFATE 3 MILLILITER(S): .5; 2.5 SOLUTION RESPIRATORY (INHALATION) at 06:13

## 2025-01-06 RX ADMIN — AMIODARONE HYDROCHLORIDE 100 MILLIGRAM(S): 200 TABLET ORAL at 05:06

## 2025-01-06 RX ADMIN — SENNOSIDES 2 TABLET(S): 8.6 TABLET, FILM COATED ORAL at 21:40

## 2025-01-06 RX ADMIN — ACETAMINOPHEN 1000 MILLIGRAM(S): 80 SOLUTION/ DROPS ORAL at 21:40

## 2025-01-06 RX ADMIN — DABIGATRAN ETEXILATE 150 MILLIGRAM(S): 110 CAPSULE ORAL at 17:35

## 2025-01-06 RX ADMIN — ACETAMINOPHEN 1000 MILLIGRAM(S): 80 SOLUTION/ DROPS ORAL at 05:06

## 2025-01-06 RX ADMIN — IPRATROPIUM BROMIDE AND ALBUTEROL SULFATE 3 MILLILITER(S): .5; 2.5 SOLUTION RESPIRATORY (INHALATION) at 14:18

## 2025-01-06 RX ADMIN — Medication 1 TABLET(S): at 12:35

## 2025-01-06 RX ADMIN — Medication 100 MILLIGRAM(S): at 05:06

## 2025-01-06 RX ADMIN — Medication 2: at 21:49

## 2025-01-06 RX ADMIN — LISINOPRIL 20 MILLIGRAM(S): 30 TABLET ORAL at 06:13

## 2025-01-06 RX ADMIN — FAMOTIDINE 20 MILLIGRAM(S): 20 TABLET, FILM COATED ORAL at 12:35

## 2025-01-06 RX ADMIN — DABIGATRAN ETEXILATE 150 MILLIGRAM(S): 110 CAPSULE ORAL at 06:13

## 2025-01-06 RX ADMIN — IPRATROPIUM BROMIDE AND ALBUTEROL SULFATE 3 MILLILITER(S): .5; 2.5 SOLUTION RESPIRATORY (INHALATION) at 21:40

## 2025-01-06 RX ADMIN — TAMSULOSIN HYDROCHLORIDE 0.4 MILLIGRAM(S): 0.4 CAPSULE ORAL at 21:40

## 2025-01-06 RX ADMIN — ACETAMINOPHEN 1000 MILLIGRAM(S): 80 SOLUTION/ DROPS ORAL at 14:17

## 2025-01-06 RX ADMIN — Medication 25 MILLIGRAM(S): at 06:13

## 2025-01-06 RX ADMIN — Medication 5 MILLIGRAM(S): at 05:06

## 2025-01-06 RX ADMIN — Medication 2: at 12:35

## 2025-01-06 RX ADMIN — Medication 100 MILLIGRAM(S): at 14:17

## 2025-01-06 RX ADMIN — Medication 100 MILLIGRAM(S): at 21:40

## 2025-01-06 NOTE — PROGRESS NOTE ADULT - SUBJECTIVE AND OBJECTIVE BOX
Patient is a 84y old  Male who presents with a chief complaint of left hip pain (06 Jan 2025 11:45)        SUBJECTIVE:  Patient was seen and examined at bedside, no acute complaints, denies tremor, dysuria, states urinal working out much better than condom catheter, denies SOB, CP, F/C.    Overnight Events : NAEON    Last Bowel Movement: 05-Jan-2025 (01-06)  Last Bowel Movement: 05-Jan-2025 (01-05)  Last Bowel Movement: 04-Jan-2025 (01-05)  Last Bowel Movement: 04-Jan-2025 (01-04)  Last Bowel Movement: 04-Jan-2025 (01-04)  Last Bowel Movement: 03-Jan-2025 (01-03)  Last Bowel Movement: 31-Dec-2024 (01-02)  Last Bowel Movement: 31-Dec-2024 (01-02)  Last Bowel Movement: 31-Jan-2025 (01-01)  Last Bowel Movement: 31-Dec-2024 (01-01)  Last Bowel Movement: 31-Dec-2024 (12-31)  Last Bowel Movement: 30-Dec-2024 (12-31)  Last Bowel Movement: 30-Dec-2024 (12-30)      Review of systems: 12 point Review of systems negative unless otherwise documented elsewhere in note.     Diet, Consistent Carbohydrate/No Snacks:   Supplement Feeding Modality:  Oral  Ensure Max Cans or Servings Per Day:  1       Frequency:  Three Times a day (12-20-24 @ 13:26) [Active]      MEDICATIONS:  MEDICATIONS  (STANDING):  acetaminophen     Tablet .. 1000 milliGRAM(s) Oral every 8 hours  albuterol    90 MICROgram(s) HFA Inhaler 1 Puff(s) Inhalation once  albuterol/ipratropium for Nebulization 3 milliLiter(s) Nebulizer every 8 hours  aMIOdarone    Tablet 100 milliGRAM(s) Oral daily  amLODIPine   Tablet 5 milliGRAM(s) Oral daily  aspirin enteric coated 81 milliGRAM(s) Oral daily  benzonatate 100 milliGRAM(s) Oral three times a day  dabigatran 150 milliGRAM(s) Oral every 12 hours  dextrose 5%. 1000 milliLiter(s) (50 mL/Hr) IV Continuous <Continuous>  dextrose 5%. 1000 milliLiter(s) (100 mL/Hr) IV Continuous <Continuous>  dextrose 50% Injectable 25 Gram(s) IV Push once  dextrose 50% Injectable 12.5 Gram(s) IV Push once  dextrose 50% Injectable 25 Gram(s) IV Push once  famotidine    Tablet 20 milliGRAM(s) Oral daily  furosemide    Tablet 40 milliGRAM(s) Oral once  glucagon  Injectable 1 milliGRAM(s) IntraMuscular once  HYDROmorphone  Injectable 0.5 milliGRAM(s) IV Push once  insulin lispro (ADMELOG) corrective regimen sliding scale   SubCutaneous Before meals and at bedtime  lisinopril 20 milliGRAM(s) Oral daily  metoprolol succinate ER 25 milliGRAM(s) Oral daily  multivitamin 1 Tablet(s) Oral daily  polyethylene glycol 3350 17 Gram(s) Oral two times a day  senna 2 Tablet(s) Oral at bedtime  tamsulosin 0.4 milliGRAM(s) Oral at bedtime    MEDICATIONS  (PRN):  albuterol    90 MICROgram(s) HFA Inhaler 1 Puff(s) Inhalation three times a day PRN Wheezing  benzocaine/menthol Lozenge 1 Lozenge Oral every 6 hours PRN Sore Throat  calcium carbonate    500 mG (Tums) Chewable 1 Tablet(s) Chew daily PRN Heartburn  dextrose Oral Gel 15 Gram(s) Oral once PRN Blood Glucose LESS THAN 70 milliGRAM(s)/deciliter  magnesium hydroxide Suspension 30 milliLiter(s) Oral daily PRN Constipation  melatonin 5 milliGRAM(s) Oral at bedtime PRN Sleep  ondansetron Injectable 4 milliGRAM(s) IV Push every 6 hours PRN Nausea and/or Vomiting      Allergies    statins (Hepatotoxicity)    Intolerances        OBJECTIVE:  Vital Signs Last 24 Hrs  T(C): 36.8 (06 Jan 2025 08:36), Max: 36.9 (05 Jan 2025 16:30)  T(F): 98.2 (06 Jan 2025 08:36), Max: 98.4 (05 Jan 2025 16:30)  HR: 86 (06 Jan 2025 08:36) (80 - 88)  BP: 109/57 (06 Jan 2025 08:36) (109/57 - 135/71)  BP(mean): --  RR: 18 (06 Jan 2025 08:36) (17 - 18)  SpO2: 99% (06 Jan 2025 08:36) (94% - 99%)    Parameters below as of 06 Jan 2025 08:36  Patient On (Oxygen Delivery Method): room air      I&O's Summary    05 Jan 2025 07:01  -  06 Jan 2025 07:00  --------------------------------------------------------  IN: 340 mL / OUT: 450 mL / NET: -110 mL    06 Jan 2025 07:01  -  06 Jan 2025 13:33  --------------------------------------------------------  IN: 240 mL / OUT: 0 mL / NET: 240 mL        PHYSICAL EXAM:  Gen: Resting in bed at time of exam, not in distress   CV: +S1/S2, systolic blowing murmur  Pulm: no wheezing , no crackles  no increase in work of breathing  Abd: soft, NTND  Skin: warm and dry, no new rashes   Ext: moving all 4 extremities spontaneously , no edema  ,  Neuro: AOx3, no gross focal neurological deficits  Psych: affect and behavior appropriate, pleasant at time of interview    LABS:                        8.9    8.98  )-----------( 808      ( 06 Jan 2025 05:30 )             31.1     01-06    144  |  110[H]  |  21  ----------------------------<  109[H]  4.5   |  23  |  0.67    Ca    9.1      06 Jan 2025 05:30          CAPILLARY BLOOD GLUCOSE      POCT Blood Glucose.: 166 mg/dL (06 Jan 2025 12:13)  POCT Blood Glucose.: 130 mg/dL (06 Jan 2025 07:42)  POCT Blood Glucose.: 178 mg/dL (05 Jan 2025 22:03)  POCT Blood Glucose.: 179 mg/dL (05 Jan 2025 17:43)    Urinalysis Basic - ( 06 Jan 2025 05:30 )    Color: x / Appearance: x / SG: x / pH: x  Gluc: 109 mg/dL / Ketone: x  / Bili: x / Urobili: x   Blood: x / Protein: x / Nitrite: x   Leuk Esterase: x / RBC: x / WBC x   Sq Epi: x / Non Sq Epi: x / Bacteria: x        MICRODATA:      RADIOLOGY/OTHER STUDIES:

## 2025-01-06 NOTE — PROGRESS NOTE ADULT - SUBJECTIVE AND OBJECTIVE BOX
Ortho Note    Subjective:  Pt comfortable without complaints, Axox4, able to make needs known, pain controlled with current pain medication regimen   Denies CP, SOB, N/V, numbness/tingling   Reviewed plan of care with patient,     Vital Signs Last 24 Hrs  T(C): 36.8 (01-06-25 @ 08:36), Max: 36.8 (01-06-25 @ 08:36)  T(F): 98.2 (01-06-25 @ 08:36), Max: 98.2 (01-06-25 @ 08:36)  HR: 86 (01-06-25 @ 08:36) (82 - 86)  BP: 109/57 (01-06-25 @ 08:36) (109/57 - 132/77)  BP(mean): --  RR: 18 (01-06-25 @ 08:36) (18 - 18)  SpO2: 99% (01-06-25 @ 08:36) (99% - 99%)  AVSS    Objective:    Physical Exam:  General: Pt Alert and oriented, NAD  LLE Prineo DSG C/D/I  Pulses: 2+ DP bilateral LE   Sensation: silt bilateral LE   Motor: Quad/Ham/EHL/FHL/TA/GS 5/5 in strength   +1 bilateral LE edema           84yM s/p Left YO by Dr. SATISH Echeverria on 12-17  - afebrile, wbcs 10.86  - Weight Bearing Status: WBAT with walker at all times, no active abduction of left hip, no abduction pillow  - Pain control- tylenol 1000mg PO Q8h, tramadol 50mg PO Q6th prn mild pain   - DVT PPx: Pradaxa 150mg PO Q12h, ASA 81mg PO daily   - appreciate medicine recs -  - Pt progress: 2 person max assist   - bowel regimen, IS use, PPI     Dispo: out of pocket [facility] vs HPT, pending patient progression versus  paying Out of pocket        Ortho Pager [phone]

## 2025-01-06 NOTE — DISCHARGE NOTE NURSING/CASE MANAGEMENT/SOCIAL WORK - PATIENT PORTAL LINK FT
You can access the FollowMyHealth Patient Portal offered by [facility] by registering at the following website: [email or web address]. By joining [facility]’s FollowMyHealth portal, you will also be able to view your health information using other applications (apps) compatible with our system.

## 2025-01-06 NOTE — PROGRESS NOTE ADULT - SUBJECTIVE AND OBJECTIVE BOX
Ortho Note    endorses new b/l UE hand tremors that started yday   Denies CP, SOB, N/V, numbness/tingling     Vital Signs Last 24 Hrs  T(C): 36.7 (27 Dec 2024 05:00), Max: 36.8 (26 Dec 2024 10:00)  T(F): 98 (27 Dec 2024 05:00), Max: 98.2 (26 Dec 2024 10:00)  HR: 69 (27 Dec 2024 05:00) (67 - 95)  BP: 134/53 (27 Dec 2024 05:00) (125/60 - 140/78)  BP(mean): --  RR: 18 (27 Dec 2024 05:00) (16 - 19)  SpO2: 96% (27 Dec 2024 05:00) (96% - 100%)    Parameters below as of 27 Dec 2024 05:00  Patient On (Oxygen Delivery Method): room air      General: Pt Alert and oriented, NAD  LLE   Pulses: 2+ DP   Sensation: silt sural/saph/dpn/spn/tib   Motor: Quad/Ham/EHL/FHL/TA/GS 5/5 in strength                    84yM s/p Left YO by Dr. SATISH Echeverria on 12-17  - Weight Bearing Status: Protected Weight Bearing, no active abduction of left hip, no abduction pillow  - Pain control  - DVT PPx: Pradaxa, ASA  - F/u AM labs  -Pt progress:2 person assist   Dispo: out of pocket [facility] vs HPT        Ortho Pager [phone]

## 2025-01-06 NOTE — PROGRESS NOTE ADULT - ASSESSMENT
84/M with CAD, Atrial fibrillation on Pradaxa, CVA (?), DM type 2, HTN and osteoarthritis who presented with chronic left hip pain x 6 months that failed conservative management and admitted for an elective YO by Dr. Echeverria.    #Left hip osteoarthritis   #Post op state  - pain controlled on Tylenol 1g q8h and PRN Tramadol  - Provide adequate analgesia, Incentive spirometry, mobilize with fall precautions, bowel regimen, DVT prophylaxis  - PT/OT    #low flow-low gradient AS  #prior HFmrEF  Satting well on RA, HDS, no wheezing today, bilateral pitting to 1+ LE edema  CXR 12/29: Pulmonary vascular congestion. No pneumothorax. Moderate bilateral pleural effusions. The cardiomediastinal silhouette is suboptimally evaluated. No acute osseous abnormality  ProBNP: 3554  Echo 08/24: Left ventricular systolic function is grossly mildly decreased with an ejection fraction visually estimated at 45 to 50 %. Regional wall motion abnormalities present. Left atrium is moderately dilated. Mild to moderate aortic regurgitation. Moderate aortic stenosis  Stress test 10/24: Normal myocardial perfusion scan, with no evidence of infarction or inducible ischemia  - sp 20mg IV lasix 12/29, 1/1, PO lasix 40 12/30, PO lasix 20 1/3, volume exam relatively euvolemic today, deferring diuresis.  Given ptn low-flow low gradient aortic stenosis, will only consider diuresis very cautiously and evaluate volume status and need day by day.  Discussed w structural cardiology team.  - discussed w ptn and friend bedside need for daily weights and monitoring of leg edema to assess volume status, will potentially need low dose PRN PO lasix on discharge for when ptn becomes more hypervolemic, to be used w caution and w discussion w outpatient cardiologist  - repeat echo 12/29: EF 60-65%, low flow low gradient aortic stenosis, valve area 0.52  - Continue metoprolol 25, lisinopril 20  - Follows with cardiologist Dr AVELINO GALICIA, please ensure outpatient follow-up closely on discharge  - will need follow up w structural cardiology on discharge, please give patient information at discharge to the outpatient office,     #Leucocytosis, now resolved  - likely iso UTI given dysuria and UA w WBCs, pending ucx  - sp ctx 3d course  - monitor temp and WBC trend    #persistent dry cough, improving  - negative RVP 12/31, trial tessalon perles for symptoms  - sp SLP eval, sp FEES, ok to continue diet, speech to discuss w ENT re anatomy    #Constipation  - abdomen Xray no obstruction nor free air  - BM reported by the patient   - mobilize  - bowel regimen with Miralax BID and Senna once at night    #Acute blood loss anemia  - Monitor Hgb; no reported bleeding symptoms   - Transfuse for Hgb < 7  - Ensure Type and Screen available    #Urinary retention  - Lee catheter dced, condom cath uncomfortable, can trial using urinal instead  - ensure adequate bowel regimen in place    #CAD  #Atrial fibrillation  - rate controlled on Metoprolol, Amiodarone 100  - on dabigatran 150 q12    #HTN  - on Metoprolol, Amlodipine and Lisinopril  - ensure BP parameters in place    #DM type 2  - A1C 7, controlled  - on correctional insulin; goal glucose 100-180 mg/dL  - can resume Metformin on discharge  - DM diet    DVT ppx: Dabigatran   84/M with CAD, Atrial fibrillation on Pradaxa, CVA (?), DM type 2, HTN and osteoarthritis who presented with chronic left hip pain x 6 months that failed conservative management and admitted for an elective YO by Dr. Echeverria.    #Left hip osteoarthritis   #Post op state  - pain controlled on Tylenol 1g q8h and PRN Tramadol  - Provide adequate analgesia, Incentive spirometry, mobilize with fall precautions, bowel regimen, DVT prophylaxis  - PT/OT    #low flow-low gradient AS  #prior HFmrEF, ptn w history of mild chronic systolic CHF however echo this hospitalization with normal EF  Satting well on RA, HDS, no wheezing today, bilateral pitting to 1+ LE edema  CXR 12/29: Pulmonary vascular congestion. No pneumothorax. Moderate bilateral pleural effusions. The cardiomediastinal silhouette is suboptimally evaluated. No acute osseous abnormality  ProBNP: 3554  Echo 08/24: Left ventricular systolic function is grossly mildly decreased with an ejection fraction visually estimated at 45 to 50 %. Regional wall motion abnormalities present. Left atrium is moderately dilated. Mild to moderate aortic regurgitation. Moderate aortic stenosis  Stress test 10/24: Normal myocardial perfusion scan, with no evidence of infarction or inducible ischemia  - sp 20mg IV lasix 12/29, 1/1, PO lasix 40 12/30, PO lasix 20 1/3, volume exam relatively euvolemic today, deferring diuresis.  Given ptn low-flow low gradient aortic stenosis, will only consider diuresis very cautiously and evaluate volume status and need day by day.  Discussed w structural cardiology team.  - discussed w ptn and friend bedside need for daily weights and monitoring of leg edema to assess volume status, will potentially need low dose PRN PO lasix on discharge for when ptn becomes more hypervolemic, to be used w caution and w discussion w outpatient cardiologist  - repeat echo 12/29: EF 60-65%, low flow low gradient aortic stenosis, valve area 0.52  - Continue metoprolol 25, lisinopril 20  - Follows with cardiologist Dr AVELINO GALICIA, please ensure outpatient follow-up closely on discharge  - will need follow up w structural cardiology on discharge, please give patient information at discharge to the outpatient office,     #Leucocytosis, now resolved  - likely iso UTI given dysuria and UA w WBCs, pending ucx  - sp ctx 3d course  - monitor temp and WBC trend    #persistent dry cough, improving  - negative RVP 12/31, trial tessalon perles for symptoms  - sp SLP eval, sp FEES, ok to continue diet, speech to discuss w ENT re anatomy    #Constipation  - abdomen Xray no obstruction nor free air  - BM reported by the patient   - mobilize  - bowel regimen with Miralax BID and Senna once at night    #Acute blood loss anemia  - Monitor Hgb; no reported bleeding symptoms   - Transfuse for Hgb < 7  - Ensure Type and Screen available    #Urinary retention  - Lee catheter dced, condom cath uncomfortable, can trial using urinal instead  - ensure adequate bowel regimen in place    #CAD  #Atrial fibrillation  - rate controlled on Metoprolol, Amiodarone 100  - on dabigatran 150 q12    #HTN  - on Metoprolol, Amlodipine and Lisinopril  - ensure BP parameters in place    #DM type 2  - A1C 7, controlled  - on correctional insulin; goal glucose 100-180 mg/dL  - can resume Metformin on discharge  - DM diet    DVT ppx: Dabigatran

## 2025-01-07 LAB
ADD ON TEST-SPECIMEN IN LAB: SIGNIFICANT CHANGE UP
ANION GAP SERPL CALC-SCNC: 10 MMOL/L — SIGNIFICANT CHANGE UP (ref 5–17)
BUN SERPL-MCNC: 22 MG/DL — SIGNIFICANT CHANGE UP (ref 7–23)
CALCIUM SERPL-MCNC: 8.6 MG/DL — SIGNIFICANT CHANGE UP (ref 8.4–10.5)
CHLORIDE SERPL-SCNC: 110 MMOL/L — HIGH (ref 96–108)
CO2 SERPL-SCNC: 22 MMOL/L — SIGNIFICANT CHANGE UP (ref 22–31)
CREAT SERPL-MCNC: 0.66 MG/DL — SIGNIFICANT CHANGE UP (ref 0.5–1.3)
EGFR: 91 ML/MIN/1.73M2 — SIGNIFICANT CHANGE UP
GLUCOSE SERPL-MCNC: 100 MG/DL — HIGH (ref 70–99)
HCT VFR BLD CALC: 29.8 % — LOW (ref 39–50)
HGB BLD-MCNC: 8.7 G/DL — LOW (ref 13–17)
MCHC RBC-ENTMCNC: 26.8 PG — LOW (ref 27–34)
MCHC RBC-ENTMCNC: 29.2 G/DL — LOW (ref 32–36)
MCV RBC AUTO: 91.7 FL — SIGNIFICANT CHANGE UP (ref 80–100)
NRBC # BLD: 0 /100 WBCS — SIGNIFICANT CHANGE UP (ref 0–0)
PLATELET # BLD AUTO: 746 K/UL — HIGH (ref 150–400)
POTASSIUM SERPL-MCNC: 4 MMOL/L — SIGNIFICANT CHANGE UP (ref 3.5–5.3)
POTASSIUM SERPL-SCNC: 4 MMOL/L — SIGNIFICANT CHANGE UP (ref 3.5–5.3)
RBC # BLD: 3.25 M/UL — LOW (ref 4.2–5.8)
RBC # FLD: 15.9 % — HIGH (ref 10.3–14.5)
SODIUM SERPL-SCNC: 142 MMOL/L — SIGNIFICANT CHANGE UP (ref 135–145)
WBC # BLD: 7.44 K/UL — SIGNIFICANT CHANGE UP (ref 3.8–10.5)
WBC # FLD AUTO: 7.44 K/UL — SIGNIFICANT CHANGE UP (ref 3.8–10.5)

## 2025-01-07 PROCEDURE — 99233 SBSQ HOSP IP/OBS HIGH 50: CPT

## 2025-01-07 PROCEDURE — 93010 ELECTROCARDIOGRAM REPORT: CPT

## 2025-01-07 RX ORDER — MAGNESIUM SULFATE 500 MG/ML
1 INJECTION, SOLUTION INTRAMUSCULAR; INTRAVENOUS ONCE
Refills: 0 | Status: COMPLETED | OUTPATIENT
Start: 2025-01-07 | End: 2025-01-07

## 2025-01-07 RX ORDER — MAGNESIUM SULFATE 500 MG/ML
1 INJECTION, SOLUTION INTRAMUSCULAR; INTRAVENOUS ONCE
Refills: 0 | Status: DISCONTINUED | OUTPATIENT
Start: 2025-01-07 | End: 2025-01-08

## 2025-01-07 RX ADMIN — Medication 5 MILLIGRAM(S): at 05:19

## 2025-01-07 RX ADMIN — Medication 17 GRAM(S): at 05:18

## 2025-01-07 RX ADMIN — SENNOSIDES 2 TABLET(S): 8.6 TABLET, FILM COATED ORAL at 21:55

## 2025-01-07 RX ADMIN — IPRATROPIUM BROMIDE AND ALBUTEROL SULFATE 3 MILLILITER(S): .5; 2.5 SOLUTION RESPIRATORY (INHALATION) at 14:47

## 2025-01-07 RX ADMIN — Medication 1 TABLET(S): at 12:23

## 2025-01-07 RX ADMIN — AMIODARONE HYDROCHLORIDE 100 MILLIGRAM(S): 200 TABLET ORAL at 05:19

## 2025-01-07 RX ADMIN — Medication 81 MILLIGRAM(S): at 12:23

## 2025-01-07 RX ADMIN — IPRATROPIUM BROMIDE AND ALBUTEROL SULFATE 3 MILLILITER(S): .5; 2.5 SOLUTION RESPIRATORY (INHALATION) at 21:54

## 2025-01-07 RX ADMIN — ACETAMINOPHEN 1000 MILLIGRAM(S): 80 SOLUTION/ DROPS ORAL at 21:55

## 2025-01-07 RX ADMIN — LISINOPRIL 20 MILLIGRAM(S): 30 TABLET ORAL at 06:49

## 2025-01-07 RX ADMIN — DABIGATRAN ETEXILATE 150 MILLIGRAM(S): 110 CAPSULE ORAL at 06:49

## 2025-01-07 RX ADMIN — IPRATROPIUM BROMIDE AND ALBUTEROL SULFATE 3 MILLILITER(S): .5; 2.5 SOLUTION RESPIRATORY (INHALATION) at 06:08

## 2025-01-07 RX ADMIN — Medication 25 MILLIGRAM(S): at 06:49

## 2025-01-07 RX ADMIN — Medication 100 MILLIGRAM(S): at 05:19

## 2025-01-07 RX ADMIN — Medication 100 MILLIGRAM(S): at 14:27

## 2025-01-07 RX ADMIN — ACETAMINOPHEN 1000 MILLIGRAM(S): 80 SOLUTION/ DROPS ORAL at 05:19

## 2025-01-07 RX ADMIN — Medication 100 MILLIGRAM(S): at 21:55

## 2025-01-07 RX ADMIN — Medication 2: at 21:54

## 2025-01-07 RX ADMIN — FAMOTIDINE 20 MILLIGRAM(S): 20 TABLET, FILM COATED ORAL at 12:23

## 2025-01-07 RX ADMIN — TAMSULOSIN HYDROCHLORIDE 0.4 MILLIGRAM(S): 0.4 CAPSULE ORAL at 21:55

## 2025-01-07 RX ADMIN — Medication 2: at 12:26

## 2025-01-07 RX ADMIN — Medication 2: at 17:57

## 2025-01-07 RX ADMIN — DABIGATRAN ETEXILATE 150 MILLIGRAM(S): 110 CAPSULE ORAL at 17:49

## 2025-01-07 RX ADMIN — ACETAMINOPHEN 1000 MILLIGRAM(S): 80 SOLUTION/ DROPS ORAL at 14:46

## 2025-01-07 RX ADMIN — MAGNESIUM SULFATE 100 GRAM(S): 500 INJECTION, SOLUTION INTRAMUSCULAR; INTRAVENOUS at 17:40

## 2025-01-07 NOTE — PROGRESS NOTE ADULT - ASSESSMENT
84/M with CAD, Atrial fibrillation on Pradaxa, CVA (?), DM type 2, HTN and osteoarthritis who presented with chronic left hip pain x 6 months that failed conservative management and admitted for an elective YO by Dr. Echeverria.    #Left hip osteoarthritis   #Post op state  - pain controlled on Tylenol 1g q8h and PRN Tramadol  - Provide adequate analgesia, Incentive spirometry, mobilize with fall precautions, bowel regimen, DVT prophylaxis  - PT/OT    #CAD  #Atrial fibrillation  - 40 beats NSVT on tele overnight, ptn asymptomatic  - check mag >2, phos, potassium, ensure wnl  - obtain EKG  - rate controlled on Metoprolol, Amiodarone 100  - on dabigatran 150 q12    #low flow-low gradient AS  #prior HFmrEF, ptn w history of mild chronic systolic CHF however echo this hospitalization with normal EF  Satting well on RA, HDS, no wheezing today, bilateral pitting to 1+ LE edema  CXR 12/29: Pulmonary vascular congestion. No pneumothorax. Moderate bilateral pleural effusions. The cardiomediastinal silhouette is suboptimally evaluated. No acute osseous abnormality  ProBNP: 3554  Echo 08/24: Left ventricular systolic function is grossly mildly decreased with an ejection fraction visually estimated at 45 to 50 %. Regional wall motion abnormalities present. Left atrium is moderately dilated. Mild to moderate aortic regurgitation. Moderate aortic stenosis  Stress test 10/24: Normal myocardial perfusion scan, with no evidence of infarction or inducible ischemia  - sp 20mg IV lasix 12/29, 1/1, PO lasix 40 12/30, PO lasix 20 1/3, volume exam relatively euvolemic today, deferring diuresis.  Given ptn low-flow low gradient aortic stenosis, will only consider diuresis very cautiously and evaluate volume status and need day by day.  Discussed w structural cardiology team.  - discussed w ptn and friend bedside need for daily weights and monitoring of leg edema to assess volume status, will potentially need low dose PRN PO lasix on discharge for when ptn becomes more hypervolemic, to be used w caution and w discussion w outpatient cardiologist  - repeat echo 12/29: EF 60-65%, low flow low gradient aortic stenosis, valve area 0.52  - Continue metoprolol 25, lisinopril 20  - Follows with cardiologist Dr AVELINO GALICIA, please ensure outpatient follow-up closely on discharge  - will need follow up w structural cardiology on discharge, please give patient information at discharge to the outpatient office,     #Leucocytosis, now resolved  - likely iso UTI given dysuria and UA w WBCs, pending ucx  - sp ctx 3d course  - monitor temp and WBC trend    #persistent dry cough, improving  - negative RVP 12/31, trial tessalon perles for symptoms  - sp SLP eval, sp FEES, ok to continue diet, speech to discuss w ENT re anatomy    #Constipation  - abdomen Xray no obstruction nor free air  - BM reported by the patient   - mobilize  - bowel regimen with Miralax BID and Senna once at night    #Acute blood loss anemia  - Monitor Hgb; no reported bleeding symptoms   - Transfuse for Hgb < 7  - Ensure Type and Screen available    #Urinary retention  - Lee catheter dced, condom cath uncomfortable, can trial using urinal instead  - ensure adequate bowel regimen in place    #HTN  - on Metoprolol, Amlodipine and Lisinopril  - ensure BP parameters in place    #DM type 2  - A1C 7, controlled  - on correctional insulin; goal glucose 100-180 mg/dL  - can resume Metformin on discharge  - DM diet    DVT ppx: Dabigatran

## 2025-01-07 NOTE — PROGRESS NOTE ADULT - SUBJECTIVE AND OBJECTIVE BOX
Ortho Note    Subjective:  Pt comfortable without complaints, pain controlled  Denies CP, SOB, N/V, numbness/tingling     Vital Signs Last 24 Hrs  T(C): 36.8 (01-07-25 @ 09:25), Max: 36.8 (01-07-25 @ 09:25)  T(F): 98.2 (01-07-25 @ 09:25), Max: 98.2 (01-07-25 @ 09:25)  HR: 82 (01-07-25 @ 09:25) (82 - 84)  BP: 127/68 (01-07-25 @ 09:25) (127/68 - 139/72)  BP(mean): --  RR: 18 (01-07-25 @ 09:25) (18 - 18)  SpO2: 98% (01-07-25 @ 09:25) (98% - 98%)  AVSS    Objective:    Physical Exam:  General: Pt Alert and oriented, NAD  LLE Prineo DSG C/D/I  Pulses: 2+ DP bilateral LE   Sensation: silt bilateral LE   Motor: Quad/Ham/EHL/FHL/TA/GS 5/5 in strength   +1 bilateral LE edema         84yM s/p Left YO by Dr. SATISH Ecehverria on 12-17  - afebrile, wbcs 10.86  - Weight Bearing Status: WBAT with walker at all times, no active abduction of left hip, no abduction pillow  - Pain control- tylenol 1000mg PO Q8h, tramadol 50mg PO Q6th prn mild pain   - DVT PPx: Pradaxa 150mg PO Q12h, ASA 81mg PO daily   - appreciate medicine recs -  - Pt progress: 2 person max assist - goal is to get patient oob to chair 1 person assist and then to dc home   - bowel regimen, IS use, PPI   - 40 beat run of vtach overnight, MG and phos ordered . MG 1.9-Magnesium IV 1 gram x2       Dispo: out of pocket [facility] vs HPT, pending patient progression versus  paying Out of pocket        Ortho Pager [phone]   Ortho Note    Subjective:  Pt comfortable without complaints, pain controlled with current pain medication regimen   Denies CP, SOB, N/V, numbness/tingling   Reviewed plan of care with patient at bedside    Vital Signs Last 24 Hrs  T(C): 36.8 (01-07-25 @ 09:25), Max: 36.8 (01-07-25 @ 09:25)  T(F): 98.2 (01-07-25 @ 09:25), Max: 98.2 (01-07-25 @ 09:25)  HR: 82 (01-07-25 @ 09:25) (82 - 84)  BP: 127/68 (01-07-25 @ 09:25) (127/68 - 139/72)  BP(mean): --  RR: 18 (01-07-25 @ 09:25) (18 - 18)  SpO2: 98% (01-07-25 @ 09:25) (98% - 98%)  AVSS    Objective:    Physical Exam:  General: Pt Alert and oriented, NAD  LLE Prineo DSG C/D/I  Pulses: 2+ DP bilateral LE   Sensation: silt bilateral LE   Motor: Quad/Ham/EHL/FHL/TA/GS 5/5 in strength   +1 bilateral LE edema         84yM s/p Left YO by Dr. SATISH Echeverria on 12-17  - afebrile, wbcs 10.86  - Weight Bearing Status: WBAT with walker at all times, no active abduction of left hip, no abduction pillow  - Pain control- tylenol 1000mg PO Q8h, tramadol 50mg PO Q6th prn mild pain   - DVT PPx: Pradaxa 150mg PO Q12h, ASA 81mg PO daily   - appreciate medicine recs -  - Pt progress: 2 person max assist - goal is to get patient oob to chair 1 person assist and then to dc home   - bowel regimen, IS use, PPI   - 40 beat run of vtach overnight, MG and phos ordered . MG 1.9-Magnesium IV 1 gram x2       Dispo: out of pocket [facility] vs HPT, pending patient progression versus  paying Out of pocket        Ortho Pager [phone]  Discussed plan of care with PT   patient is currently 2 person assist to stand , goal is 1 person assist oob to chair to dc home

## 2025-01-07 NOTE — PROGRESS NOTE ADULT - SUBJECTIVE AND OBJECTIVE BOX
Patient is a 84y old  Male who presents with a chief complaint of left hip pain (07 Jan 2025 12:34)        SUBJECTIVE:  Patient was seen and examined at bedside, denies CP, SOB, F/C, palpitations.    Overnight Events : 40 beats of NSVT on tele noted, ptn was not symptomatic.    Last Bowel Movement: 05-Jan-2025 (01-06)  Last Bowel Movement: 05-Jan-2025 (01-06)  Last Bowel Movement: 05-Jan-2025 (01-05)  Last Bowel Movement: 04-Jan-2025 (01-05)  Last Bowel Movement: 04-Jan-2025 (01-04)  Last Bowel Movement: 04-Jan-2025 (01-04)  Last Bowel Movement: 03-Jan-2025 (01-03)  Last Bowel Movement: 31-Dec-2024 (01-02)  Last Bowel Movement: 31-Dec-2024 (01-02)  Last Bowel Movement: 31-Jan-2025 (01-01)  Last Bowel Movement: 31-Dec-2024 (01-01)  Last Bowel Movement: 31-Dec-2024 (12-31)      Review of systems: 12 point Review of systems negative unless otherwise documented elsewhere in note.     Diet, Consistent Carbohydrate/No Snacks:   Supplement Feeding Modality:  Oral  Ensure Max Cans or Servings Per Day:  1       Frequency:  Three Times a day (12-20-24 @ 13:26) [Active]      MEDICATIONS:  MEDICATIONS  (STANDING):  acetaminophen     Tablet .. 1000 milliGRAM(s) Oral every 8 hours  albuterol    90 MICROgram(s) HFA Inhaler 1 Puff(s) Inhalation once  albuterol/ipratropium for Nebulization 3 milliLiter(s) Nebulizer every 8 hours  aMIOdarone    Tablet 100 milliGRAM(s) Oral daily  amLODIPine   Tablet 5 milliGRAM(s) Oral daily  aspirin enteric coated 81 milliGRAM(s) Oral daily  benzonatate 100 milliGRAM(s) Oral three times a day  dabigatran 150 milliGRAM(s) Oral every 12 hours  dextrose 5%. 1000 milliLiter(s) (100 mL/Hr) IV Continuous <Continuous>  dextrose 5%. 1000 milliLiter(s) (50 mL/Hr) IV Continuous <Continuous>  dextrose 50% Injectable 25 Gram(s) IV Push once  dextrose 50% Injectable 12.5 Gram(s) IV Push once  dextrose 50% Injectable 25 Gram(s) IV Push once  famotidine    Tablet 20 milliGRAM(s) Oral daily  furosemide    Tablet 40 milliGRAM(s) Oral once  glucagon  Injectable 1 milliGRAM(s) IntraMuscular once  HYDROmorphone  Injectable 0.5 milliGRAM(s) IV Push once  insulin lispro (ADMELOG) corrective regimen sliding scale   SubCutaneous Before meals and at bedtime  lisinopril 20 milliGRAM(s) Oral daily  magnesium sulfate  IVPB 1 Gram(s) IV Intermittent once  metoprolol succinate ER 25 milliGRAM(s) Oral daily  multivitamin 1 Tablet(s) Oral daily  polyethylene glycol 3350 17 Gram(s) Oral two times a day  senna 2 Tablet(s) Oral at bedtime  tamsulosin 0.4 milliGRAM(s) Oral at bedtime    MEDICATIONS  (PRN):  albuterol    90 MICROgram(s) HFA Inhaler 1 Puff(s) Inhalation three times a day PRN Wheezing  benzocaine/menthol Lozenge 1 Lozenge Oral every 6 hours PRN Sore Throat  calcium carbonate    500 mG (Tums) Chewable 1 Tablet(s) Chew daily PRN Heartburn  dextrose Oral Gel 15 Gram(s) Oral once PRN Blood Glucose LESS THAN 70 milliGRAM(s)/deciliter  magnesium hydroxide Suspension 30 milliLiter(s) Oral daily PRN Constipation  melatonin 5 milliGRAM(s) Oral at bedtime PRN Sleep  ondansetron Injectable 4 milliGRAM(s) IV Push every 6 hours PRN Nausea and/or Vomiting      Allergies    statins (Hepatotoxicity)    Intolerances        OBJECTIVE:  Vital Signs Last 24 Hrs  T(C): 36.8 (07 Jan 2025 09:25), Max: 36.9 (06 Jan 2025 14:20)  T(F): 98.2 (07 Jan 2025 09:25), Max: 98.5 (06 Jan 2025 14:20)  HR: 82 (07 Jan 2025 09:25) (72 - 84)  BP: 127/68 (07 Jan 2025 09:25) (109/64 - 144/77)  BP(mean): --  RR: 18 (07 Jan 2025 09:25) (16 - 18)  SpO2: 98% (07 Jan 2025 09:25) (94% - 99%)    Parameters below as of 07 Jan 2025 09:25  Patient On (Oxygen Delivery Method): room air      I&O's Summary    06 Jan 2025 07:01  -  07 Jan 2025 07:00  --------------------------------------------------------  IN: 360 mL / OUT: 250 mL / NET: 110 mL    07 Jan 2025 07:01  -  07 Jan 2025 13:16  --------------------------------------------------------  IN: 240 mL / OUT: 0 mL / NET: 240 mL        PHYSICAL EXAM:  Gen: Resting in bed at time of exam, not in distress   CV: RRR, +S1/S2  Pulm: no wheezing , no crackles  no increase in work of breathing  Abd: soft, NTND  Skin: warm and dry, no new rashes   Ext: moving all 4 extremities spontaneously , no edema today  Neuro: AOx3, no gross focal neurological deficits  Psych: affect and behavior appropriate, pleasant at time of interview    LABS:                        8.7    7.44  )-----------( 746      ( 07 Jan 2025 05:30 )             29.8     01-07    142  |  110[H]  |  22  ----------------------------<  100[H]  4.0   |  22  |  0.66    Ca    8.6      07 Jan 2025 05:30  Phos  3.3     01-07  Mg     1.9     01-07          CAPILLARY BLOOD GLUCOSE      POCT Blood Glucose.: 170 mg/dL (07 Jan 2025 11:35)  POCT Blood Glucose.: 106 mg/dL (07 Jan 2025 07:29)  POCT Blood Glucose.: 188 mg/dL (06 Jan 2025 21:44)  POCT Blood Glucose.: 134 mg/dL (06 Jan 2025 17:30)    Urinalysis Basic - ( 07 Jan 2025 05:30 )    Color: x / Appearance: x / SG: x / pH: x  Gluc: 100 mg/dL / Ketone: x  / Bili: x / Urobili: x   Blood: x / Protein: x / Nitrite: x   Leuk Esterase: x / RBC: x / WBC x   Sq Epi: x / Non Sq Epi: x / Bacteria: x        MICRODATA:      RADIOLOGY/OTHER STUDIES:             maximum assist (25% patients effort)

## 2025-01-07 NOTE — PROGRESS NOTE ADULT - SUBJECTIVE AND OBJECTIVE BOX
Ortho Note    no issues overnight, wants to go home as opposed to rehab   Denies CP, SOB, N/V, numbness/tingling     Vital Signs Last 24 Hrs  T(C): 36.7 (27 Dec 2024 05:00), Max: 36.8 (26 Dec 2024 10:00)  T(F): 98 (27 Dec 2024 05:00), Max: 98.2 (26 Dec 2024 10:00)  HR: 69 (27 Dec 2024 05:00) (67 - 95)  BP: 134/53 (27 Dec 2024 05:00) (125/60 - 140/78)  BP(mean): --  RR: 18 (27 Dec 2024 05:00) (16 - 19)  SpO2: 96% (27 Dec 2024 05:00) (96% - 100%)    Parameters below as of 27 Dec 2024 05:00  Patient On (Oxygen Delivery Method): room air      General: Pt Alert and oriented, NAD  LLE   Pulses: 2+ DP   Sensation: silt sural/saph/dpn/spn/tib   Motor: Quad/Ham/EHL/FHL/TA/GS 5/5 in strength   dsg overlying L hip changed, incision well healing apart from superficial dehiscence proximally, replaced w/ steris + new aquacell                    84yM s/p Left YO by Dr. SATISH Echeverria on 12-17  - Weight Bearing Status: Protected Weight Bearing, no active abduction of left hip, no abduction pillow  - Pain control  - DVT PPx: Pradaxa, ASA  - F/u AM labs  -Pt progress:2 person assist   Dispo: out of pocket [facility] vs HPT        Ortho Pager [phone]

## 2025-01-08 ENCOUNTER — NON-APPOINTMENT (OUTPATIENT)
Age: 85
End: 2025-01-08

## 2025-01-08 VITALS
SYSTOLIC BLOOD PRESSURE: 130 MMHG | RESPIRATION RATE: 18 BRPM | HEART RATE: 69 BPM | OXYGEN SATURATION: 99 % | TEMPERATURE: 98 F | DIASTOLIC BLOOD PRESSURE: 60 MMHG

## 2025-01-08 PROBLEM — M19.90 UNSPECIFIED OSTEOARTHRITIS, UNSPECIFIED SITE: Chronic | Status: ACTIVE | Noted: 2024-12-16

## 2025-01-08 LAB
ANION GAP SERPL CALC-SCNC: 10 MMOL/L — SIGNIFICANT CHANGE UP (ref 5–17)
BUN SERPL-MCNC: 20 MG/DL — SIGNIFICANT CHANGE UP (ref 7–23)
CALCIUM SERPL-MCNC: 8.6 MG/DL — SIGNIFICANT CHANGE UP (ref 8.4–10.5)
CHLORIDE SERPL-SCNC: 110 MMOL/L — HIGH (ref 96–108)
CO2 SERPL-SCNC: 22 MMOL/L — SIGNIFICANT CHANGE UP (ref 22–31)
CREAT SERPL-MCNC: 0.65 MG/DL — SIGNIFICANT CHANGE UP (ref 0.5–1.3)
EGFR: 92 ML/MIN/1.73M2 — SIGNIFICANT CHANGE UP
GLUCOSE SERPL-MCNC: 135 MG/DL — HIGH (ref 70–99)
HCT VFR BLD CALC: 30.4 % — LOW (ref 39–50)
HGB BLD-MCNC: 8.8 G/DL — LOW (ref 13–17)
MCHC RBC-ENTMCNC: 26.4 PG — LOW (ref 27–34)
MCHC RBC-ENTMCNC: 28.9 G/DL — LOW (ref 32–36)
MCV RBC AUTO: 91.3 FL — SIGNIFICANT CHANGE UP (ref 80–100)
NRBC # BLD: 0 /100 WBCS — SIGNIFICANT CHANGE UP (ref 0–0)
PLATELET # BLD AUTO: 825 K/UL — HIGH (ref 150–400)
POTASSIUM SERPL-MCNC: 4 MMOL/L — SIGNIFICANT CHANGE UP (ref 3.5–5.3)
POTASSIUM SERPL-SCNC: 4 MMOL/L — SIGNIFICANT CHANGE UP (ref 3.5–5.3)
RBC # BLD: 3.33 M/UL — LOW (ref 4.2–5.8)
RBC # FLD: 15.8 % — HIGH (ref 10.3–14.5)
SODIUM SERPL-SCNC: 142 MMOL/L — SIGNIFICANT CHANGE UP (ref 135–145)
WBC # BLD: 8.31 K/UL — SIGNIFICANT CHANGE UP (ref 3.8–10.5)
WBC # FLD AUTO: 8.31 K/UL — SIGNIFICANT CHANGE UP (ref 3.8–10.5)

## 2025-01-08 PROCEDURE — 92612 ENDOSCOPY SWALLOW (FEES) VID: CPT

## 2025-01-08 PROCEDURE — 94640 AIRWAY INHALATION TREATMENT: CPT

## 2025-01-08 PROCEDURE — 85610 PROTHROMBIN TIME: CPT

## 2025-01-08 PROCEDURE — 99233 SBSQ HOSP IP/OBS HIGH 50: CPT

## 2025-01-08 PROCEDURE — 74018 RADEX ABDOMEN 1 VIEW: CPT

## 2025-01-08 PROCEDURE — 0225U NFCT DS DNA&RNA 21 SARSCOV2: CPT

## 2025-01-08 PROCEDURE — 84100 ASSAY OF PHOSPHORUS: CPT

## 2025-01-08 PROCEDURE — 86901 BLOOD TYPING SEROLOGIC RH(D): CPT

## 2025-01-08 PROCEDURE — 72170 X-RAY EXAM OF PELVIS: CPT

## 2025-01-08 PROCEDURE — C1889: CPT

## 2025-01-08 PROCEDURE — 86900 BLOOD TYPING SEROLOGIC ABO: CPT

## 2025-01-08 PROCEDURE — 97110 THERAPEUTIC EXERCISES: CPT

## 2025-01-08 PROCEDURE — 88300 SURGICAL PATH GROSS: CPT

## 2025-01-08 PROCEDURE — 83880 ASSAY OF NATRIURETIC PEPTIDE: CPT

## 2025-01-08 PROCEDURE — 36415 COLL VENOUS BLD VENIPUNCTURE: CPT

## 2025-01-08 PROCEDURE — 97535 SELF CARE MNGMENT TRAINING: CPT

## 2025-01-08 PROCEDURE — 87186 SC STD MICRODIL/AGAR DIL: CPT

## 2025-01-08 PROCEDURE — C1776: CPT

## 2025-01-08 PROCEDURE — 86850 RBC ANTIBODY SCREEN: CPT

## 2025-01-08 PROCEDURE — C8929: CPT

## 2025-01-08 PROCEDURE — 93005 ELECTROCARDIOGRAM TRACING: CPT

## 2025-01-08 PROCEDURE — 93880 EXTRACRANIAL BILAT STUDY: CPT

## 2025-01-08 PROCEDURE — 83735 ASSAY OF MAGNESIUM: CPT

## 2025-01-08 PROCEDURE — 82962 GLUCOSE BLOOD TEST: CPT

## 2025-01-08 PROCEDURE — 85027 COMPLETE CBC AUTOMATED: CPT

## 2025-01-08 PROCEDURE — 97116 GAIT TRAINING THERAPY: CPT

## 2025-01-08 PROCEDURE — C9399: CPT

## 2025-01-08 PROCEDURE — 85730 THROMBOPLASTIN TIME PARTIAL: CPT

## 2025-01-08 PROCEDURE — 87086 URINE CULTURE/COLONY COUNT: CPT

## 2025-01-08 PROCEDURE — 81001 URINALYSIS AUTO W/SCOPE: CPT

## 2025-01-08 PROCEDURE — 92610 EVALUATE SWALLOWING FUNCTION: CPT

## 2025-01-08 PROCEDURE — 83036 HEMOGLOBIN GLYCOSYLATED A1C: CPT

## 2025-01-08 PROCEDURE — C1713: CPT

## 2025-01-08 PROCEDURE — 71045 X-RAY EXAM CHEST 1 VIEW: CPT

## 2025-01-08 PROCEDURE — 97165 OT EVAL LOW COMPLEX 30 MIN: CPT

## 2025-01-08 PROCEDURE — 97162 PT EVAL MOD COMPLEX 30 MIN: CPT

## 2025-01-08 PROCEDURE — 80053 COMPREHEN METABOLIC PANEL: CPT

## 2025-01-08 PROCEDURE — 80048 BASIC METABOLIC PNL TOTAL CA: CPT

## 2025-01-08 PROCEDURE — 97530 THERAPEUTIC ACTIVITIES: CPT

## 2025-01-08 RX ORDER — SENNOSIDES 8.6 MG/1
2 TABLET, FILM COATED ORAL
Qty: 60 | Refills: 0
Start: 2025-01-08 | End: 2025-02-06

## 2025-01-08 RX ORDER — ACETAMINOPHEN 80 MG/.8ML
2 SOLUTION/ DROPS ORAL
Qty: 84 | Refills: 0
Start: 2025-01-08 | End: 2025-01-21

## 2025-01-08 RX ORDER — POLYETHYLENE GLYCOL 3350 17 G/DOSE
17 POWDER (GRAM) ORAL
Qty: 1054 | Refills: 0
Start: 2025-01-08 | End: 2025-02-07

## 2025-01-08 RX ORDER — TAMSULOSIN HYDROCHLORIDE 0.4 MG/1
1 CAPSULE ORAL
Qty: 14 | Refills: 0
Start: 2025-01-08 | End: 2025-01-21

## 2025-01-08 RX ORDER — TAMSULOSIN HYDROCHLORIDE 0.4 MG/1
1 CAPSULE ORAL
Qty: 0 | Refills: 0 | DISCHARGE
Start: 2025-01-08

## 2025-01-08 RX ORDER — IPRATROPIUM BROMIDE AND ALBUTEROL SULFATE .5; 2.5 MG/3ML; MG/3ML
3 SOLUTION RESPIRATORY (INHALATION)
Qty: 0 | Refills: 0 | DISCHARGE
Start: 2025-01-08

## 2025-01-08 RX ORDER — METOPROLOL TARTRATE 50 MG
1 TABLET ORAL
Qty: 0 | Refills: 0 | DISCHARGE
Start: 2025-01-08

## 2025-01-08 RX ADMIN — Medication 25 MILLIGRAM(S): at 06:40

## 2025-01-08 RX ADMIN — AMIODARONE HYDROCHLORIDE 100 MILLIGRAM(S): 200 TABLET ORAL at 06:41

## 2025-01-08 RX ADMIN — IPRATROPIUM BROMIDE AND ALBUTEROL SULFATE 3 MILLILITER(S): .5; 2.5 SOLUTION RESPIRATORY (INHALATION) at 07:54

## 2025-01-08 RX ADMIN — LISINOPRIL 20 MILLIGRAM(S): 30 TABLET ORAL at 06:40

## 2025-01-08 RX ADMIN — Medication 17 GRAM(S): at 06:41

## 2025-01-08 RX ADMIN — Medication 100 MILLIGRAM(S): at 06:40

## 2025-01-08 RX ADMIN — Medication 30 MILLILITER(S): at 02:06

## 2025-01-08 RX ADMIN — ACETAMINOPHEN 1000 MILLIGRAM(S): 80 SOLUTION/ DROPS ORAL at 06:40

## 2025-01-08 RX ADMIN — Medication 5 MILLIGRAM(S): at 06:40

## 2025-01-08 RX ADMIN — DABIGATRAN ETEXILATE 150 MILLIGRAM(S): 110 CAPSULE ORAL at 06:41

## 2025-01-08 NOTE — PROGRESS NOTE ADULT - PROVIDER SPECIALTY LIST ADULT
Hospitalist
Orthopedics
Ethics
Hospitalist
Orthopedics
Hospitalist
Internal Medicine
Internal Medicine
Orthopedics
Hospitalist
Internal Medicine
Hospitalist

## 2025-01-08 NOTE — PROGRESS NOTE ADULT - SUBJECTIVE AND OBJECTIVE BOX
Patient is a 84y old  Male who presents with a chief complaint of left hip pain (08 Jan 2025 10:08)        SUBJECTIVE:  Patient was seen and examined at bedside, had large BM yesterday, no acute complaints.    Overnight Events : NAEON    Last Bowel Movement: 08-Jan-2025 (01-08)  Last Bowel Movement: 05-Jan-2025 (01-07)  Last Bowel Movement: 05-Jan-2025 (01-07)  Last Bowel Movement: 05-Jan-2025 (01-06)  Last Bowel Movement: 05-Jan-2025 (01-06)  Last Bowel Movement: 05-Jan-2025 (01-05)  Last Bowel Movement: 04-Jan-2025 (01-05)  Last Bowel Movement: 04-Jan-2025 (01-04)  Last Bowel Movement: 04-Jan-2025 (01-04)  Last Bowel Movement: 03-Jan-2025 (01-03)  Last Bowel Movement: 31-Dec-2024 (01-02)  Last Bowel Movement: 31-Dec-2024 (01-02)  Last Bowel Movement: 31-Jan-2025 (01-01)      Review of systems: 12 point Review of systems negative unless otherwise documented elsewhere in note.     Diet, Consistent Carbohydrate/No Snacks:   Supplement Feeding Modality:  Oral  Ensure Max Cans or Servings Per Day:  1       Frequency:  Three Times a day (12-20-24 @ 13:26) [Active]      MEDICATIONS:  MEDICATIONS  (STANDING):  acetaminophen     Tablet .. 1000 milliGRAM(s) Oral every 8 hours  albuterol    90 MICROgram(s) HFA Inhaler 1 Puff(s) Inhalation once  albuterol/ipratropium for Nebulization 3 milliLiter(s) Nebulizer every 8 hours  aMIOdarone    Tablet 100 milliGRAM(s) Oral daily  amLODIPine   Tablet 5 milliGRAM(s) Oral daily  aspirin enteric coated 81 milliGRAM(s) Oral daily  benzonatate 100 milliGRAM(s) Oral three times a day  dabigatran 150 milliGRAM(s) Oral every 12 hours  dextrose 5%. 1000 milliLiter(s) (100 mL/Hr) IV Continuous <Continuous>  dextrose 5%. 1000 milliLiter(s) (50 mL/Hr) IV Continuous <Continuous>  dextrose 50% Injectable 25 Gram(s) IV Push once  dextrose 50% Injectable 12.5 Gram(s) IV Push once  dextrose 50% Injectable 25 Gram(s) IV Push once  famotidine    Tablet 20 milliGRAM(s) Oral daily  furosemide    Tablet 40 milliGRAM(s) Oral once  glucagon  Injectable 1 milliGRAM(s) IntraMuscular once  HYDROmorphone  Injectable 0.5 milliGRAM(s) IV Push once  insulin lispro (ADMELOG) corrective regimen sliding scale   SubCutaneous Before meals and at bedtime  lisinopril 20 milliGRAM(s) Oral daily  magnesium sulfate  IVPB 1 Gram(s) IV Intermittent once  metoprolol succinate ER 25 milliGRAM(s) Oral daily  multivitamin 1 Tablet(s) Oral daily  polyethylene glycol 3350 17 Gram(s) Oral two times a day  senna 2 Tablet(s) Oral at bedtime  tamsulosin 0.4 milliGRAM(s) Oral at bedtime    MEDICATIONS  (PRN):  albuterol    90 MICROgram(s) HFA Inhaler 1 Puff(s) Inhalation three times a day PRN Wheezing  benzocaine/menthol Lozenge 1 Lozenge Oral every 6 hours PRN Sore Throat  calcium carbonate    500 mG (Tums) Chewable 1 Tablet(s) Chew daily PRN Heartburn  dextrose Oral Gel 15 Gram(s) Oral once PRN Blood Glucose LESS THAN 70 milliGRAM(s)/deciliter  magnesium hydroxide Suspension 30 milliLiter(s) Oral daily PRN Constipation  melatonin 5 milliGRAM(s) Oral at bedtime PRN Sleep  ondansetron Injectable 4 milliGRAM(s) IV Push every 6 hours PRN Nausea and/or Vomiting      Allergies    statins (Hepatotoxicity)    Intolerances        OBJECTIVE:  Vital Signs Last 24 Hrs  T(C): 36.8 (08 Jan 2025 08:59), Max: 36.9 (07 Jan 2025 20:50)  T(F): 98.3 (08 Jan 2025 08:59), Max: 98.5 (07 Jan 2025 20:50)  HR: 80 (08 Jan 2025 08:59) (74 - 80)  BP: 121/58 (08 Jan 2025 08:59) (121/58 - 135/67)  BP(mean): --  RR: 18 (08 Jan 2025 08:59) (18 - 20)  SpO2: 99% (08 Jan 2025 08:59) (96% - 99%)    Parameters below as of 08 Jan 2025 08:59  Patient On (Oxygen Delivery Method): room air      I&O's Summary    07 Jan 2025 07:01  -  08 Jan 2025 07:00  --------------------------------------------------------  IN: 240 mL / OUT: 800 mL / NET: -560 mL        PHYSICAL EXAM:  Gen: Resting in bed at time of exam, not in distress   CV: +S1/S2, blowing systolic murmur  Pulm: no wheezing , no crackles  no increase in work of breathing  Abd: soft, NTND  Skin: warm and dry, no new rashes   Ext: moving all 4 extremities spontaneously , no edema  ,  Neuro: AOx3, no gross focal neurological deficits  Psych: affect and behavior appropriate, pleasant at time of interview    LABS:                        8.8    8.31  )-----------( 825      ( 08 Jan 2025 05:30 )             30.4     01-08    142  |  110[H]  |  20  ----------------------------<  135[H]  4.0   |  22  |  0.65    Ca    8.6      08 Jan 2025 05:30  Phos  3.3     01-07  Mg     1.9     01-07          CAPILLARY BLOOD GLUCOSE      POCT Blood Glucose.: 120 mg/dL (08 Jan 2025 07:21)  POCT Blood Glucose.: 124 mg/dL (08 Jan 2025 07:06)  POCT Blood Glucose.: 151 mg/dL (07 Jan 2025 21:48)  POCT Blood Glucose.: 158 mg/dL (07 Jan 2025 17:34)  POCT Blood Glucose.: 170 mg/dL (07 Jan 2025 11:35)    Urinalysis Basic - ( 08 Jan 2025 05:30 )    Color: x / Appearance: x / SG: x / pH: x  Gluc: 135 mg/dL / Ketone: x  / Bili: x / Urobili: x   Blood: x / Protein: x / Nitrite: x   Leuk Esterase: x / RBC: x / WBC x   Sq Epi: x / Non Sq Epi: x / Bacteria: x        MICRODATA:      RADIOLOGY/OTHER STUDIES:

## 2025-01-08 NOTE — PROGRESS NOTE ADULT - SUBJECTIVE AND OBJECTIVE BOX
POST OPERATIVE DAY #:   STATUS POST: Left    Right  TKA/THR                        SUBJECTIVE: Patient seen and examined.   Denies any sob/cp/n/v/numbness or tingling in b/l     OBJECTIVE:     Vital Signs Last 24 Hrs  T(C): 36.8 (08 Jan 2025 06:25), Max: 36.9 (07 Jan 2025 20:50)  T(F): 98.2 (08 Jan 2025 06:25), Max: 98.5 (07 Jan 2025 20:50)  HR: 77 (08 Jan 2025 06:25) (74 - 82)  BP: 133/67 (08 Jan 2025 06:25) (127/68 - 135/67)  BP(mean): --  RR: 20 (08 Jan 2025 06:25) (18 - 20)  SpO2: 98% (08 Jan 2025 06:25) (96% - 98%)    Parameters below as of 08 Jan 2025 06:25  Patient On (Oxygen Delivery Method): room air        General:  Affected extremity:   Dressing: clean/dry/intact   Sensation: intact to light touch to patient's baseline  Motor exam: EHL/TA/GS    Pulses             I&O's Detail    07 Jan 2025 07:01  -  08 Jan 2025 07:00  --------------------------------------------------------  IN:    Oral Fluid: 240 mL  Total IN: 240 mL    OUT:    Voided (mL): 800 mL  Total OUT: 800 mL    Total NET: -560 mL          LABS:                        8.8    8.31  )-----------( 825      ( 08 Jan 2025 05:30 )             30.4     01-08    142  |  110[H]  |  20  ----------------------------<  135[H]  4.0   |  22  |  0.65    Ca    8.6      08 Jan 2025 05:30  Phos  3.3     01-07  Mg     1.9     01-07        Urinalysis Basic - ( 08 Jan 2025 05:30 )    Color: x / Appearance: x / SG: x / pH: x  Gluc: 135 mg/dL / Ketone: x  / Bili: x / Urobili: x   Blood: x / Protein: x / Nitrite: x   Leuk Esterase: x / RBC: x / WBC x   Sq Epi: x / Non Sq Epi: x / Bacteria: x        MEDICATIONS:    acetaminophen     Tablet .. 1000 milliGRAM(s) Oral every 8 hours  HYDROmorphone  Injectable 0.5 milliGRAM(s) IV Push once  melatonin 5 milliGRAM(s) Oral at bedtime PRN  ondansetron Injectable 4 milliGRAM(s) IV Push every 6 hours PRN    aspirin enteric coated 81 milliGRAM(s) Oral daily  dabigatran 150 milliGRAM(s) Oral every 12 hours        ASSESSMENT AND PLAN: 85yo Male s/p     1. Analgesic pain control  2. DVT prophylaxis: ASA      HSQ       Coumadin      Lovenox      SCDs      Other:   3. Weight Bearing Status:  Weight bearing as tolerated       NWB         Partial Weight Bearing  4. Disposition: Home          Subacute Rehab      pending PT evaluation   STATUS POST: Left  THR                        SUBJECTIVE: Patient seen and examined today with aid at bedside. Pt. states he is doing well, has no pain in his hip. Pt. aid was concerned about irritation on penis due to urinal being place. Pt. denies any pain in penis region. Pt. states he is going home today.   Denies any sob/cp/n/v/numbness or tingling in b/l les.     OBJECTIVE:     Vital Signs Last 24 Hrs  T(C): 36.8 (08 Jan 2025 06:25), Max: 36.9 (07 Jan 2025 20:50)  T(F): 98.2 (08 Jan 2025 06:25), Max: 98.5 (07 Jan 2025 20:50)  HR: 77 (08 Jan 2025 06:25) (74 - 82)  BP: 133/67 (08 Jan 2025 06:25) (127/68 - 135/67)  BP(mean): --  RR: 20 (08 Jan 2025 06:25) (18 - 20)  SpO2: 98% (08 Jan 2025 06:25) (96% - 98%)    Parameters below as of 08 Jan 2025 06:25  Patient On (Oxygen Delivery Method): room air        General: NAD, sitting in bed  Affected extremity: left le skin intact, no erythema/ecchymosis/sts  Dressing: clean/dry/intact aquacel in place   Sensation: intact to light touch to patient's baseline  Motor exam: EHL/TA/GS  5/5  Pulses 2+             I&O's Detail    07 Jan 2025 07:01  -  08 Jan 2025 07:00  --------------------------------------------------------  IN:    Oral Fluid: 240 mL  Total IN: 240 mL    OUT:    Voided (mL): 800 mL  Total OUT: 800 mL    Total NET: -560 mL          LABS:                        8.8    8.31  )-----------( 825      ( 08 Jan 2025 05:30 )             30.4     01-08    142  |  110[H]  |  20  ----------------------------<  135[H]  4.0   |  22  |  0.65    Ca    8.6      08 Jan 2025 05:30  Phos  3.3     01-07  Mg     1.9     01-07        Urinalysis Basic - ( 08 Jan 2025 05:30 )    Color: x / Appearance: x / SG: x / pH: x  Gluc: 135 mg/dL / Ketone: x  / Bili: x / Urobili: x   Blood: x / Protein: x / Nitrite: x   Leuk Esterase: x / RBC: x / WBC x   Sq Epi: x / Non Sq Epi: x / Bacteria: x        MEDICATIONS:    acetaminophen     Tablet .. 1000 milliGRAM(s) Oral every 8 hours  HYDROmorphone  Injectable 0.5 milliGRAM(s) IV Push once  melatonin 5 milliGRAM(s) Oral at bedtime PRN  ondansetron Injectable 4 milliGRAM(s) IV Push every 6 hours PRN    aspirin enteric coated 81 milliGRAM(s) Oral daily  dabigatran 150 milliGRAM(s) Oral every 12 hours        ASSESSMENT AND PLAN: 83yo Male s/p LEFT THR     1. Analgesic pain control  2. DVT prophylaxis: ASA       SCDs        3. Weight Bearing Status:  Weight bearing as tolerated      4. Disposition: Home today  5. Afib- Continue home medication. Pt. will need follow up with cardio and Structural heart 2/2 to AS (per aide appointments made today).   5. Htn- Continue home medication    STATUS POST: Left  THR                        SUBJECTIVE: Patient seen and examined today with aid at bedside. Pt. states he is doing well, has no pain in his hip. Pt. aid was concerned about irritation on penis due to urinal being place. Pt. denies any pain in penis region. Pt. states he is going home today.   Denies any sob/cp/n/v/numbness or tingling in b/l les.     OBJECTIVE:     Vital Signs Last 24 Hrs  T(C): 36.8 (08 Jan 2025 06:25), Max: 36.9 (07 Jan 2025 20:50)  T(F): 98.2 (08 Jan 2025 06:25), Max: 98.5 (07 Jan 2025 20:50)  HR: 77 (08 Jan 2025 06:25) (74 - 82)  BP: 133/67 (08 Jan 2025 06:25) (127/68 - 135/67)  BP(mean): --  RR: 20 (08 Jan 2025 06:25) (18 - 20)  SpO2: 98% (08 Jan 2025 06:25) (96% - 98%)    Parameters below as of 08 Jan 2025 06:25  Patient On (Oxygen Delivery Method): room air        General: NAD, sitting in bed  Affected extremity: left le skin intact, no erythema/ecchymosis/sts  Dressing: clean/dry/intact aquacel in place   Sensation: intact to light touch to patient's baseline  Motor exam: EHL/TA/GS  5/5  Pulses 2+  - urinal in place, no erythema/ecchymosis/abrasions on penis             I&O's Detail    07 Jan 2025 07:01  -  08 Jan 2025 07:00  --------------------------------------------------------  IN:    Oral Fluid: 240 mL  Total IN: 240 mL    OUT:    Voided (mL): 800 mL  Total OUT: 800 mL    Total NET: -560 mL          LABS:                        8.8    8.31  )-----------( 825      ( 08 Jan 2025 05:30 )             30.4     01-08    142  |  110[H]  |  20  ----------------------------<  135[H]  4.0   |  22  |  0.65    Ca    8.6      08 Jan 2025 05:30  Phos  3.3     01-07  Mg     1.9     01-07        Urinalysis Basic - ( 08 Jan 2025 05:30 )    Color: x / Appearance: x / SG: x / pH: x  Gluc: 135 mg/dL / Ketone: x  / Bili: x / Urobili: x   Blood: x / Protein: x / Nitrite: x   Leuk Esterase: x / RBC: x / WBC x   Sq Epi: x / Non Sq Epi: x / Bacteria: x        MEDICATIONS:    acetaminophen     Tablet .. 1000 milliGRAM(s) Oral every 8 hours  HYDROmorphone  Injectable 0.5 milliGRAM(s) IV Push once  melatonin 5 milliGRAM(s) Oral at bedtime PRN  ondansetron Injectable 4 milliGRAM(s) IV Push every 6 hours PRN    aspirin enteric coated 81 milliGRAM(s) Oral daily  dabigatran 150 milliGRAM(s) Oral every 12 hours        ASSESSMENT AND PLAN: 85yo Male s/p LEFT THR     1. Analgesic pain control  2. DVT prophylaxis: ASA       SCDs        3. Weight Bearing Status:  Weight bearing as tolerated      4. Disposition: Home today  5. Afib- Continue home medication. Pt. will need follow up with cardio and Structural heart 2/2 to AS (per aide appointments made today).   5. Htn- Continue home medication   Medically stable for dc home, pickup for 12pm today. D/w Dr. Napier.

## 2025-01-08 NOTE — PROGRESS NOTE ADULT - TIME BILLING
Bedside exam and interview   Reviewed vitals, labs   Discussed patient's plan of care with primary  Documentation of encounter  Excludes teaching and separately reported services
Review of hospital course, labs, vitals, medical records.   Bedside exam and interview   Discussed plan of care with primary team ACP and housestaff   Documenting the encounter  Excludes teaching time and/or separately reported services
Bedside exam and interview   Reviewed vitals, labs   Discussed patient's plan of care with housestaff   Documentation of encounter
Bedside exam and interview   Reviewed vitals, labs   Discussed patient's plan of care with housestaff   Documentation of encounter
Bedside exam and interview   Reviewed vitals, labs   Discussed patient's plan of care with primary  Documentation of encounter  Excludes teaching and separately reported services
Bedside exam and interview   Reviewed vitals, labs   Discussed patient's plan of care with primary  Documentation of encounter  Excludes teaching and separately reported services
Review of hospital course, labs, vitals, medical records.   Bedside exam and interview   Discussed plan of care with primary team ACP and housestaff   Documenting the encounter
Review of hospital course, labs, vitals, medical records.   Bedside exam and interview   Discussed plan of care with primary team ACP and housestaff   Documenting the encounter  Excludes teaching time and/or separately reported services
Bedside exam and interview   Reviewed vitals, labs   Discussed patient's plan of care with housestaff   Documentation of encounter
Bedside exam and interview   Reviewed vitals, labs   Discussed patient's plan of care with primary  Documentation of encounter  Excludes teaching and separately reported services
Review of hospital course, labs, vitals, medical records.   Bedside exam and interview   Discussed plan of care with primary team ACP and housestaff   Documenting the encounter  Excludes teaching time and/or separately reported services
Bedside exam and interview   Reviewed vitals, labs   Discussed patient's plan of care with housestaff   Documentation of encounter
Bedside exam and interview   Reviewed vitals, labs   Discussed patient's plan of care with primary  Documentation of encounter  Excludes teaching and separately reported services
Review of hospital course, labs, vitals, medical records.   Bedside exam and interview   Discussed plan of care with primary team ACP and housestaff   Documenting the encounter

## 2025-01-08 NOTE — PROGRESS NOTE ADULT - REASON FOR ADMISSION
left hip pain

## 2025-01-08 NOTE — PROGRESS NOTE ADULT - ASSESSMENT
84/M with CAD, Atrial fibrillation on Pradaxa, CVA (?), DM type 2, HTN and osteoarthritis who presented with chronic left hip pain x 6 months that failed conservative management and admitted for an elective YO by Dr. Echeverria.    #Left hip osteoarthritis   #Post op state  - pain controlled on Tylenol 1g q8h and PRN Tramadol  - Provide adequate analgesia, Incentive spirometry, mobilize with fall precautions, bowel regimen, DVT prophylaxis  - PT/OT    #CAD  #Atrial fibrillation  - 40 beats NSVT on tele 1/8, ptn asymptomatic, subsequent EKG w known afib  - ensure mag >2, phos, potassium wnl  - rate controlled on Metoprolol, Amiodarone 100  - on dabigatran 150 q12    #low flow-low gradient AS  #prior HFmrEF, ptn w history of mild chronic systolic CHF however echo this hospitalization with normal EF  Satting well on RA, HDS, no wheezing today, bilateral pitting to 1+ LE edema  CXR 12/29: Pulmonary vascular congestion. No pneumothorax. Moderate bilateral pleural effusions. The cardiomediastinal silhouette is suboptimally evaluated. No acute osseous abnormality  ProBNP: 3554  Echo 08/24: Left ventricular systolic function is grossly mildly decreased with an ejection fraction visually estimated at 45 to 50 %. Regional wall motion abnormalities present. Left atrium is moderately dilated. Mild to moderate aortic regurgitation. Moderate aortic stenosis  Stress test 10/24: Normal myocardial perfusion scan, with no evidence of infarction or inducible ischemia  - sp 20mg IV lasix 12/29, 1/1, PO lasix 40 12/30, PO lasix 20 1/3, volume exam relatively euvolemic today, deferring diuresis.  Given ptn low-flow low gradient aortic stenosis, will only consider diuresis very cautiously and evaluate volume status and need day by day.  Discussed w structural cardiology team.  - discussed w ptn and friend bedside need for daily weights and monitoring of leg edema to assess volume status, given ptn euvolemic volume status last few days on exam and improved EF on recent echo, will defer on prescribing diuresis on discharge given known low-flow low gradient AS, reinforced need for close follow up w cardiologist to ptn and ptn caregiver bedside  - repeat echo 12/29: EF 60-65%, low flow low gradient aortic stenosis, valve area 0.52  - Continue metoprolol 25, lisinopril 20  - Follows with cardiologist Dr AVELINO GALICIA, please ensure outpatient follow-up closely on discharge  - will need follow up w structural cardiology on discharge, please give patient information at discharge to the outpatient office,     #Leucocytosis, now resolved  - likely iso UTI given dysuria and UA w WBCs, pending ucx  - sp ctx 3d course  - monitor temp and WBC trend    #persistent dry cough, improving  - negative RVP 12/31, trial tessalon perles for symptoms  - sp SLP eval, sp FEES, ok to continue diet, speech to discuss w ENT re anatomy    #Constipation  - abdomen Xray no obstruction nor free air  - BM reported by the patient   - mobilize  - discharge w bowel regimen: Miralax BID and Senna, colace    #Acute blood loss anemia  - Monitor Hgb; no reported bleeding symptoms   - Transfuse for Hgb < 7  - Ensure Type and Screen available    #Urinary retention  - Lee catheter dced, condom cath uncomfortable, can trial using urinal instead  - ensure adequate bowel regimen in place    #HTN  - on Metoprolol, Amlodipine and Lisinopril  - ensure BP parameters in place    #DM type 2  - A1C 7, controlled  - on correctional insulin; goal glucose 100-180 mg/dL  - can resume Metformin on discharge  - DM diet    DVT ppx: Dabigatran

## 2025-01-08 NOTE — PROGRESS NOTE ADULT - SUBJECTIVE AND OBJECTIVE BOX
Ortho Note    no issues overnight,   Denies CP, SOB, N/V, numbness/tingling     Vital Signs Last 24 Hrs  T(C): 36.7 (27 Dec 2024 05:00), Max: 36.8 (26 Dec 2024 10:00)  T(F): 98 (27 Dec 2024 05:00), Max: 98.2 (26 Dec 2024 10:00)  HR: 69 (27 Dec 2024 05:00) (67 - 95)  BP: 134/53 (27 Dec 2024 05:00) (125/60 - 140/78)  BP(mean): --  RR: 18 (27 Dec 2024 05:00) (16 - 19)  SpO2: 96% (27 Dec 2024 05:00) (96% - 100%)    Parameters below as of 27 Dec 2024 05:00  Patient On (Oxygen Delivery Method): room air      General: Pt Alert and oriented, NAD  LLE   Pulses: 2+ DP   Sensation: silt sural/saph/dpn/spn/tib   Motor: Quad/Ham/EHL/FHL/TA/GS 5/5 in strength   L hip dsg c/d/i                  84yM s/p Left YO by Dr. SATISH Echeverria on 12-17  - Weight Bearing Status: Protected Weight Bearing, no active abduction of left hip, no abduction pillow  - Pain control  - DVT PPx: Pradaxa, ASA  - F/u AM labs  -Pt progress:2 person assist   Dispo: HPT when able to to be OOB to chair w/ 1 person asssist       Ortho Pager [phone]

## 2025-01-10 DIAGNOSIS — Z96.642 PRESENCE OF LEFT ARTIFICIAL HIP JOINT: ICD-10-CM

## 2025-01-10 RX ORDER — CEPHALEXIN 500 MG/1
500 CAPSULE ORAL 3 TIMES DAILY
Qty: 21 | Refills: 0 | Status: ACTIVE | COMMUNITY
Start: 2025-01-10 | End: 1900-01-01

## 2025-01-13 ENCOUNTER — APPOINTMENT (OUTPATIENT)
Dept: ORTHOPEDIC SURGERY | Facility: CLINIC | Age: 85
End: 2025-01-13

## 2025-01-14 ENCOUNTER — NON-APPOINTMENT (OUTPATIENT)
Age: 85
End: 2025-01-14

## 2025-01-14 ENCOUNTER — APPOINTMENT (OUTPATIENT)
Dept: CARDIOLOGY | Facility: CLINIC | Age: 85
End: 2025-01-14
Payer: MEDICARE

## 2025-01-14 VITALS
HEART RATE: 134 BPM | HEIGHT: 68 IN | WEIGHT: 140 LBS | OXYGEN SATURATION: 79 % | DIASTOLIC BLOOD PRESSURE: 62 MMHG | BODY MASS INDEX: 21.22 KG/M2 | SYSTOLIC BLOOD PRESSURE: 102 MMHG

## 2025-01-14 DIAGNOSIS — I35.0 NONRHEUMATIC AORTIC (VALVE) STENOSIS: ICD-10-CM

## 2025-01-14 DIAGNOSIS — I48.21 PERMANENT ATRIAL FIBRILLATION: ICD-10-CM

## 2025-01-14 DIAGNOSIS — I25.10 ATHEROSCLEROTIC HEART DISEASE OF NATIVE CORONARY ARTERY W/OUT ANGINA PECTORIS: ICD-10-CM

## 2025-01-14 DIAGNOSIS — I42.9 CARDIOMYOPATHY, UNSPECIFIED: ICD-10-CM

## 2025-01-14 PROCEDURE — G2211 COMPLEX E/M VISIT ADD ON: CPT

## 2025-01-14 PROCEDURE — 93000 ELECTROCARDIOGRAM COMPLETE: CPT

## 2025-01-14 PROCEDURE — 99215 OFFICE O/P EST HI 40 MIN: CPT

## 2025-01-14 RX ORDER — TAMSULOSIN HYDROCHLORIDE 0.4 MG/1
0.4 CAPSULE ORAL
Refills: 0 | Status: ACTIVE | COMMUNITY

## 2025-01-14 RX ORDER — AMIODARONE HYDROCHLORIDE 100 MG/1
100 TABLET ORAL
Refills: 0 | Status: ACTIVE | COMMUNITY

## 2025-01-14 RX ORDER — TRAMADOL HYDROCHLORIDE 50 MG/1
50 TABLET, COATED ORAL
Refills: 0 | Status: ACTIVE | COMMUNITY

## 2025-01-14 RX ORDER — AMLODIPINE BESYLATE 5 MG/1
5 TABLET ORAL
Refills: 0 | Status: ACTIVE | COMMUNITY

## 2025-01-14 RX ORDER — LISINOPRIL 20 MG/1
20 TABLET ORAL
Refills: 0 | Status: ACTIVE | COMMUNITY

## 2025-01-16 ENCOUNTER — APPOINTMENT (OUTPATIENT)
Dept: CARDIOLOGY | Facility: CLINIC | Age: 85
End: 2025-01-16

## 2025-01-18 DIAGNOSIS — R30.0 DYSURIA: ICD-10-CM

## 2025-01-18 RX ORDER — PHENAZOPYRIDINE HCL 95 MG
95 TABLET ORAL 3 TIMES DAILY
Qty: 24 | Refills: 0 | Status: ACTIVE | COMMUNITY
Start: 2025-01-18 | End: 1900-01-01

## 2025-01-18 RX ORDER — SULFAMETHOXAZOLE AND TRIMETHOPRIM 800; 160 MG/1; MG/1
800-160 TABLET ORAL TWICE DAILY
Qty: 2 | Refills: 1 | Status: ACTIVE | COMMUNITY
Start: 2025-01-18 | End: 1900-01-01

## 2025-01-29 ENCOUNTER — NON-APPOINTMENT (OUTPATIENT)
Age: 85
End: 2025-01-29

## 2025-01-30 ENCOUNTER — APPOINTMENT (OUTPATIENT)
Dept: CARDIOLOGY | Facility: CLINIC | Age: 85
End: 2025-01-30

## 2025-01-30 ENCOUNTER — APPOINTMENT (OUTPATIENT)
Dept: CARDIOLOGY | Facility: CLINIC | Age: 85
End: 2025-01-30
Payer: MEDICARE

## 2025-01-30 VITALS
OXYGEN SATURATION: 98 % | DIASTOLIC BLOOD PRESSURE: 60 MMHG | BODY MASS INDEX: 21.22 KG/M2 | WEIGHT: 140 LBS | SYSTOLIC BLOOD PRESSURE: 100 MMHG | HEIGHT: 68 IN | HEART RATE: 69 BPM

## 2025-01-30 DIAGNOSIS — E87.5 HYPERKALEMIA: ICD-10-CM

## 2025-01-30 DIAGNOSIS — I35.0 NONRHEUMATIC AORTIC (VALVE) STENOSIS: ICD-10-CM

## 2025-01-30 DIAGNOSIS — I48.21 PERMANENT ATRIAL FIBRILLATION: ICD-10-CM

## 2025-01-30 DIAGNOSIS — I25.10 ATHEROSCLEROTIC HEART DISEASE OF NATIVE CORONARY ARTERY W/OUT ANGINA PECTORIS: ICD-10-CM

## 2025-01-30 DIAGNOSIS — I42.9 CARDIOMYOPATHY, UNSPECIFIED: ICD-10-CM

## 2025-01-30 PROCEDURE — 99214 OFFICE O/P EST MOD 30 MIN: CPT

## 2025-01-30 PROCEDURE — G2211 COMPLEX E/M VISIT ADD ON: CPT

## 2025-02-03 ENCOUNTER — RESULT REVIEW (OUTPATIENT)
Age: 85
End: 2025-02-03

## 2025-02-03 ENCOUNTER — APPOINTMENT (OUTPATIENT)
Dept: ORTHOPEDIC SURGERY | Facility: CLINIC | Age: 85
End: 2025-02-03
Payer: MEDICARE

## 2025-02-03 ENCOUNTER — NON-APPOINTMENT (OUTPATIENT)
Age: 85
End: 2025-02-03

## 2025-02-03 ENCOUNTER — OUTPATIENT (OUTPATIENT)
Dept: OUTPATIENT SERVICES | Facility: HOSPITAL | Age: 85
LOS: 1 days | End: 2025-02-03
Payer: MEDICARE

## 2025-02-03 VITALS
HEIGHT: 68 IN | WEIGHT: 140 LBS | HEART RATE: 66 BPM | TEMPERATURE: 97.6 F | BODY MASS INDEX: 21.22 KG/M2 | DIASTOLIC BLOOD PRESSURE: 75 MMHG | OXYGEN SATURATION: 98 % | SYSTOLIC BLOOD PRESSURE: 120 MMHG

## 2025-02-03 DIAGNOSIS — Z98.89 OTHER SPECIFIED POSTPROCEDURAL STATES: Chronic | ICD-10-CM

## 2025-02-03 DIAGNOSIS — Z90.89 ACQUIRED ABSENCE OF OTHER ORGANS: Chronic | ICD-10-CM

## 2025-02-03 DIAGNOSIS — Z86.79 PERSONAL HISTORY OF OTHER DISEASES OF THE CIRCULATORY SYSTEM: Chronic | ICD-10-CM

## 2025-02-03 PROCEDURE — 73502 X-RAY EXAM HIP UNI 2-3 VIEWS: CPT

## 2025-02-03 PROCEDURE — 99024 POSTOP FOLLOW-UP VISIT: CPT

## 2025-02-03 PROCEDURE — 73502 X-RAY EXAM HIP UNI 2-3 VIEWS: CPT | Mod: 26,LT

## 2025-02-07 ENCOUNTER — LABORATORY RESULT (OUTPATIENT)
Age: 85
End: 2025-02-07

## 2025-02-10 ENCOUNTER — NON-APPOINTMENT (OUTPATIENT)
Age: 85
End: 2025-02-10

## 2025-02-10 RX ORDER — FUROSEMIDE 20 MG/1
20 TABLET ORAL
Qty: 90 | Refills: 1 | Status: ACTIVE | COMMUNITY
Start: 2025-02-10 | End: 1900-01-01

## 2025-02-20 ENCOUNTER — APPOINTMENT (OUTPATIENT)
Dept: CARDIOLOGY | Facility: CLINIC | Age: 85
End: 2025-02-20
Payer: MEDICARE

## 2025-02-20 VITALS
HEART RATE: 67 BPM | OXYGEN SATURATION: 97 % | DIASTOLIC BLOOD PRESSURE: 55 MMHG | BODY MASS INDEX: 21.29 KG/M2 | WEIGHT: 140 LBS | SYSTOLIC BLOOD PRESSURE: 88 MMHG

## 2025-02-20 DIAGNOSIS — I10 ESSENTIAL (PRIMARY) HYPERTENSION: ICD-10-CM

## 2025-02-20 DIAGNOSIS — I48.21 PERMANENT ATRIAL FIBRILLATION: ICD-10-CM

## 2025-02-20 DIAGNOSIS — I42.9 CARDIOMYOPATHY, UNSPECIFIED: ICD-10-CM

## 2025-02-20 DIAGNOSIS — E87.5 HYPERKALEMIA: ICD-10-CM

## 2025-02-20 DIAGNOSIS — I25.10 ATHEROSCLEROTIC HEART DISEASE OF NATIVE CORONARY ARTERY W/OUT ANGINA PECTORIS: ICD-10-CM

## 2025-02-20 DIAGNOSIS — I35.0 NONRHEUMATIC AORTIC (VALVE) STENOSIS: ICD-10-CM

## 2025-02-20 PROCEDURE — 93000 ELECTROCARDIOGRAM COMPLETE: CPT

## 2025-02-20 PROCEDURE — G2211 COMPLEX E/M VISIT ADD ON: CPT

## 2025-02-20 PROCEDURE — 99214 OFFICE O/P EST MOD 30 MIN: CPT

## 2025-02-26 LAB
ANION GAP SERPL CALC-SCNC: 13 MMOL/L
BUN SERPL-MCNC: 37 MG/DL
CALCIUM SERPL-MCNC: 9.9 MG/DL
CHLORIDE SERPL-SCNC: 98 MMOL/L
CO2 SERPL-SCNC: 25 MMOL/L
CREAT SERPL-MCNC: 1.39 MG/DL
EGFR: 49 ML/MIN/1.73M2
GLUCOSE SERPL-MCNC: 106 MG/DL
MAGNESIUM SERPL-MCNC: 2.4 MG/DL
NT-PROBNP SERPL-MCNC: 2094 PG/ML
POTASSIUM SERPL-SCNC: 5.7 MMOL/L
SODIUM SERPL-SCNC: 137 MMOL/L

## 2025-03-11 ENCOUNTER — APPOINTMENT (OUTPATIENT)
Dept: CARDIOLOGY | Facility: CLINIC | Age: 85
End: 2025-03-11

## 2025-03-18 ENCOUNTER — APPOINTMENT (OUTPATIENT)
Dept: CARDIOLOGY | Facility: CLINIC | Age: 85
End: 2025-03-18
Payer: MEDICARE

## 2025-03-18 VITALS
WEIGHT: 140 LBS | OXYGEN SATURATION: 97 % | DIASTOLIC BLOOD PRESSURE: 68 MMHG | HEART RATE: 69 BPM | HEIGHT: 68 IN | BODY MASS INDEX: 21.22 KG/M2 | SYSTOLIC BLOOD PRESSURE: 100 MMHG

## 2025-03-18 DIAGNOSIS — E87.5 HYPERKALEMIA: ICD-10-CM

## 2025-03-18 DIAGNOSIS — I25.10 ATHEROSCLEROTIC HEART DISEASE OF NATIVE CORONARY ARTERY W/OUT ANGINA PECTORIS: ICD-10-CM

## 2025-03-18 DIAGNOSIS — I35.0 NONRHEUMATIC AORTIC (VALVE) STENOSIS: ICD-10-CM

## 2025-03-18 DIAGNOSIS — I10 ESSENTIAL (PRIMARY) HYPERTENSION: ICD-10-CM

## 2025-03-18 DIAGNOSIS — I42.9 CARDIOMYOPATHY, UNSPECIFIED: ICD-10-CM

## 2025-03-18 DIAGNOSIS — I48.21 PERMANENT ATRIAL FIBRILLATION: ICD-10-CM

## 2025-03-18 PROCEDURE — G2211 COMPLEX E/M VISIT ADD ON: CPT

## 2025-03-18 PROCEDURE — 99214 OFFICE O/P EST MOD 30 MIN: CPT

## 2025-03-24 ENCOUNTER — APPOINTMENT (OUTPATIENT)
Dept: ORTHOPEDIC SURGERY | Facility: CLINIC | Age: 85
End: 2025-03-24
Payer: MEDICARE

## 2025-03-24 ENCOUNTER — OUTPATIENT (OUTPATIENT)
Dept: OUTPATIENT SERVICES | Facility: HOSPITAL | Age: 85
LOS: 1 days | End: 2025-03-24
Payer: MEDICARE

## 2025-03-24 ENCOUNTER — RESULT REVIEW (OUTPATIENT)
Age: 85
End: 2025-03-24

## 2025-03-24 VITALS
DIASTOLIC BLOOD PRESSURE: 72 MMHG | HEIGHT: 68 IN | SYSTOLIC BLOOD PRESSURE: 106 MMHG | WEIGHT: 140 LBS | OXYGEN SATURATION: 97 % | HEART RATE: 71 BPM | BODY MASS INDEX: 21.22 KG/M2

## 2025-03-24 DIAGNOSIS — M16.0 BILATERAL PRIMARY OSTEOARTHRITIS OF HIP: ICD-10-CM

## 2025-03-24 DIAGNOSIS — Z86.79 PERSONAL HISTORY OF OTHER DISEASES OF THE CIRCULATORY SYSTEM: Chronic | ICD-10-CM

## 2025-03-24 DIAGNOSIS — Z96.642 PRESENCE OF LEFT ARTIFICIAL HIP JOINT: ICD-10-CM

## 2025-03-24 DIAGNOSIS — Z98.89 OTHER SPECIFIED POSTPROCEDURAL STATES: Chronic | ICD-10-CM

## 2025-03-24 DIAGNOSIS — Z90.89 ACQUIRED ABSENCE OF OTHER ORGANS: Chronic | ICD-10-CM

## 2025-03-24 PROCEDURE — 99213 OFFICE O/P EST LOW 20 MIN: CPT

## 2025-03-24 PROCEDURE — 73521 X-RAY EXAM HIPS BI 2 VIEWS: CPT | Mod: 26

## 2025-03-24 PROCEDURE — 73521 X-RAY EXAM HIPS BI 2 VIEWS: CPT

## 2025-03-25 LAB
ANION GAP SERPL CALC-SCNC: 11 MMOL/L
BUN SERPL-MCNC: 19 MG/DL
CALCIUM SERPL-MCNC: 8.8 MG/DL
CHLORIDE SERPL-SCNC: 106 MMOL/L
CO2 SERPL-SCNC: 24 MMOL/L
CREAT SERPL-MCNC: 0.74 MG/DL
EGFRCR SERPLBLD CKD-EPI 2021: 88 ML/MIN/1.73M2
GLUCOSE SERPL-MCNC: 145 MG/DL
MAGNESIUM SERPL-MCNC: 2.1 MG/DL
NT-PROBNP SERPL-MCNC: 2276 PG/ML
POTASSIUM SERPL-SCNC: 5.2 MMOL/L
SODIUM SERPL-SCNC: 140 MMOL/L

## 2025-04-18 ENCOUNTER — NON-APPOINTMENT (OUTPATIENT)
Age: 85
End: 2025-04-18

## 2025-05-28 ENCOUNTER — APPOINTMENT (OUTPATIENT)
Dept: HEART AND VASCULAR | Facility: CLINIC | Age: 85
End: 2025-05-28

## 2025-05-28 PROCEDURE — 93306 TTE W/DOPPLER COMPLETE: CPT

## 2025-06-10 ENCOUNTER — APPOINTMENT (OUTPATIENT)
Dept: CARDIOLOGY | Facility: CLINIC | Age: 85
End: 2025-06-10

## 2025-06-12 ENCOUNTER — APPOINTMENT (OUTPATIENT)
Dept: CARDIOLOGY | Facility: CLINIC | Age: 85
End: 2025-06-12
Payer: MEDICARE

## 2025-06-12 VITALS — OXYGEN SATURATION: 97 % | DIASTOLIC BLOOD PRESSURE: 61 MMHG | SYSTOLIC BLOOD PRESSURE: 96 MMHG | HEART RATE: 63 BPM

## 2025-06-12 PROBLEM — I35.0 MODERATE AORTIC STENOSIS: Status: RESOLVED | Noted: 2024-07-16 | Resolved: 2025-06-12

## 2025-06-12 PROBLEM — Z86.73 HISTORY OF CEREBROVASCULAR ACCIDENT: Status: RESOLVED | Noted: 2024-07-12 | Resolved: 2025-06-12

## 2025-06-12 PROCEDURE — G2211 COMPLEX E/M VISIT ADD ON: CPT

## 2025-06-12 PROCEDURE — 99215 OFFICE O/P EST HI 40 MIN: CPT

## 2025-06-19 LAB
ALBUMIN SERPL ELPH-MCNC: 3.6 G/DL
ALP BLD-CCNC: 119 U/L
ALT SERPL-CCNC: 11 U/L
ANION GAP SERPL CALC-SCNC: 15 MMOL/L
AST SERPL-CCNC: 25 U/L
BILIRUB SERPL-MCNC: 0.2 MG/DL
BUN SERPL-MCNC: 23 MG/DL
CALCIUM SERPL-MCNC: 9.3 MG/DL
CHLORIDE SERPL-SCNC: 105 MMOL/L
CO2 SERPL-SCNC: 23 MMOL/L
CREAT SERPL-MCNC: 0.87 MG/DL
EGFRCR SERPLBLD CKD-EPI 2021: 84 ML/MIN/1.73M2
GLUCOSE SERPL-MCNC: 126 MG/DL
MAGNESIUM SERPL-MCNC: 2.1 MG/DL
NT-PROBNP SERPL-MCNC: 2479 PG/ML
POTASSIUM SERPL-SCNC: 5.3 MMOL/L
PROT SERPL-MCNC: 6.2 G/DL
SODIUM SERPL-SCNC: 143 MMOL/L

## 2025-06-23 ENCOUNTER — APPOINTMENT (OUTPATIENT)
Dept: ORTHOPEDIC SURGERY | Facility: CLINIC | Age: 85
End: 2025-06-23
Payer: MEDICARE

## 2025-06-23 ENCOUNTER — RESULT REVIEW (OUTPATIENT)
Age: 85
End: 2025-06-23

## 2025-06-23 ENCOUNTER — OUTPATIENT (OUTPATIENT)
Dept: OUTPATIENT SERVICES | Facility: HOSPITAL | Age: 85
LOS: 1 days | End: 2025-06-23
Payer: MEDICARE

## 2025-06-23 DIAGNOSIS — Z90.89 ACQUIRED ABSENCE OF OTHER ORGANS: Chronic | ICD-10-CM

## 2025-06-23 DIAGNOSIS — Z86.79 PERSONAL HISTORY OF OTHER DISEASES OF THE CIRCULATORY SYSTEM: Chronic | ICD-10-CM

## 2025-06-23 PROCEDURE — 73502 X-RAY EXAM HIP UNI 2-3 VIEWS: CPT

## 2025-06-23 PROCEDURE — 99213 OFFICE O/P EST LOW 20 MIN: CPT

## 2025-06-23 PROCEDURE — 73502 X-RAY EXAM HIP UNI 2-3 VIEWS: CPT | Mod: 26,LT

## 2025-07-24 ENCOUNTER — APPOINTMENT (OUTPATIENT)
Dept: CARDIOLOGY | Facility: CLINIC | Age: 85
End: 2025-07-24

## 2025-07-24 ENCOUNTER — APPOINTMENT (OUTPATIENT)
Dept: HEART AND VASCULAR | Facility: CLINIC | Age: 85
End: 2025-07-24

## 2025-07-24 VITALS
HEIGHT: 68 IN | SYSTOLIC BLOOD PRESSURE: 126 MMHG | HEART RATE: 57 BPM | OXYGEN SATURATION: 97 % | DIASTOLIC BLOOD PRESSURE: 78 MMHG | WEIGHT: 140 LBS | BODY MASS INDEX: 21.22 KG/M2

## 2025-07-24 DIAGNOSIS — I35.0 NONRHEUMATIC AORTIC (VALVE) STENOSIS: ICD-10-CM

## 2025-08-07 ENCOUNTER — APPOINTMENT (OUTPATIENT)
Dept: CARDIOLOGY | Facility: CLINIC | Age: 85
End: 2025-08-07
Payer: MEDICARE

## 2025-08-07 DIAGNOSIS — I25.10 ATHEROSCLEROTIC HEART DISEASE OF NATIVE CORONARY ARTERY W/OUT ANGINA PECTORIS: ICD-10-CM

## 2025-08-07 DIAGNOSIS — I35.0 NONRHEUMATIC AORTIC (VALVE) STENOSIS: ICD-10-CM

## 2025-08-07 DIAGNOSIS — I10 ESSENTIAL (PRIMARY) HYPERTENSION: ICD-10-CM

## 2025-08-07 DIAGNOSIS — I42.9 CARDIOMYOPATHY, UNSPECIFIED: ICD-10-CM

## 2025-08-07 DIAGNOSIS — I73.9 PERIPHERAL VASCULAR DISEASE, UNSPECIFIED: ICD-10-CM

## 2025-08-07 DIAGNOSIS — I48.21 PERMANENT ATRIAL FIBRILLATION: ICD-10-CM

## 2025-08-07 DIAGNOSIS — E78.5 HYPERLIPIDEMIA, UNSPECIFIED: ICD-10-CM

## 2025-08-07 PROCEDURE — 99214 OFFICE O/P EST MOD 30 MIN: CPT | Mod: 2W

## 2025-08-07 PROCEDURE — G2211 COMPLEX E/M VISIT ADD ON: CPT | Mod: 2W

## 2025-08-08 PROBLEM — I35.0 MODERATE TO SEVERE AORTIC STENOSIS: Status: ACTIVE | Noted: 2025-06-12

## (undated) DEVICE — PREP DURAPREP 26CC

## (undated) DEVICE — GLV 6.5 PROTEXIS (WHITE)

## (undated) DEVICE — SUT VICRYL 0 36" CT-1 UNDYED

## (undated) DEVICE — DRAPE TOWEL BLUE 17" X 24"

## (undated) DEVICE — DRAPE LIGHT HANDLE COVER (BLUE)

## (undated) DEVICE — NDL HYPO SAFE 20G X 1.5" (YELLOW)

## (undated) DEVICE — BLADE SURGICAL #15 CARBON

## (undated) DEVICE — DRILL BIT STRYKER ORTHO LOKG 4.2X360MM

## (undated) DEVICE — SAW BLADE STRYKER SAGITTAL DUAL CUT 75X18X1.27MM

## (undated) DEVICE — DRAPE IOBAN 33" X 23"

## (undated) DEVICE — DRSG DERMABOND PRINEO 22CM

## (undated) DEVICE — ELCTR GROUNDING PAD ADULT COVIDIEN

## (undated) DEVICE — NEPTUNE 4-PORT MANIFOLD STANDARD

## (undated) DEVICE — SOL IRR BAG NS 0.9% 3000ML

## (undated) DEVICE — DRSG COBAN 6"

## (undated) DEVICE — SUT VICRYL PLUS 2-0 27" SH UNDYED

## (undated) DEVICE — DRILL BIT S&N ACETABULAR 25MM

## (undated) DEVICE — SUT MONOCRYL 3-0 27" PS-1 UNDYED

## (undated) DEVICE — POSITIONER CLIP ON PAD FOR UNIVERSAL LATERAL

## (undated) DEVICE — SUCTION YANKAUER OPEN TIP NO VENT CURVE

## (undated) DEVICE — SUT PDS II PLUS 1 27" CT-1

## (undated) DEVICE — GLV 7.5 PROTEXIS ORTHO (CREAM)

## (undated) DEVICE — SUT VICRYL 2-0 27" CT-1 UNDYED

## (undated) DEVICE — SUT ETHIBOND 5 4-30" V-37

## (undated) DEVICE — PACK TOTAL HIP

## (undated) DEVICE — HOOD T7 PLUS PEELAWAY

## (undated) DEVICE — DRAPE HIP W POUCHES 87X115X134"

## (undated) DEVICE — DRAPE LIGHT HANDLE COVER (GREEN)

## (undated) DEVICE — SUT STRATAFIX SYMMETRIC PDS PLUS 1 60CM CTX VIOLET

## (undated) DEVICE — ELCTR STRYKER NEPTUNE SMOKE EVACUATION PENCIL (GREEN)

## (undated) DEVICE — DRAPE U (BLUE) 60 X 72"

## (undated) DEVICE — DRAPE ISOLATION W INCISE FILM & POUCH

## (undated) DEVICE — GLV 7.5 PROTEXIS (WHITE)

## (undated) DEVICE — SUT PDS II PLUS 1 27" CP-1

## (undated) DEVICE — SUT STRATAFIX SYMMETRIC PDS 1 45CM OS-6

## (undated) DEVICE — SUT RETRIEVER LASSO HOOK

## (undated) DEVICE — SUT VICRYL 1 27" CT-1 UNDYED

## (undated) DEVICE — WOUND IRR SURGIPHOR